# Patient Record
Sex: MALE | Race: BLACK OR AFRICAN AMERICAN | NOT HISPANIC OR LATINO | Employment: FULL TIME | URBAN - METROPOLITAN AREA
[De-identification: names, ages, dates, MRNs, and addresses within clinical notes are randomized per-mention and may not be internally consistent; named-entity substitution may affect disease eponyms.]

---

## 2018-05-14 ENCOUNTER — TELEPHONE (OUTPATIENT)
Dept: OTOLARYNGOLOGY | Facility: CLINIC | Age: 63
End: 2018-05-14

## 2018-05-14 NOTE — TELEPHONE ENCOUNTER
Spoke with patient concerning nodule , lump in throat DR. FOURNIER does not treat throat area , refer patient to HEAD AND NECK  DR. LEMUS MAY 17, 2018 Thursday

## 2018-05-17 ENCOUNTER — LAB VISIT (OUTPATIENT)
Dept: LAB | Facility: HOSPITAL | Age: 63
End: 2018-05-17
Attending: OTOLARYNGOLOGY
Payer: COMMERCIAL

## 2018-05-17 ENCOUNTER — OFFICE VISIT (OUTPATIENT)
Dept: OTOLARYNGOLOGY | Facility: CLINIC | Age: 63
End: 2018-05-17
Payer: COMMERCIAL

## 2018-05-17 VITALS
HEART RATE: 86 BPM | BODY MASS INDEX: 31.48 KG/M2 | WEIGHT: 245.13 LBS | TEMPERATURE: 99 F | DIASTOLIC BLOOD PRESSURE: 84 MMHG | SYSTOLIC BLOOD PRESSURE: 133 MMHG

## 2018-05-17 DIAGNOSIS — K11.8 PAROTID MASS: ICD-10-CM

## 2018-05-17 DIAGNOSIS — K11.8 PAROTID MASS: Primary | ICD-10-CM

## 2018-05-17 LAB
CREAT SERPL-MCNC: 0.8 MG/DL
EST. GFR  (AFRICAN AMERICAN): >60 ML/MIN/1.73 M^2
EST. GFR  (NON AFRICAN AMERICAN): >60 ML/MIN/1.73 M^2

## 2018-05-17 PROCEDURE — 3008F BODY MASS INDEX DOCD: CPT | Mod: CPTII,S$GLB,, | Performed by: OTOLARYNGOLOGY

## 2018-05-17 PROCEDURE — 82565 ASSAY OF CREATININE: CPT

## 2018-05-17 PROCEDURE — 88173 CYTOPATH EVAL FNA REPORT: CPT | Performed by: PATHOLOGY

## 2018-05-17 PROCEDURE — 99999 PR PBB SHADOW E&M-EST. PATIENT-LVL III: CPT | Mod: PBBFAC,,, | Performed by: OTOLARYNGOLOGY

## 2018-05-17 PROCEDURE — 88173 CYTOPATH EVAL FNA REPORT: CPT | Mod: 26,,, | Performed by: PATHOLOGY

## 2018-05-17 PROCEDURE — 36415 COLL VENOUS BLD VENIPUNCTURE: CPT

## 2018-05-17 PROCEDURE — 99213 OFFICE O/P EST LOW 20 MIN: CPT | Mod: 25,S$GLB,, | Performed by: OTOLARYNGOLOGY

## 2018-05-17 PROCEDURE — 31575 DIAGNOSTIC LARYNGOSCOPY: CPT | Mod: S$GLB,,, | Performed by: OTOLARYNGOLOGY

## 2018-05-17 PROCEDURE — 10021 FNA BX W/O IMG GDN 1ST LES: CPT | Mod: ,,, | Performed by: PATHOLOGY

## 2018-05-17 NOTE — PROCEDURES
The patient was examined and there was a palpable mass, lt parotid    The patient was explained the nature of the procedure and risks, and a consent form was signed. At timeout was performed at 3:20 pm    The skin was prepared with alcohol, and fine needle aspirate was performed. 2 passes were performed. Material was obtained, and results will follow in a separate report. The patient tolerated the procedure well.  Additional comments: none   Complications: none

## 2018-05-18 ENCOUNTER — TELEPHONE (OUTPATIENT)
Dept: OTOLARYNGOLOGY | Facility: CLINIC | Age: 63
End: 2018-05-18

## 2018-05-18 ENCOUNTER — HOSPITAL ENCOUNTER (OUTPATIENT)
Dept: RADIOLOGY | Facility: HOSPITAL | Age: 63
Discharge: HOME | End: 2018-05-18
Attending: OTOLARYNGOLOGY
Payer: COMMERCIAL

## 2018-05-18 DIAGNOSIS — K11.8 PAROTID MASS: ICD-10-CM

## 2018-05-18 PROCEDURE — 70491 CT SOFT TISSUE NECK W/DYE: CPT | Mod: TC

## 2018-05-18 PROCEDURE — 25500020 PHARM REV CODE 255: Performed by: OTOLARYNGOLOGY

## 2018-05-18 PROCEDURE — 70491 CT SOFT TISSUE NECK W/DYE: CPT | Mod: 26,,, | Performed by: RADIOLOGY

## 2018-05-18 RX ADMIN — IOHEXOL 75 ML: 350 INJECTION, SOLUTION INTRAVENOUS at 09:05

## 2018-05-22 ENCOUNTER — OFFICE VISIT (OUTPATIENT)
Dept: OTOLARYNGOLOGY | Facility: CLINIC | Age: 63
End: 2018-05-22
Payer: COMMERCIAL

## 2018-05-22 VITALS
DIASTOLIC BLOOD PRESSURE: 93 MMHG | WEIGHT: 247.81 LBS | BODY MASS INDEX: 31.82 KG/M2 | HEART RATE: 96 BPM | TEMPERATURE: 98 F | SYSTOLIC BLOOD PRESSURE: 133 MMHG

## 2018-05-22 DIAGNOSIS — K11.8 PAROTID MASS: Primary | ICD-10-CM

## 2018-05-22 PROCEDURE — 99999 PR PBB SHADOW E&M-EST. PATIENT-LVL III: CPT | Mod: PBBFAC,,, | Performed by: OTOLARYNGOLOGY

## 2018-05-22 PROCEDURE — 3008F BODY MASS INDEX DOCD: CPT | Mod: CPTII,S$GLB,, | Performed by: OTOLARYNGOLOGY

## 2018-05-22 PROCEDURE — 10021 FNA BX W/O IMG GDN 1ST LES: CPT | Mod: ,,, | Performed by: PATHOLOGY

## 2018-05-22 PROCEDURE — 88173 CYTOPATH EVAL FNA REPORT: CPT | Performed by: PATHOLOGY

## 2018-05-22 PROCEDURE — 99213 OFFICE O/P EST LOW 20 MIN: CPT | Mod: S$GLB,,, | Performed by: OTOLARYNGOLOGY

## 2018-05-22 PROCEDURE — 88173 CYTOPATH EVAL FNA REPORT: CPT | Mod: 26,,, | Performed by: PATHOLOGY

## 2018-05-22 NOTE — PROCEDURES
The patient was examined and there was a palpable mass,lt parotid      The patient was explained the nature of the procedure and risks, and a consent form was signed. At timeout was performed at 4 pm    The skin was prepared with alcohol, and fine needle aspirate was performed. 3 passes were performed. Material was obtained, and results will follow in a separate report. The patient tolerated the procedure well.  Additional comments: none  Complications: none

## 2018-05-22 NOTE — PROGRESS NOTES
Chief Complaint   Patient presents with    lump in throat       HPI   62 y.o. male presents for evaluation of a L parotid mass.  He reports that this mass arose several mos ago and has grown progressively larger over this period of time.  No pain.  No sore throat, dysphagia, or SOB.  He denies facial paresthesia.     Review of Systems   Constitutional: Negative for fatigue and unexpected weight change.   HENT: Per HPI.  Eyes: Negative for visual disturbance.   Respiratory: Negative for shortness of breath, hemoptysis   Cardiovascular: Negative for chest pain and palpitations.   Musculoskeletal: Negative for decreased ROM, back pain.   Skin: Negative for rash, sunburn, itching.   Neurological: Negative for dizziness and seizures.   Hematological: Negative for adenopathy. Does not bruise/bleed easily.   Endocrine: Negative for rapid weight loss/weight gain, heat/cold intolerance.     Past Medical History   Patient Active Problem List   Diagnosis    Sensation of fullness in both ears    Impacted cerumen of both ears           Past Surgical History   History reviewed. No pertinent surgical history.      Family History   History reviewed. No pertinent family history.        Social History   .  Social History     Social History    Marital status:      Spouse name: N/A    Number of children: N/A    Years of education: N/A     Occupational History    Not on file.     Social History Main Topics    Smoking status: Never Smoker    Smokeless tobacco: Never Used    Alcohol use No    Drug use: Unknown    Sexual activity: Not on file     Other Topics Concern    Not on file     Social History Narrative    No narrative on file         Allergies   Review of patient's allergies indicates:  No Known Allergies        Physical Exam     Vitals:    05/17/18 1442   BP: 133/84   Pulse: 86   Temp: 98.7 °F (37.1 °C)         Body mass index is 31.48 kg/m².      General: AOx3, NAD   Respiratory:  Symmetric chest rise, normal  effort  Nose: No gross nasal septal deviation. Inferior Turbinates WNL bilaterally. No septal perforation. No masses/lesions.   Oral Cavity:  Oral Tongue mobile, no lesions noted. Hard Palate WNL. No buccal or FOM lesions.  Oropharynx:  No masses/lesions of the posterior pharyngeal wall. Tonsillar fossa without lesions. Soft palate without masses. Midline uvula.   Neck: No scars.  No cervical lymphadenopathy, thyromegaly or thyroid nodules.  Normal range of motion.    Face: House Brackmann I bilaterally. L parotid with firm mass based at the inferior aspect of the parotid gland.  ~6 cm in diameter.  The mass extends to the submandibular region.     Flex Naso Ericka Hypo Procedures #2    Procedure:  Diagnostic flexible nasopharyngoscopy, laryngoscopy and hypopharyngoscopy:    Routine preparation with local atomizer with 1% neosynephrine/pontocaine with customary flexible endoscope.    Nasopharynx:  No lesions.   Mucosa:  No lesions.   Adenoids:  Present.  Posterior Choanae:  Patent.  Eustachian Tubes:  Patent.  Posterior pharynx:  No lesions.  Larynx/hypopharynx:   Epiglottis:  No lesions, without edema.   AE Folds:  No lesions.   Vocal cords:  No polyps, nodules, ulcers or lesions.   Mobility:  Equal and normal bilateral.   Hypopharynx:  No lesions.   Piriform sinus:  No pooling, no lesions.   Post Cricoid:  No erythema, no edema.      Assessment/Plan  Problem List Items Addressed This Visit        ENT    Parotid mass - Primary     L parotid mass worrisome for malignancy.  No other lesions noted on head and neck exam.  FNA performed today.   I ordered a CT scan of the neck and will contact him with the results of these studies.          Relevant Orders    Cytology Specimen-FNA-Pathologist Performed (Completed)    CT Soft Tissue Neck With Contrast (Completed)    Creatinine, serum (Completed)

## 2018-05-22 NOTE — ASSESSMENT & PLAN NOTE
L parotid mass worrisome for malignancy.  No other lesions noted on head and neck exam.  FNA performed today.   I ordered a CT scan of the neck and will contact him with the results of these studies.

## 2018-05-23 DIAGNOSIS — R22.1 NECK MASS: Primary | ICD-10-CM

## 2018-05-23 RX ORDER — LIDOCAINE HYDROCHLORIDE 10 MG/ML
1 INJECTION, SOLUTION EPIDURAL; INFILTRATION; INTRACAUDAL; PERINEURAL ONCE
Status: CANCELLED | OUTPATIENT
Start: 2018-05-23 | End: 2018-05-23

## 2018-05-28 NOTE — ASSESSMENT & PLAN NOTE
Clinically and radiographically worrisome for malignancy.  Repeat FNA today.  Will contact him with FNA results.

## 2018-05-28 NOTE — PROGRESS NOTES
Chief Complaint   Patient presents with    Follow-up     Discuss FNA       HPI   62 y.o. male presents for evaluation of a L parotid mass.  He reports that this mass arose several mos ago and has grown progressively larger over this period of time.  No pain.  No sore throat, dysphagia, or SOB.  He denies facial paresthesia.     CT neck worrisome for malignancy. Recent FNA essentially nondiagnostic.  He presents for repeat FNA.     Review of Systems   Constitutional: Negative for fatigue and unexpected weight change.   HENT: Per HPI.  Eyes: Negative for visual disturbance.   Respiratory: Negative for shortness of breath, hemoptysis   Cardiovascular: Negative for chest pain and palpitations.   Musculoskeletal: Negative for decreased ROM, back pain.   Skin: Negative for rash, sunburn, itching.   Neurological: Negative for dizziness and seizures.   Hematological: Negative for adenopathy. Does not bruise/bleed easily.   Endocrine: Negative for rapid weight loss/weight gain, heat/cold intolerance.     Past Medical History   Patient Active Problem List   Diagnosis    Sensation of fullness in both ears    Impacted cerumen of both ears    Parotid mass           Past Surgical History   No past surgical history on file.      Family History   No family history on file.        Social History   .  Social History     Social History    Marital status:      Spouse name: N/A    Number of children: N/A    Years of education: N/A     Occupational History    Not on file.     Social History Main Topics    Smoking status: Never Smoker    Smokeless tobacco: Never Used    Alcohol use No    Drug use: Unknown    Sexual activity: Not on file     Other Topics Concern    Not on file     Social History Narrative    No narrative on file         Allergies   Review of patient's allergies indicates:  No Known Allergies        Physical Exam     Vitals:    05/22/18 1522   BP: (!) 133/93   Pulse: 96   Temp: 98 °F (36.7 °C)          Body mass index is 31.82 kg/m².      General: AOx3, NAD   Respiratory:  Symmetric chest rise, normal effort   Neck: No scars.  No cervical lymphadenopathy, thyromegaly or thyroid nodules.  Normal range of motion.    Face: House Brackmann I bilaterally. L parotid with firm mass based at the inferior aspect of the parotid gland.  ~6 cm in diameter.  The mass extends to the submandibular region.     CT neck reviewed      Assessment/Plan  Problem List Items Addressed This Visit        ENT    Parotid mass - Primary     Clinically and radiographically worrisome for malignancy.  Repeat FNA today.  Will contact him with FNA results.          Relevant Orders    Cytology Specimen-FNA-Pathologist Performed (Completed)

## 2018-05-29 ENCOUNTER — OFFICE VISIT (OUTPATIENT)
Dept: OTOLARYNGOLOGY | Facility: CLINIC | Age: 63
DRG: 129 | End: 2018-05-29
Payer: COMMERCIAL

## 2018-05-29 ENCOUNTER — HOSPITAL ENCOUNTER (OUTPATIENT)
Dept: RADIOLOGY | Facility: HOSPITAL | Age: 63
Discharge: HOME | End: 2018-05-29
Attending: OTOLARYNGOLOGY
Payer: COMMERCIAL

## 2018-05-29 VITALS — HEART RATE: 79 BPM | SYSTOLIC BLOOD PRESSURE: 137 MMHG | DIASTOLIC BLOOD PRESSURE: 88 MMHG | TEMPERATURE: 99 F

## 2018-05-29 DIAGNOSIS — R22.1 NECK MASS: Primary | ICD-10-CM

## 2018-05-29 DIAGNOSIS — R22.1 NECK MASS: ICD-10-CM

## 2018-05-29 PROCEDURE — 99999 PR PBB SHADOW E&M-EST. PATIENT-LVL II: CPT | Mod: PBBFAC,,, | Performed by: OTOLARYNGOLOGY

## 2018-05-29 PROCEDURE — 71260 CT THORAX DX C+: CPT | Mod: TC

## 2018-05-29 PROCEDURE — 25500020 PHARM REV CODE 255: Performed by: OTOLARYNGOLOGY

## 2018-05-29 PROCEDURE — 71260 CT THORAX DX C+: CPT | Mod: 26,,, | Performed by: RADIOLOGY

## 2018-05-29 PROCEDURE — 99214 OFFICE O/P EST MOD 30 MIN: CPT | Mod: S$GLB,,, | Performed by: OTOLARYNGOLOGY

## 2018-05-29 RX ORDER — METFORMIN HYDROCHLORIDE 500 MG/1
1000 TABLET ORAL 2 TIMES DAILY WITH MEALS
COMMUNITY
End: 2018-08-07 | Stop reason: SDUPTHER

## 2018-05-29 RX ADMIN — IOHEXOL 75 ML: 350 INJECTION, SOLUTION INTRAVENOUS at 09:05

## 2018-05-29 NOTE — PROGRESS NOTES
Chief Complaint   Patient presents with    Discuss CT results       HPI   62 y.o. male presents for evaluation of a L parotid mass.  He reports that this mass arose several mos ago and has grown progressively larger over this period of time.  No pain.  No sore throat, dysphagia, or SOB.  He denies facial paresthesia.     CT neck worrisome for malignancy. Recent FNA essentially nondiagnostic x2.  He does feel that his neck mass has increased in size since his last visit.  His lip weakness has also worsened. CT chest today clear.  He is scheduled for surgery tomorrow.     Review of Systems   Constitutional: Negative for fatigue and unexpected weight change.   HENT: Per HPI.  Eyes: Negative for visual disturbance.   Respiratory: Negative for shortness of breath, hemoptysis   Cardiovascular: Negative for chest pain and palpitations.   Musculoskeletal: Negative for decreased ROM, back pain.   Skin: Negative for rash, sunburn, itching.   Neurological: Negative for dizziness and seizures.   Hematological: Negative for adenopathy. Does not bruise/bleed easily.   Endocrine: Negative for rapid weight loss/weight gain, heat/cold intolerance.     Past Medical History   Patient Active Problem List   Diagnosis    Sensation of fullness in both ears    Impacted cerumen of both ears    Parotid mass           Past Surgical History   No past surgical history on file.      Family History   No family history on file.        Social History   .  Social History     Social History    Marital status:      Spouse name: N/A    Number of children: N/A    Years of education: N/A     Occupational History    Not on file.     Social History Main Topics    Smoking status: Never Smoker    Smokeless tobacco: Never Used    Alcohol use No    Drug use: Unknown    Sexual activity: Not on file     Other Topics Concern    Not on file     Social History Narrative    No narrative on file         Allergies   Review of patient's allergies  indicates:  No Known Allergies        Physical Exam     Vitals:    05/29/18 1311   BP: 137/88   Pulse: 79   Temp: 98.7 °F (37.1 °C)         There is no height or weight on file to calculate BMI.      General: AOx3, NAD   Respiratory:  Symmetric chest rise, normal effort   Neck: No scars.  No cervical lymphadenopathy, thyromegaly or thyroid nodules.  Normal range of motion.    Face: House Brackmann I bilaterally. L parotid with firm mass based at the inferior aspect of the parotid gland.  ~6 cm in diameter.  The mass extends to the submandibular region.     CT neck, chest reviewed      Assessment/Plan  Problem List Items Addressed This Visit        ENT    Neck mass - Primary     L neck mass worrisome for malignancy.  FNA essentially nondiagnostic x2.  I recommended that we proceed to the OR for biopsy of this lesion and, should it prove consistent with carcinoma on frozen section, surgical resection.  I explained that surgery would involve L parotidectomy with sacrifice of the marginal mandibular nerve and, possibly, the facial nerve depending on the extent of nerve invasion, L neck dissection and, possibly, mandibulectomy.    We then discussed the risks, benefits, indications, and alternatives to left parotidectomy.  The risks were noted to include, but not be limited to, infection, bleeding, scarring, partial or total weakness of one or all of the branches of the facial nerve, eye dryness and potential injury with nerve weakness, numbness of the ear and the side of the face, gustatory sweating (Christine's syndrome - which was described as sweating while eating due to cholinergic stimulation of sweat glands in the absence of salivary tissue), first bite syndrome (pain upon initial bite of food after dissection in the parapharyngeal space, tumor spillage, recurrence, collection of blood or tissue fluid, sialolcele formation requiring drainage, and the need for additional procedures.  The benefits in this case will be  diagnostic and potentially therapeutic, and the indication is a parotid mass.  The chief alternative, in the absence of a diagnosis, is observation or repeat needle biopsy.  Time was allowed for questions, and all questions were answered to the patient's apparent satisfaction.      We discussed the risks, benefits, and indications to left neck dissection. The risks were noted to include, but not be limited to, infection, bleeding, scarring, collection of blood or tissue fluid requiring drainage, weakness of the lip which may be temporary or permanent, weakness of the tongue which could be temporary or permanent and cause speech and/or swallowing difficulty, weakness of the shoulder which could be temporary or permanent, pain, chyle leakage which would require dietary modification and perhaps additional procedures, and the need for additional procedures. Time was allowed for questions, and all questions were answered to the patient's apparent satisfaction.     We will proceed with surgery tomorrow, 5/29/18.

## 2018-05-29 NOTE — ASSESSMENT & PLAN NOTE
L neck mass worrisome for malignancy.  FNA essentially nondiagnostic x2.  I recommended that we proceed to the OR for biopsy of this lesion and, should it prove consistent with carcinoma on frozen section, surgical resection.  I explained that surgery would involve L parotidectomy with sacrifice of the marginal mandibular nerve and, possibly, the facial nerve depending on the extent of nerve invasion, L neck dissection and, possibly, mandibulectomy.    We then discussed the risks, benefits, indications, and alternatives to left parotidectomy.  The risks were noted to include, but not be limited to, infection, bleeding, scarring, partial or total weakness of one or all of the branches of the facial nerve, eye dryness and potential injury with nerve weakness, numbness of the ear and the side of the face, gustatory sweating (Christine's syndrome - which was described as sweating while eating due to cholinergic stimulation of sweat glands in the absence of salivary tissue), first bite syndrome (pain upon initial bite of food after dissection in the parapharyngeal space, tumor spillage, recurrence, collection of blood or tissue fluid, sialolcele formation requiring drainage, and the need for additional procedures.  The benefits in this case will be diagnostic and potentially therapeutic, and the indication is a parotid mass.  The chief alternative, in the absence of a diagnosis, is observation or repeat needle biopsy.  Time was allowed for questions, and all questions were answered to the patient's apparent satisfaction.      We discussed the risks, benefits, and indications to left neck dissection. The risks were noted to include, but not be limited to, infection, bleeding, scarring, collection of blood or tissue fluid requiring drainage, weakness of the lip which may be temporary or permanent, weakness of the tongue which could be temporary or permanent and cause speech and/or swallowing difficulty, weakness of the shoulder  which could be temporary or permanent, pain, chyle leakage which would require dietary modification and perhaps additional procedures, and the need for additional procedures. Time was allowed for questions, and all questions were answered to the patient's apparent satisfaction.     We will proceed with surgery tomorrow, 5/29/18.

## 2018-05-30 ENCOUNTER — ANESTHESIA EVENT (OUTPATIENT)
Dept: SURGERY | Facility: HOSPITAL | Age: 63
DRG: 129 | End: 2018-05-30
Payer: COMMERCIAL

## 2018-05-30 ENCOUNTER — HOSPITAL ENCOUNTER (INPATIENT)
Facility: HOSPITAL | Age: 63
LOS: 2 days | Discharge: HOME | DRG: 129 | End: 2018-06-01
Attending: OTOLARYNGOLOGY | Admitting: OTOLARYNGOLOGY
Payer: COMMERCIAL

## 2018-05-30 ENCOUNTER — ANESTHESIA (OUTPATIENT)
Dept: SURGERY | Facility: HOSPITAL | Age: 63
DRG: 129 | End: 2018-05-30
Payer: COMMERCIAL

## 2018-05-30 DIAGNOSIS — R22.1 NECK MASS: Primary | ICD-10-CM

## 2018-05-30 LAB — POCT GLUCOSE: 194 MG/DL (ref 70–110)

## 2018-05-30 PROCEDURE — 88189 FLOWCYTOMETRY/READ 16 & >: CPT | Mod: ,,, | Performed by: PATHOLOGY

## 2018-05-30 PROCEDURE — 38724 REMOVAL OF LYMPH NODES NECK: CPT | Mod: LT,,, | Performed by: OTOLARYNGOLOGY

## 2018-05-30 PROCEDURE — P9045 ALBUMIN (HUMAN), 5%, 250 ML: HCPCS | Mod: JG | Performed by: STUDENT IN AN ORGANIZED HEALTH CARE EDUCATION/TRAINING PROGRAM

## 2018-05-30 PROCEDURE — 25000003 PHARM REV CODE 250: Performed by: STUDENT IN AN ORGANIZED HEALTH CARE EDUCATION/TRAINING PROGRAM

## 2018-05-30 PROCEDURE — 25000242 PHARM REV CODE 250 ALT 637 W/ HCPCS: Performed by: STUDENT IN AN ORGANIZED HEALTH CARE EDUCATION/TRAINING PROGRAM

## 2018-05-30 PROCEDURE — 27201423 OPTIME MED/SURG SUP & DEVICES STERILE SUPPLY: Performed by: OTOLARYNGOLOGY

## 2018-05-30 PROCEDURE — 12000002 HC ACUTE/MED SURGE SEMI-PRIVATE ROOM

## 2018-05-30 PROCEDURE — 88341 IMHCHEM/IMCYTCHM EA ADD ANTB: CPT | Mod: 26,59,, | Performed by: PATHOLOGY

## 2018-05-30 PROCEDURE — 07T20ZZ RESECTION OF LEFT NECK LYMPHATIC, OPEN APPROACH: ICD-10-PCS | Performed by: OTOLARYNGOLOGY

## 2018-05-30 PROCEDURE — 37000009 HC ANESTHESIA EA ADD 15 MINS: Performed by: OTOLARYNGOLOGY

## 2018-05-30 PROCEDURE — 88185 FLOWCYTOMETRY/TC ADD-ON: CPT | Mod: 59 | Performed by: PATHOLOGY

## 2018-05-30 PROCEDURE — 82962 GLUCOSE BLOOD TEST: CPT | Performed by: OTOLARYNGOLOGY

## 2018-05-30 PROCEDURE — D9220A PRA ANESTHESIA: Mod: ANES,,, | Performed by: ANESTHESIOLOGY

## 2018-05-30 PROCEDURE — 88307 TISSUE EXAM BY PATHOLOGIST: CPT | Mod: 26,,, | Performed by: PATHOLOGY

## 2018-05-30 PROCEDURE — 36000708 HC OR TIME LEV III 1ST 15 MIN: Performed by: OTOLARYNGOLOGY

## 2018-05-30 PROCEDURE — 88365 INSITU HYBRIDIZATION (FISH): CPT | Mod: 26,,, | Performed by: PATHOLOGY

## 2018-05-30 PROCEDURE — 37000008 HC ANESTHESIA 1ST 15 MINUTES: Performed by: OTOLARYNGOLOGY

## 2018-05-30 PROCEDURE — 0CT90ZZ RESECTION OF LEFT PAROTID GLAND, OPEN APPROACH: ICD-10-PCS | Performed by: OTOLARYNGOLOGY

## 2018-05-30 PROCEDURE — 07B20ZX EXCISION OF LEFT NECK LYMPHATIC, OPEN APPROACH, DIAGNOSTIC: ICD-10-PCS | Performed by: OTOLARYNGOLOGY

## 2018-05-30 PROCEDURE — 63600175 PHARM REV CODE 636 W HCPCS: Performed by: STUDENT IN AN ORGANIZED HEALTH CARE EDUCATION/TRAINING PROGRAM

## 2018-05-30 PROCEDURE — 25000003 PHARM REV CODE 250: Performed by: OTOLARYNGOLOGY

## 2018-05-30 PROCEDURE — 00BM0ZZ EXCISION OF FACIAL NERVE, OPEN APPROACH: ICD-10-PCS | Performed by: OTOLARYNGOLOGY

## 2018-05-30 PROCEDURE — 25000003 PHARM REV CODE 250: Performed by: NURSE ANESTHETIST, CERTIFIED REGISTERED

## 2018-05-30 PROCEDURE — 42425 EXCISE PAROTID GLAND/LESION: CPT | Mod: 51,LT,, | Performed by: OTOLARYNGOLOGY

## 2018-05-30 PROCEDURE — 88342 IMHCHEM/IMCYTCHM 1ST ANTB: CPT | Mod: 26,59,, | Performed by: PATHOLOGY

## 2018-05-30 PROCEDURE — 71000033 HC RECOVERY, INTIAL HOUR: Performed by: OTOLARYNGOLOGY

## 2018-05-30 PROCEDURE — 88305 TISSUE EXAM BY PATHOLOGIST: CPT | Performed by: PATHOLOGY

## 2018-05-30 PROCEDURE — 88307 TISSUE EXAM BY PATHOLOGIST: CPT | Performed by: PATHOLOGY

## 2018-05-30 PROCEDURE — 63600175 PHARM REV CODE 636 W HCPCS: Performed by: OTOLARYNGOLOGY

## 2018-05-30 PROCEDURE — D9220A PRA ANESTHESIA: Mod: CRNA,,, | Performed by: NURSE ANESTHETIST, CERTIFIED REGISTERED

## 2018-05-30 PROCEDURE — 63600175 PHARM REV CODE 636 W HCPCS: Performed by: NURSE ANESTHETIST, CERTIFIED REGISTERED

## 2018-05-30 PROCEDURE — 88184 FLOWCYTOMETRY/ TC 1 MARKER: CPT | Performed by: PATHOLOGY

## 2018-05-30 PROCEDURE — 88305 TISSUE EXAM BY PATHOLOGIST: CPT | Mod: 26,,, | Performed by: PATHOLOGY

## 2018-05-30 PROCEDURE — 71000039 HC RECOVERY, EACH ADD'L HOUR: Performed by: OTOLARYNGOLOGY

## 2018-05-30 PROCEDURE — C1729 CATH, DRAINAGE: HCPCS | Performed by: OTOLARYNGOLOGY

## 2018-05-30 PROCEDURE — 36000709 HC OR TIME LEV III EA ADD 15 MIN: Performed by: OTOLARYNGOLOGY

## 2018-05-30 PROCEDURE — 88331 PATH CONSLTJ SURG 1 BLK 1SPC: CPT | Mod: 26,,, | Performed by: PATHOLOGY

## 2018-05-30 RX ORDER — LIDOCAINE HYDROCHLORIDE AND EPINEPHRINE 10; 10 MG/ML; UG/ML
INJECTION, SOLUTION INFILTRATION; PERINEURAL
Status: DISCONTINUED | OUTPATIENT
Start: 2018-05-30 | End: 2018-05-30 | Stop reason: HOSPADM

## 2018-05-30 RX ORDER — GLUCAGON 1 MG
1 KIT INJECTION
Status: DISCONTINUED | OUTPATIENT
Start: 2018-05-30 | End: 2018-06-01 | Stop reason: HOSPADM

## 2018-05-30 RX ORDER — DEXAMETHASONE SODIUM PHOSPHATE 4 MG/ML
INJECTION, SOLUTION INTRA-ARTICULAR; INTRALESIONAL; INTRAMUSCULAR; INTRAVENOUS; SOFT TISSUE
Status: DISCONTINUED | OUTPATIENT
Start: 2018-05-30 | End: 2018-05-30

## 2018-05-30 RX ORDER — CEPHALEXIN 500 MG/1
500 CAPSULE ORAL EVERY 8 HOURS
Status: DISCONTINUED | OUTPATIENT
Start: 2018-05-30 | End: 2018-06-01 | Stop reason: HOSPADM

## 2018-05-30 RX ORDER — IBUPROFEN 200 MG
24 TABLET ORAL
Status: DISCONTINUED | OUTPATIENT
Start: 2018-05-30 | End: 2018-06-01 | Stop reason: HOSPADM

## 2018-05-30 RX ORDER — PHENYLEPHRINE HYDROCHLORIDE 10 MG/ML
INJECTION INTRAVENOUS
Status: DISCONTINUED | OUTPATIENT
Start: 2018-05-30 | End: 2018-05-30

## 2018-05-30 RX ORDER — MIDAZOLAM HYDROCHLORIDE 1 MG/ML
INJECTION, SOLUTION INTRAMUSCULAR; INTRAVENOUS
Status: DISCONTINUED | OUTPATIENT
Start: 2018-05-30 | End: 2018-05-30

## 2018-05-30 RX ORDER — SUCCINYLCHOLINE CHLORIDE 20 MG/ML
INJECTION INTRAMUSCULAR; INTRAVENOUS
Status: DISCONTINUED | OUTPATIENT
Start: 2018-05-30 | End: 2018-05-30

## 2018-05-30 RX ORDER — DEXMEDETOMIDINE HYDROCHLORIDE 100 UG/ML
INJECTION, SOLUTION INTRAVENOUS
Status: DISCONTINUED | OUTPATIENT
Start: 2018-05-30 | End: 2018-05-30

## 2018-05-30 RX ORDER — KETAMINE HCL IN 0.9 % NACL 50 MG/5 ML
SYRINGE (ML) INTRAVENOUS
Status: DISCONTINUED | OUTPATIENT
Start: 2018-05-30 | End: 2018-05-30

## 2018-05-30 RX ORDER — HYDROMORPHONE HYDROCHLORIDE 1 MG/ML
1 INJECTION, SOLUTION INTRAMUSCULAR; INTRAVENOUS; SUBCUTANEOUS EVERY 4 HOURS PRN
Status: DISCONTINUED | OUTPATIENT
Start: 2018-05-30 | End: 2018-06-01 | Stop reason: HOSPADM

## 2018-05-30 RX ORDER — LIDOCAINE HYDROCHLORIDE 10 MG/ML
1 INJECTION, SOLUTION EPIDURAL; INFILTRATION; INTRACAUDAL; PERINEURAL ONCE
Status: DISCONTINUED | OUTPATIENT
Start: 2018-05-30 | End: 2018-05-30

## 2018-05-30 RX ORDER — CEFAZOLIN SODIUM 1 G/3ML
2 INJECTION, POWDER, FOR SOLUTION INTRAMUSCULAR; INTRAVENOUS
Status: COMPLETED | OUTPATIENT
Start: 2018-05-30 | End: 2018-05-30

## 2018-05-30 RX ORDER — OXYCODONE AND ACETAMINOPHEN 5; 325 MG/1; MG/1
1 TABLET ORAL EVERY 6 HOURS PRN
Status: DISCONTINUED | OUTPATIENT
Start: 2018-05-30 | End: 2018-06-01 | Stop reason: HOSPADM

## 2018-05-30 RX ORDER — BACITRACIN 500 [USP'U]/G
OINTMENT TOPICAL EVERY 12 HOURS
Status: DISCONTINUED | OUTPATIENT
Start: 2018-05-30 | End: 2018-06-01 | Stop reason: HOSPADM

## 2018-05-30 RX ORDER — SODIUM CHLORIDE 0.9 % (FLUSH) 0.9 %
3 SYRINGE (ML) INJECTION
Status: DISCONTINUED | OUTPATIENT
Start: 2018-05-30 | End: 2018-06-01 | Stop reason: HOSPADM

## 2018-05-30 RX ORDER — LIDOCAINE HCL/PF 100 MG/5ML
SYRINGE (ML) INTRAVENOUS
Status: DISCONTINUED | OUTPATIENT
Start: 2018-05-30 | End: 2018-05-30

## 2018-05-30 RX ORDER — NEOSTIGMINE METHYLSULFATE 1 MG/ML
INJECTION, SOLUTION INTRAVENOUS
Status: DISCONTINUED | OUTPATIENT
Start: 2018-05-30 | End: 2018-05-30

## 2018-05-30 RX ORDER — EPHEDRINE SULFATE 50 MG/ML
INJECTION, SOLUTION INTRAVENOUS
Status: DISCONTINUED | OUTPATIENT
Start: 2018-05-30 | End: 2018-05-30

## 2018-05-30 RX ORDER — ALBUMIN HUMAN 50 G/1000ML
SOLUTION INTRAVENOUS CONTINUOUS PRN
Status: DISCONTINUED | OUTPATIENT
Start: 2018-05-30 | End: 2018-05-30

## 2018-05-30 RX ORDER — ZOLPIDEM TARTRATE 5 MG/1
5 TABLET ORAL NIGHTLY PRN
Status: DISCONTINUED | OUTPATIENT
Start: 2018-05-30 | End: 2018-06-01 | Stop reason: HOSPADM

## 2018-05-30 RX ORDER — ROCURONIUM BROMIDE 10 MG/ML
INJECTION, SOLUTION INTRAVENOUS
Status: DISCONTINUED | OUTPATIENT
Start: 2018-05-30 | End: 2018-05-30

## 2018-05-30 RX ORDER — ACETAMINOPHEN 10 MG/ML
INJECTION, SOLUTION INTRAVENOUS
Status: DISCONTINUED | OUTPATIENT
Start: 2018-05-30 | End: 2018-05-30

## 2018-05-30 RX ORDER — ONDANSETRON 4 MG/1
8 TABLET, FILM COATED ORAL EVERY 6 HOURS PRN
Status: DISCONTINUED | OUTPATIENT
Start: 2018-05-30 | End: 2018-06-01 | Stop reason: HOSPADM

## 2018-05-30 RX ORDER — PROPOFOL 10 MG/ML
VIAL (ML) INTRAVENOUS
Status: DISCONTINUED | OUTPATIENT
Start: 2018-05-30 | End: 2018-05-30

## 2018-05-30 RX ORDER — HYDROMORPHONE HYDROCHLORIDE 1 MG/ML
0.2 INJECTION, SOLUTION INTRAMUSCULAR; INTRAVENOUS; SUBCUTANEOUS EVERY 5 MIN PRN
Status: DISCONTINUED | OUTPATIENT
Start: 2018-05-30 | End: 2018-05-30 | Stop reason: HOSPADM

## 2018-05-30 RX ORDER — IBUPROFEN 200 MG
16 TABLET ORAL
Status: DISCONTINUED | OUTPATIENT
Start: 2018-05-30 | End: 2018-06-01 | Stop reason: HOSPADM

## 2018-05-30 RX ORDER — PROMETHAZINE HYDROCHLORIDE 25 MG/1
25 TABLET ORAL EVERY 6 HOURS PRN
Status: DISCONTINUED | OUTPATIENT
Start: 2018-05-30 | End: 2018-06-01 | Stop reason: HOSPADM

## 2018-05-30 RX ORDER — SODIUM CHLORIDE 9 MG/ML
INJECTION, SOLUTION INTRAVENOUS CONTINUOUS PRN
Status: DISCONTINUED | OUTPATIENT
Start: 2018-05-30 | End: 2018-05-30

## 2018-05-30 RX ORDER — ALBUTEROL SULFATE 90 UG/1
AEROSOL, METERED RESPIRATORY (INHALATION)
Status: DISCONTINUED | OUTPATIENT
Start: 2018-05-30 | End: 2018-05-30

## 2018-05-30 RX ORDER — GLYCOPYRROLATE 0.2 MG/ML
INJECTION INTRAMUSCULAR; INTRAVENOUS
Status: DISCONTINUED | OUTPATIENT
Start: 2018-05-30 | End: 2018-05-30

## 2018-05-30 RX ORDER — AMOXICILLIN 250 MG
1 CAPSULE ORAL 2 TIMES DAILY
Status: DISCONTINUED | OUTPATIENT
Start: 2018-05-30 | End: 2018-06-01 | Stop reason: HOSPADM

## 2018-05-30 RX ORDER — INSULIN ASPART 100 [IU]/ML
0-5 INJECTION, SOLUTION INTRAVENOUS; SUBCUTANEOUS
Status: DISCONTINUED | OUTPATIENT
Start: 2018-05-30 | End: 2018-06-01 | Stop reason: HOSPADM

## 2018-05-30 RX ORDER — ENOXAPARIN SODIUM 100 MG/ML
40 INJECTION SUBCUTANEOUS EVERY 24 HOURS
Status: DISCONTINUED | OUTPATIENT
Start: 2018-05-31 | End: 2018-06-01 | Stop reason: HOSPADM

## 2018-05-30 RX ORDER — FENTANYL CITRATE 50 UG/ML
INJECTION, SOLUTION INTRAMUSCULAR; INTRAVENOUS
Status: DISCONTINUED | OUTPATIENT
Start: 2018-05-30 | End: 2018-05-30

## 2018-05-30 RX ADMIN — HYDROMORPHONE HYDROCHLORIDE 0.2 MG: 1 INJECTION, SOLUTION INTRAMUSCULAR; INTRAVENOUS; SUBCUTANEOUS at 09:05

## 2018-05-30 RX ADMIN — DEXAMETHASONE SODIUM PHOSPHATE 8 MG: 4 INJECTION, SOLUTION INTRAMUSCULAR; INTRAVENOUS at 06:05

## 2018-05-30 RX ADMIN — DEXMEDETOMIDINE HYDROCHLORIDE 8 MCG: 100 INJECTION, SOLUTION, CONCENTRATE INTRAVENOUS at 04:05

## 2018-05-30 RX ADMIN — ROCURONIUM BROMIDE 10 MG: 10 INJECTION, SOLUTION INTRAVENOUS at 12:05

## 2018-05-30 RX ADMIN — ALBUTEROL SULFATE 2 PUFF: 90 AEROSOL, METERED RESPIRATORY (INHALATION) at 12:05

## 2018-05-30 RX ADMIN — PROPOFOL 40 MG: 10 INJECTION, EMULSION INTRAVENOUS at 01:05

## 2018-05-30 RX ADMIN — PROPOFOL 20 MG: 10 INJECTION, EMULSION INTRAVENOUS at 02:05

## 2018-05-30 RX ADMIN — EPHEDRINE SULFATE 25 MG: 50 INJECTION, SOLUTION INTRAMUSCULAR; INTRAVENOUS; SUBCUTANEOUS at 02:05

## 2018-05-30 RX ADMIN — SODIUM CHLORIDE, SODIUM GLUCONATE, SODIUM ACETATE, POTASSIUM CHLORIDE, MAGNESIUM CHLORIDE, SODIUM PHOSPHATE, DIBASIC, AND POTASSIUM PHOSPHATE: .53; .5; .37; .037; .03; .012; .00082 INJECTION, SOLUTION INTRAVENOUS at 12:05

## 2018-05-30 RX ADMIN — GLYCOPYRROLATE 0.2 MG: 0.2 INJECTION, SOLUTION INTRAMUSCULAR; INTRAVENOUS at 02:05

## 2018-05-30 RX ADMIN — ACETAMINOPHEN 1000 MG: 10 INJECTION, SOLUTION INTRAVENOUS at 12:05

## 2018-05-30 RX ADMIN — ALBUMIN HUMAN: 0.05 INJECTION, SOLUTION INTRAVENOUS at 02:05

## 2018-05-30 RX ADMIN — FENTANYL CITRATE 50 MCG: 50 INJECTION, SOLUTION INTRAMUSCULAR; INTRAVENOUS at 01:05

## 2018-05-30 RX ADMIN — PROPOFOL 50 MG: 10 INJECTION, EMULSION INTRAVENOUS at 07:05

## 2018-05-30 RX ADMIN — PHENYLEPHRINE HYDROCHLORIDE 100 MCG: 10 INJECTION INTRAVENOUS at 03:05

## 2018-05-30 RX ADMIN — PHENYLEPHRINE HYDROCHLORIDE 100 MCG: 10 INJECTION INTRAVENOUS at 12:05

## 2018-05-30 RX ADMIN — PHENYLEPHRINE HYDROCHLORIDE 100 MCG: 10 INJECTION INTRAVENOUS at 02:05

## 2018-05-30 RX ADMIN — DEXMEDETOMIDINE HYDROCHLORIDE 4 MCG: 100 INJECTION, SOLUTION, CONCENTRATE INTRAVENOUS at 06:05

## 2018-05-30 RX ADMIN — MIDAZOLAM HYDROCHLORIDE 2 MG: 1 INJECTION, SOLUTION INTRAMUSCULAR; INTRAVENOUS at 11:05

## 2018-05-30 RX ADMIN — SUCCINYLCHOLINE CHLORIDE 160 MG: 20 INJECTION, SOLUTION INTRAMUSCULAR; INTRAVENOUS at 12:05

## 2018-05-30 RX ADMIN — DEXMEDETOMIDINE HYDROCHLORIDE 50 MCG: 100 INJECTION, SOLUTION, CONCENTRATE INTRAVENOUS at 01:05

## 2018-05-30 RX ADMIN — FENTANYL CITRATE 50 MCG: 50 INJECTION, SOLUTION INTRAMUSCULAR; INTRAVENOUS at 12:05

## 2018-05-30 RX ADMIN — PHENYLEPHRINE HYDROCHLORIDE 100 MCG: 10 INJECTION INTRAVENOUS at 01:05

## 2018-05-30 RX ADMIN — PHENYLEPHRINE HYDROCHLORIDE 100 MCG: 10 INJECTION INTRAVENOUS at 04:05

## 2018-05-30 RX ADMIN — SODIUM CHLORIDE: 0.9 INJECTION, SOLUTION INTRAVENOUS at 11:05

## 2018-05-30 RX ADMIN — Medication 10 MG: at 01:05

## 2018-05-30 RX ADMIN — FENTANYL CITRATE 50 MCG: 50 INJECTION, SOLUTION INTRAMUSCULAR; INTRAVENOUS at 02:05

## 2018-05-30 RX ADMIN — CEFAZOLIN 2 G: 330 INJECTION, POWDER, FOR SOLUTION INTRAMUSCULAR; INTRAVENOUS at 03:05

## 2018-05-30 RX ADMIN — SODIUM CHLORIDE, SODIUM GLUCONATE, SODIUM ACETATE, POTASSIUM CHLORIDE, MAGNESIUM CHLORIDE, SODIUM PHOSPHATE, DIBASIC, AND POTASSIUM PHOSPHATE: .53; .5; .37; .037; .03; .012; .00082 INJECTION, SOLUTION INTRAVENOUS at 01:05

## 2018-05-30 RX ADMIN — SODIUM CHLORIDE, SODIUM GLUCONATE, SODIUM ACETATE, POTASSIUM CHLORIDE, MAGNESIUM CHLORIDE, SODIUM PHOSPHATE, DIBASIC, AND POTASSIUM PHOSPHATE: .53; .5; .37; .037; .03; .012; .00082 INJECTION, SOLUTION INTRAVENOUS at 02:05

## 2018-05-30 RX ADMIN — ROCURONIUM BROMIDE 50 MG: 10 INJECTION, SOLUTION INTRAVENOUS at 03:05

## 2018-05-30 RX ADMIN — STANDARDIZED SENNA CONCENTRATE AND DOCUSATE SODIUM 1 TABLET: 8.6; 5 TABLET, FILM COATED ORAL at 09:05

## 2018-05-30 RX ADMIN — DEXMEDETOMIDINE HYDROCHLORIDE 4 MCG: 100 INJECTION, SOLUTION, CONCENTRATE INTRAVENOUS at 05:05

## 2018-05-30 RX ADMIN — FENTANYL CITRATE 100 MCG: 50 INJECTION, SOLUTION INTRAMUSCULAR; INTRAVENOUS at 12:05

## 2018-05-30 RX ADMIN — PHENYLEPHRINE HYDROCHLORIDE 200 MCG: 10 INJECTION INTRAVENOUS at 01:05

## 2018-05-30 RX ADMIN — EPHEDRINE SULFATE 5 MG: 50 INJECTION, SOLUTION INTRAMUSCULAR; INTRAVENOUS; SUBCUTANEOUS at 01:05

## 2018-05-30 RX ADMIN — Medication 30 MG: at 12:05

## 2018-05-30 RX ADMIN — ONDANSETRON 8 MG: 4 TABLET, FILM COATED ORAL at 10:05

## 2018-05-30 RX ADMIN — PROPOFOL 50 MG: 10 INJECTION, EMULSION INTRAVENOUS at 02:05

## 2018-05-30 RX ADMIN — CEPHALEXIN 500 MG: 500 CAPSULE ORAL at 09:05

## 2018-05-30 RX ADMIN — NEOSTIGMINE METHYLSULFATE 5 MG: 1 INJECTION INTRAVENOUS at 06:05

## 2018-05-30 RX ADMIN — CEFAZOLIN 2 G: 330 INJECTION, POWDER, FOR SOLUTION INTRAMUSCULAR; INTRAVENOUS at 12:05

## 2018-05-30 RX ADMIN — DEXMEDETOMIDINE HYDROCHLORIDE 25 MCG: 100 INJECTION, SOLUTION, CONCENTRATE INTRAVENOUS at 02:05

## 2018-05-30 RX ADMIN — GLYCOPYRROLATE 0.2 MG: 0.2 INJECTION, SOLUTION INTRAMUSCULAR; INTRAVENOUS at 12:05

## 2018-05-30 RX ADMIN — OXYCODONE HYDROCHLORIDE AND ACETAMINOPHEN 1 TABLET: 5; 325 TABLET ORAL at 10:05

## 2018-05-30 RX ADMIN — GLYCOPYRROLATE 0.6 MG: 0.2 INJECTION, SOLUTION INTRAMUSCULAR; INTRAVENOUS at 06:05

## 2018-05-30 RX ADMIN — LIDOCAINE HYDROCHLORIDE 80 MG: 20 INJECTION, SOLUTION INTRAVENOUS at 12:05

## 2018-05-30 RX ADMIN — PROPOFOL 200 MG: 10 INJECTION, EMULSION INTRAVENOUS at 12:05

## 2018-05-30 RX ADMIN — EPHEDRINE SULFATE 10 MG: 50 INJECTION, SOLUTION INTRAMUSCULAR; INTRAVENOUS; SUBCUTANEOUS at 12:05

## 2018-05-30 RX ADMIN — PROPOFOL 20 MG: 10 INJECTION, EMULSION INTRAVENOUS at 01:05

## 2018-05-30 NOTE — BRIEF OP NOTE
[facility]  Surgery Department  Operative Note    SUMMARY     Date of Procedure: 5/30/2018     Procedure: Procedure(s) (LRB):  EXCISION, PAROTID GLAND (Left)  DISSECTION, NECK (Left)     Surgeon(s) and Role:     * Regan King MD - Primary     * Navid Harrison MD - Resident - Assisting     * Avinash Castellon MD - Resident - Assisting        Pre-Operative Diagnosis: Neck mass [R22.1]    Post-Operative Diagnosis: Post-Op Diagnosis Codes:     * Neck mass [R22.1]    Anesthesia: General    Technical Procedures Used: see full operative note         Complications: No    Estimated Blood Loss (EBL): 200 mL           Implants: * No implants in log *    Specimens:   Specimen (12h ago through future)    Start     Ordered    05/30/18 1814  Specimen to Pathology - Surgery  Once     Comments:  1. Left Neck Mass - Frozen  - sent2. Left Level 2 Lymph Node - Frozen - sent3. Left Radical Parotidectomy - permanent - sent for flow cytometery4. Left Neck dissection Level 2 - permanent5. Left Neck dissection Level 3 - permanent6. Left Neck dissection Level 4 - permanent      05/30/18 1818                  Condition: Good    Disposition: PACU - hemodynamically stable.    Attestation: I was present and scrubbed for the entire procedure.

## 2018-05-30 NOTE — ANESTHESIA PREPROCEDURE EVALUATION
[facility]  Anesthesia Pre-Operative Evaluation         Patient Name: Arjun Mcpherson Jr.  YOB: 1955  MRN: [id number]    SUBJECTIVE:     Pre-operative evaluation for Procedure(s) (LRB):  EXCISION, PAROTID GLAND (Left)  DISSECTION, NECK (Left)     05/30/2018    Arjun Mcpherson Jr. is a 62 y.o. male w/ a significant PMHx of T2DM, parotid mass.    Patient now presents for the above procedure(s).      LDA: None documented.       Prev airway: None documented.    Drips: None documented.      Patient Active Problem List   Diagnosis    Sensation of fullness in both ears    Impacted cerumen of both ears    Parotid mass    Neck mass       Review of patient's allergies indicates:  No Known Allergies    Current Inpatient Medications:      No current facility-administered medications on file prior to encounter.      No current outpatient prescriptions on file prior to encounter.       No past surgical history on file.    Social History     Social History    Marital status:      Spouse name: N/A    Number of children: N/A    Years of education: N/A     Occupational History    Not on file.     Social History Main Topics    Smoking status: Never Smoker    Smokeless tobacco: Never Used    Alcohol use No    Drug use: Unknown    Sexual activity: Not on file     Other Topics Concern    Not on file     Social History Narrative    No narrative on file       OBJECTIVE:     Vital Signs Range (Last 24H):  Temp:  [37.1 °C (98.7 °F)]   Pulse:  [79]   BP: (137)/(88)       CBC:   No results for input(s): WBC, RBC, HGB, HCT, PLT, MCV, MCH, MCHC in the last 72 hours.    CMP: No results for input(s): NA, K, CL, CO2, BUN, CREATININE, GLU, MG, PHOS, CALCIUM, ALBUMIN, PROT, ALKPHOS, ALT, AST, BILITOT in the last 72 hours.    INR:  No results for input(s): PT, INR, PROTIME, APTT in the last 72 hours.    Diagnostic Studies: No relevant studies.    EKG: No recent studies available.    2D  ECHO:  No results found for this or any previous visit.      ASSESSMENT/PLAN:         Anesthesia Evaluation    I have reviewed the Patient Summary Reports.    I have reviewed the Nursing Notes.   I have reviewed the Medications.     Review of Systems  Anesthesia Hx:  History of prior surgery of interest to airway management or planning: Denies Family Hx of Anesthesia complications.   Denies Personal Hx of Anesthesia complications.   Social:  Non-Smoker    Cardiovascular:   Denies MI.  Denies CAD.    Denies CABG/stent.  Denies Dysrhythmias.   Denies Angina.    Pulmonary:   Denies Pneumonia Denies COPD.  Denies Asthma.  Denies Shortness of breath.    Renal/:   Denies Chronic Renal Disease.     Hepatic/GI:   Denies Liver Disease.    Neurological:   Denies CVA. Denies Seizures.    Endocrine:   Diabetes, type 2           Anesthesia Plan  Type of Anesthesia, risks & benefits discussed:  Anesthesia Type:  general  Patient's Preference:   Intra-op Monitoring Plan: standard ASA monitors  Intra-op Monitoring Plan Comments:   Post Op Pain Control Plan: per primary service following discharge from PACU  Post Op Pain Control Plan Comments:   Induction:   IV  Beta Blocker:  Patient is not currently on a Beta-Blocker (No further documentation required).       Informed Consent: Patient understands risks and agrees with Anesthesia plan.  Questions answered. Anesthesia consent signed with patient.  ASA Score: 2     Day of Surgery Review of History & Physical:    H&P update referred to the surgeon.         Ready For Surgery From Anesthesia Perspective.

## 2018-05-31 LAB
ANION GAP SERPL CALC-SCNC: 8 MMOL/L
BASOPHILS # BLD AUTO: 0.02 K/UL
BASOPHILS NFR BLD: 0.2 %
BUN SERPL-MCNC: 9 MG/DL
CALCIUM SERPL-MCNC: 8.5 MG/DL
CHLORIDE SERPL-SCNC: 102 MMOL/L
CO2 SERPL-SCNC: 26 MMOL/L
CREAT SERPL-MCNC: 0.9 MG/DL
DIFFERENTIAL METHOD: ABNORMAL
EOSINOPHIL # BLD AUTO: 0 K/UL
EOSINOPHIL NFR BLD: 0 %
ERYTHROCYTE [DISTWIDTH] IN BLOOD BY AUTOMATED COUNT: 12.4 %
EST. GFR  (AFRICAN AMERICAN): >60 ML/MIN/1.73 M^2
EST. GFR  (NON AFRICAN AMERICAN): >60 ML/MIN/1.73 M^2
FLOW CYTOMETRY ANTIBODIES ANALYZED - TISSUE: NORMAL
FLOW CYTOMETRY COMMENT - TISSUE: NORMAL
FLOW CYTOMETRY INTERPRETATION - TISSUE: NORMAL
GLUCOSE SERPL-MCNC: 226 MG/DL
HCT VFR BLD AUTO: 37.7 %
HGB BLD-MCNC: 12.8 G/DL
IMM GRANULOCYTES # BLD AUTO: 0.06 K/UL
IMM GRANULOCYTES NFR BLD AUTO: 0.6 %
LYMPHOCYTES # BLD AUTO: 0.9 K/UL
LYMPHOCYTES NFR BLD: 8.5 %
MCH RBC QN AUTO: 30.8 PG
MCHC RBC AUTO-ENTMCNC: 34 G/DL
MCV RBC AUTO: 91 FL
MONOCYTES # BLD AUTO: 0.4 K/UL
MONOCYTES NFR BLD: 4.3 %
NEUTROPHILS # BLD AUTO: 8.8 K/UL
NEUTROPHILS NFR BLD: 86.4 %
NRBC BLD-RTO: 0 /100 WBC
PLATELET # BLD AUTO: 259 K/UL
PMV BLD AUTO: 9.5 FL
POCT GLUCOSE: 157 MG/DL (ref 70–110)
POCT GLUCOSE: 161 MG/DL (ref 70–110)
POCT GLUCOSE: 163 MG/DL (ref 70–110)
POCT GLUCOSE: 182 MG/DL (ref 70–110)
POTASSIUM SERPL-SCNC: 4.3 MMOL/L
RBC # BLD AUTO: 4.16 M/UL
SODIUM SERPL-SCNC: 136 MMOL/L
SPECIMEN TYPE - TISSUE: NORMAL
WBC # BLD AUTO: 10.14 K/UL

## 2018-05-31 PROCEDURE — 25000003 PHARM REV CODE 250: Performed by: STUDENT IN AN ORGANIZED HEALTH CARE EDUCATION/TRAINING PROGRAM

## 2018-05-31 PROCEDURE — 25000003 PHARM REV CODE 250: Performed by: OTOLARYNGOLOGY

## 2018-05-31 PROCEDURE — 36415 COLL VENOUS BLD VENIPUNCTURE: CPT

## 2018-05-31 PROCEDURE — 80048 BASIC METABOLIC PNL TOTAL CA: CPT

## 2018-05-31 PROCEDURE — 63600175 PHARM REV CODE 636 W HCPCS: Performed by: OTOLARYNGOLOGY

## 2018-05-31 PROCEDURE — 12000002 HC ACUTE/MED SURGE SEMI-PRIVATE ROOM

## 2018-05-31 PROCEDURE — 94761 N-INVAS EAR/PLS OXIMETRY MLT: CPT

## 2018-05-31 PROCEDURE — 85025 COMPLETE CBC W/AUTO DIFF WBC: CPT

## 2018-05-31 RX ORDER — DEXAMETHASONE SODIUM PHOSPHATE 4 MG/ML
8 INJECTION, SOLUTION INTRA-ARTICULAR; INTRALESIONAL; INTRAMUSCULAR; INTRAVENOUS; SOFT TISSUE ONCE
Status: COMPLETED | OUTPATIENT
Start: 2018-05-31 | End: 2018-05-31

## 2018-05-31 RX ADMIN — OXYCODONE HYDROCHLORIDE AND ACETAMINOPHEN 1 TABLET: 5; 325 TABLET ORAL at 04:05

## 2018-05-31 RX ADMIN — BACITRACIN: 500 OINTMENT TOPICAL at 08:05

## 2018-05-31 RX ADMIN — CEPHALEXIN 500 MG: 500 CAPSULE ORAL at 05:05

## 2018-05-31 RX ADMIN — ONDANSETRON 8 MG: 4 TABLET, FILM COATED ORAL at 10:05

## 2018-05-31 RX ADMIN — CEPHALEXIN 500 MG: 500 CAPSULE ORAL at 04:05

## 2018-05-31 RX ADMIN — OXYCODONE HYDROCHLORIDE AND ACETAMINOPHEN 1 TABLET: 5; 325 TABLET ORAL at 10:05

## 2018-05-31 RX ADMIN — STANDARDIZED SENNA CONCENTRATE AND DOCUSATE SODIUM 1 TABLET: 8.6; 5 TABLET, FILM COATED ORAL at 08:05

## 2018-05-31 RX ADMIN — HYDROMORPHONE HYDROCHLORIDE 1 MG: 1 INJECTION, SOLUTION INTRAMUSCULAR; INTRAVENOUS; SUBCUTANEOUS at 04:05

## 2018-05-31 RX ADMIN — PROMETHAZINE HYDROCHLORIDE 25 MG: 25 TABLET ORAL at 04:05

## 2018-05-31 RX ADMIN — DEXAMETHASONE SODIUM PHOSPHATE 8 MG: 4 INJECTION, SOLUTION INTRAMUSCULAR; INTRAVENOUS at 08:05

## 2018-05-31 RX ADMIN — ENOXAPARIN SODIUM 40 MG: 100 INJECTION SUBCUTANEOUS at 04:05

## 2018-05-31 NOTE — ASSESSMENT & PLAN NOTE
63 yo M POD# 1 s/p left radial parotidectomy (sacfrice of lower division), left neck dissection levels II-IV.     - will schedule zofran   - ambulate   - pt/ot   - dc kaminski   - cont LILO   - pain control

## 2018-05-31 NOTE — SUBJECTIVE & OBJECTIVE
Interval History: NAEON, patient relates nausea and vomiting overnight.     Medications:  Continuous Infusions:  Scheduled Meds:   bacitracin   Topical (Top) Q12H    cephALEXin  500 mg Oral Q8H    enoxaparin  40 mg Subcutaneous Daily    senna-docusate 8.6-50 mg  1 tablet Oral BID     PRN Meds:dextrose 50%, dextrose 50%, glucagon (human recombinant), glucose, glucose, HYDROmorphone, insulin aspart U-100, ondansetron, oxyCODONE-acetaminophen, promethazine, sodium chloride 0.9%, zolpidem     Review of patient's allergies indicates:  No Known Allergies  Objective:     Vital Signs (24h Range):  Temp:  [97.6 °F (36.4 °C)-99 °F (37.2 °C)] 98 °F (36.7 °C)  Pulse:  [55-87] 79  Resp:  [14-26] 18  SpO2:  [26 %-100 %] 94 %  BP: (145-164)/(78-97) 151/88        Lines/Drains/Airways     Drain                 Closed/Suction Drain 05/30/18 1821 Left Neck Bulb 15 Fr. less than 1 day         Closed/Suction Drain 05/30/18 1821 Left Neck Bulb 15 Fr. less than 1 day         Urethral Catheter 05/30/18 1230 Non-latex;Straight-tip;Temperature probe 16 Fr. less than 1 day          Peripheral Intravenous Line                 Peripheral IV - Single Lumen 05/30/18 1234 Right Hand less than 1 day                Physical Exam  NAD A&O x 3   Expect facial weakness in left lower division, good movement in mid and upper division with good eye closure   Neck soft incision c/d/i, LILO in place x 2, 30 and 35 cc SS   Normal work of breathing     Significant Labs:  CBC:   Recent Labs  Lab 05/31/18 0422   WBC 10.14   RBC 4.16*   HGB 12.8*   HCT 37.7*      MCV 91   MCH 30.8   MCHC 34.0     CMP:   Recent Labs  Lab 05/31/18 0422   *   CALCIUM 8.5*      K 4.3   CO2 26      BUN 9   CREATININE 0.9       Significant Diagnostics:  None

## 2018-05-31 NOTE — PROGRESS NOTES
Yvonne with Dr. King for patient to go to [address] [place] [address] if no rooms available on 6th floor.

## 2018-05-31 NOTE — PROGRESS NOTES
[facility]  Otorhinolaryngology-Head & Neck Surgery  Progress Note    Subjective:     Post-Op Info:  Procedure(s) (LRB):  EXCISION, PAROTID GLAND (Left)  DISSECTION, NECK (Left)   1 Day Post-Op  Hospital Day: 2     Interval History: NAEON, patient relates nausea and vomiting overnight.     Medications:  Continuous Infusions:  Scheduled Meds:   bacitracin   Topical (Top) Q12H    cephALEXin  500 mg Oral Q8H    enoxaparin  40 mg Subcutaneous Daily    senna-docusate 8.6-50 mg  1 tablet Oral BID     PRN Meds:dextrose 50%, dextrose 50%, glucagon (human recombinant), glucose, glucose, HYDROmorphone, insulin aspart U-100, ondansetron, oxyCODONE-acetaminophen, promethazine, sodium chloride 0.9%, zolpidem     Review of patient's allergies indicates:  No Known Allergies  Objective:     Vital Signs (24h Range):  Temp:  [97.6 °F (36.4 °C)-99 °F (37.2 °C)] 98 °F (36.7 °C)  Pulse:  [55-87] 79  Resp:  [14-26] 18  SpO2:  [26 %-100 %] 94 %  BP: (145-164)/(78-97) 151/88        Lines/Drains/Airways     Drain                 Closed/Suction Drain 05/30/18 1821 Left Neck Bulb 15 Fr. less than 1 day         Closed/Suction Drain 05/30/18 1821 Left Neck Bulb 15 Fr. less than 1 day         Urethral Catheter 05/30/18 1230 Non-latex;Straight-tip;Temperature probe 16 Fr. less than 1 day          Peripheral Intravenous Line                 Peripheral IV - Single Lumen 05/30/18 1234 Right Hand less than 1 day                Physical Exam  NAD A&O x 3   Expect facial weakness in left lower division, good movement in mid and upper division with good eye closure   Neck soft incision c/d/i, LILO in place x 2, 30 and 35 cc SS   Normal work of breathing     Significant Labs:  CBC:   Recent Labs  Lab 05/31/18  0422   WBC 10.14   RBC 4.16*   HGB 12.8*   HCT 37.7*      MCV 91   MCH 30.8   MCHC 34.0     CMP:   Recent Labs  Lab 05/31/18  0422   *   CALCIUM 8.5*      K 4.3   CO2 26      BUN 9   CREATININE 0.9        Significant Diagnostics:  None    Assessment/Plan:     Neck mass    63 yo M POD# 1 s/p left radial parotidectomy (sacfrice of lower division), left neck dissection levels II-IV.     - will schedule zofran   - ambulate   - pt/ot   - dc kaminski   - cont LILO   - pain control             Navid Harrison MD  Otorhinolaryngology-Head & Neck Surgery  [facility]

## 2018-05-31 NOTE — PROGRESS NOTES
Patient's wife at bedside, patient awakens to voice, Ox3, complains of pain, admin PRN medication, patient drifts to sleep, VSS on Room air, NAD, Updated patient's wife on room assignment and plan of care. tm

## 2018-05-31 NOTE — PLAN OF CARE
Patient is a 62 year old male admitted from home and underwent Left Neck Dissection wit hMass Excision, Left Parotidectomy 5/30/2018.  Patient is expected to discharge home with no needs +/- 6/1/2018.    Patient lives in a one story home with his kids and wife, Mylene, who will drive him home, care for him and obtain anything he needs after discharge.  [facility] with understanding verbalized.  Will continue to follow for needs.    PCP  Jason Santo MD  [address] [facility] [id number] / [place] [id number] [phone]      [employer] [address] [place] [address] AT [facility] [address]  Phone: [phone] Fax: [phone]      Extended Emergency Contact Information  Primary Emergency Contact: Mylene Mcpherson  Address: [address] [place] [address] [place]  Home Phone: [phone]  Mobile Phone: [phone]  Relation: Spouse       05/31/18 1634   Discharge Assessment   Assessment Type Discharge Planning Assessment   Confirmed/corrected address and phone number on facesheet? Yes   Assessment information obtained from? Patient   Expected Length of Stay (days) 3   Communicated expected length of stay with patient/caregiver yes   Prior to hospitilization cognitive status: Alert/Oriented   Prior to hospitalization functional status: Independent   Current cognitive status: Alert/Oriented   Current Functional Status: Independent   Lives With spouse;child(charles), adult   Able to Return to Prior Arrangements yes   Is patient able to care for self after discharge? Yes   Who are your caregiver(s) and their phone number(s)? family   Patient's perception of discharge disposition home or selfcare   Equipment Currently Used at Home none   Do you have any problems affording any of your prescribed medications? No   Is the patient taking medications as prescribed? yes    Does the patient have transportation home? Yes   Transportation Available family or friend will provide   Discharge Plan A Home with family   Discharge Plan B Home with family;Home Health   Patient/Family In Agreement With Plan yes

## 2018-05-31 NOTE — PLAN OF CARE
Problem: Patient Care Overview  Goal: Plan of Care Review  Outcome: Ongoing (interventions implemented as appropriate)  Pt verbalized understanding of ongoing care

## 2018-05-31 NOTE — ANESTHESIA POSTPROCEDURE EVALUATION
"Anesthesia Post Evaluation    Patient: Arjun Mcpherson Jr.    Procedure(s) Performed: Procedure(s) (LRB):  EXCISION, PAROTID GLAND (Left)  DISSECTION, NECK (Left)    Final Anesthesia Type: general  Patient location during evaluation: PACU  Patient participation: Yes- Able to Participate  Level of consciousness: awake and alert  Post-procedure vital signs: reviewed and stable  Pain management: adequate  Airway patency: patent  PONV status at discharge: No PONV  Anesthetic complications: no      Cardiovascular status: blood pressure returned to baseline  Respiratory status: unassisted  Hydration status: euvolemic  Follow-up not needed.        Visit Vitals  BP (!) 149/84 (BP Location: Right arm, Patient Position: Lying)   Pulse (!) 55   Temp 36.9 °C (98.5 °F) (Oral)   Resp 20   Ht 6' 2" (1.88 m)   Wt 111.1 kg (245 lb)   SpO2 96%   BMI 31.46 kg/m²       Pain/Igor Score: Pain Assessment Performed: Yes (5/30/2018  9:19 PM)  Presence of Pain: complains of pain/discomfort (5/30/2018 10:00 PM)  Pain Rating Prior to Med Admin: 7 (5/30/2018 10:49 PM)  Pain Rating Post Med Admin: 4 (5/30/2018 11:42 AM)  Igor Score: 9 (5/30/2018  9:19 PM)      "

## 2018-05-31 NOTE — TRANSFER OF CARE
"Anesthesia Transfer of Care Note    Patient: Arjun Mcpherson Jr.    Procedure(s) Performed: Procedure(s) (LRB):  EXCISION, PAROTID GLAND (Left)  DISSECTION, NECK (Left)    Patient location: PACU    Anesthesia Type: general    Transport from OR: Transported from OR on 6-10 L/min O2 by face mask with adequate spontaneous ventilation    Post pain: adequate analgesia    Post assessment: no apparent anesthetic complications and tolerated procedure well    Post vital signs: stable    Level of consciousness: awake and alert    Nausea/Vomiting: no nausea/vomiting    Complications: none    Transfer of care protocol was followed      Last vitals:   Visit Vitals  BP (!) 147/97 (BP Location: Left arm, Patient Position: Lying)   Pulse 87   Temp 37.2 °C (99 °F) (Oral)   Resp 18   Ht 6' 2" (1.88 m)   Wt 111.1 kg (245 lb)   SpO2 98%   BMI 31.46 kg/m²     "

## 2018-06-01 VITALS
RESPIRATION RATE: 18 BRPM | DIASTOLIC BLOOD PRESSURE: 71 MMHG | BODY MASS INDEX: 31.43 KG/M2 | SYSTOLIC BLOOD PRESSURE: 124 MMHG | OXYGEN SATURATION: 96 % | TEMPERATURE: 99 F | WEIGHT: 244.94 LBS | HEART RATE: 79 BPM | HEIGHT: 74 IN

## 2018-06-01 LAB — POCT GLUCOSE: 125 MG/DL (ref 70–110)

## 2018-06-01 PROCEDURE — 25000003 PHARM REV CODE 250: Performed by: OTOLARYNGOLOGY

## 2018-06-01 RX ORDER — OXYCODONE AND ACETAMINOPHEN 5; 325 MG/1; MG/1
1 TABLET ORAL EVERY 6 HOURS PRN
Qty: 28 TABLET | Refills: 0 | Status: SHIPPED | OUTPATIENT
Start: 2018-06-01 | End: 2018-06-08 | Stop reason: SDUPTHER

## 2018-06-01 RX ORDER — ONDANSETRON HYDROCHLORIDE 8 MG/1
8 TABLET, FILM COATED ORAL EVERY 6 HOURS PRN
Qty: 21 TABLET | Refills: 0 | Status: SHIPPED | OUTPATIENT
Start: 2018-06-01 | End: 2018-06-19 | Stop reason: SDUPTHER

## 2018-06-01 RX ORDER — CEPHALEXIN 500 MG/1
500 CAPSULE ORAL EVERY 8 HOURS
Qty: 30 CAPSULE | Refills: 0 | Status: SHIPPED | OUTPATIENT
Start: 2018-06-01 | End: 2018-10-30 | Stop reason: ALTCHOICE

## 2018-06-01 RX ORDER — OXYCODONE AND ACETAMINOPHEN 5; 325 MG/1; MG/1
1 TABLET ORAL EVERY 6 HOURS PRN
Qty: 28 TABLET | Refills: 0 | Status: SHIPPED | OUTPATIENT
Start: 2018-06-01 | End: 2018-06-01

## 2018-06-01 RX ORDER — BACITRACIN 500 [USP'U]/G
OINTMENT TOPICAL EVERY 12 HOURS
Qty: 14 G | Refills: 1 | Status: ON HOLD | OUTPATIENT
Start: 2018-06-01 | End: 2018-11-01

## 2018-06-01 RX ORDER — BACITRACIN 500 [USP'U]/G
OINTMENT TOPICAL EVERY 12 HOURS
Refills: 0 | COMMUNITY
Start: 2018-06-01 | End: 2018-06-01

## 2018-06-01 RX ORDER — AMOXICILLIN 250 MG
1 CAPSULE ORAL 2 TIMES DAILY
Status: ON HOLD | COMMUNITY
Start: 2018-06-01 | End: 2018-11-01

## 2018-06-01 RX ORDER — AMOXICILLIN 250 MG
1 CAPSULE ORAL 2 TIMES DAILY
COMMUNITY
Start: 2018-06-01 | End: 2018-06-01

## 2018-06-01 RX ORDER — CEPHALEXIN 500 MG/1
500 CAPSULE ORAL EVERY 8 HOURS
Qty: 30 CAPSULE | Refills: 0 | Status: SHIPPED | OUTPATIENT
Start: 2018-06-01 | End: 2018-06-01

## 2018-06-01 RX ORDER — ONDANSETRON HYDROCHLORIDE 8 MG/1
8 TABLET, FILM COATED ORAL EVERY 6 HOURS PRN
Qty: 21 TABLET | Refills: 0 | Status: SHIPPED | OUTPATIENT
Start: 2018-06-01 | End: 2018-06-01

## 2018-06-01 RX ADMIN — OXYCODONE HYDROCHLORIDE AND ACETAMINOPHEN 1 TABLET: 5; 325 TABLET ORAL at 04:06

## 2018-06-01 RX ADMIN — CEPHALEXIN 500 MG: 500 CAPSULE ORAL at 12:06

## 2018-06-01 RX ADMIN — BACITRACIN: 500 OINTMENT TOPICAL at 09:06

## 2018-06-01 RX ADMIN — OXYCODONE HYDROCHLORIDE AND ACETAMINOPHEN 1 TABLET: 5; 325 TABLET ORAL at 12:06

## 2018-06-01 RX ADMIN — STANDARDIZED SENNA CONCENTRATE AND DOCUSATE SODIUM 1 TABLET: 8.6; 5 TABLET, FILM COATED ORAL at 09:06

## 2018-06-01 NOTE — PT/OT/SLP PROGRESS
Physical Therapy   Discharge    Arjun Kymarbella Savage   MRN: [id number]     Hospital discharge before PT eval could be initiated.      Maurice Wolff, PT  6/1/2018

## 2018-06-01 NOTE — NURSING
Pt discharging to home per MD orders.  LILO drain in place to left side of neck. Pt and spouse given hand written instructions on how to care for LILO drain and verbalize understanding. Teaching also demonstrated to spouse. PT accompanied by spouse and staff via wc.

## 2018-06-01 NOTE — SUBJECTIVE & OBJECTIVE
Interval History: NAEON, nausea resolved. Tolerating diet. Ambulating.     Medications:  Continuous Infusions:  Scheduled Meds:   bacitracin   Topical (Top) Q12H    cephALEXin  500 mg Oral Q8H    enoxaparin  40 mg Subcutaneous Daily    senna-docusate 8.6-50 mg  1 tablet Oral BID     PRN Meds:dextrose 50%, dextrose 50%, glucagon (human recombinant), glucose, glucose, HYDROmorphone, insulin aspart U-100, ondansetron, oxyCODONE-acetaminophen, promethazine, sodium chloride 0.9%, zolpidem     Review of patient's allergies indicates:  No Known Allergies  Objective:     Vital Signs (24h Range):  Temp:  [96.4 °F (35.8 °C)-98.3 °F (36.8 °C)] 97.7 °F (36.5 °C)  Pulse:  [73-91] 75  Resp:  [16] 16  SpO2:  [90 %-97 %] 90 %  BP: (130-145)/(76-83) 138/83        Lines/Drains/Airways     Drain                 Closed/Suction Drain 05/30/18 1821 Left Neck Bulb 15 Fr. 1 day         Closed/Suction Drain 05/30/18 1821 Left Neck Bulb 15 Fr. 1 day         Urethral Catheter 05/30/18 1230 Non-latex;Straight-tip;Temperature probe 16 Fr. 1 day          Peripheral Intravenous Line                 Peripheral IV - Single Lumen 05/30/18 1234 Right Hand 1 day                Physical Exam  NAD A&O x 3   Expected facial weakness in left lower division, good movement in mid and upper division with good eye closure   Neck soft incision c/d/i, LILO in place x 2, 105 and 80 cc SS   Normal work of breathing   Significant Labs:  CBC:   Recent Labs  Lab 05/31/18  0422   WBC 10.14   RBC 4.16*   HGB 12.8*   HCT 37.7*      MCV 91   MCH 30.8   MCHC 34.0     CMP:   Recent Labs  Lab 05/31/18  0422   *   CALCIUM 8.5*      K 4.3   CO2 26      BUN 9   CREATININE 0.9       Significant Diagnostics:  None

## 2018-06-01 NOTE — NURSING
Pt awake, alert dressed sitting up in chair at the bedside. Took and tolerated po meds.  Safety precautions in place.

## 2018-06-01 NOTE — ASSESSMENT & PLAN NOTE
63 yo M POD# 2 s/p left radial parotidectomy (sacfrice of lower division), left neck dissection levels II-IV.     - doing well   - advance diet   - ambulate   - cont LILO   - discharge home

## 2018-06-01 NOTE — PROGRESS NOTES
[facility]  Otorhinolaryngology-Head & Neck Surgery  Progress Note    Subjective:     Post-Op Info:  Procedure(s) (LRB):  EXCISION, PAROTID GLAND (Left)  DISSECTION, NECK (Left)   2 Days Post-Op  Hospital Day: 3     Interval History: NAEON, nausea resolved. Tolerating diet. Ambulating.     Medications:  Continuous Infusions:  Scheduled Meds:   bacitracin   Topical (Top) Q12H    cephALEXin  500 mg Oral Q8H    enoxaparin  40 mg Subcutaneous Daily    senna-docusate 8.6-50 mg  1 tablet Oral BID     PRN Meds:dextrose 50%, dextrose 50%, glucagon (human recombinant), glucose, glucose, HYDROmorphone, insulin aspart U-100, ondansetron, oxyCODONE-acetaminophen, promethazine, sodium chloride 0.9%, zolpidem     Review of patient's allergies indicates:  No Known Allergies  Objective:     Vital Signs (24h Range):  Temp:  [96.4 °F (35.8 °C)-98.3 °F (36.8 °C)] 97.7 °F (36.5 °C)  Pulse:  [73-91] 75  Resp:  [16] 16  SpO2:  [90 %-97 %] 90 %  BP: (130-145)/(76-83) 138/83        Lines/Drains/Airways     Drain                 Closed/Suction Drain 05/30/18 1821 Left Neck Bulb 15 Fr. 1 day         Closed/Suction Drain 05/30/18 1821 Left Neck Bulb 15 Fr. 1 day         Urethral Catheter 05/30/18 1230 Non-latex;Straight-tip;Temperature probe 16 Fr. 1 day          Peripheral Intravenous Line                 Peripheral IV - Single Lumen 05/30/18 1234 Right Hand 1 day                Physical Exam  NAD A&O x 3   Expected facial weakness in left lower division, good movement in mid and upper division with good eye closure   Neck soft incision c/d/i, LILO in place x 2, 105 and 80 cc SS   Normal work of breathing   Significant Labs:  CBC:   Recent Labs  Lab 05/31/18  0422   WBC 10.14   RBC 4.16*   HGB 12.8*   HCT 37.7*      MCV 91   MCH 30.8   MCHC 34.0     CMP:   Recent Labs  Lab 05/31/18  0422   *   CALCIUM 8.5*      K 4.3   CO2 26      BUN 9   CREATININE 0.9       Significant  Diagnostics:  None    Assessment/Plan:     Neck mass    63 yo M POD# 2 s/p left radial parotidectomy (sacfrice of lower division), left neck dissection levels II-IV.     - doing well   - advance diet   - ambulate   - cont LILO   - discharge home             Navid Harrison MD  Otorhinolaryngology-Head & Neck Surgery  [facility]

## 2018-06-01 NOTE — DISCHARGE SUMMARY
"[facility]  Discharge Summary      Admit Date: 5/30/2018    Discharge Date and Time:  06/01/2018 7:03 AM    Attending Physician: Regan King MD         Procedures Performed: Procedure(s) (LRB):  EXCISION, PAROTID GLAND (Left)  DISSECTION, NECK (Left)    Hospital Course  Following completion of an electively scheduled procedure, the patient was transferred to the floor for postoperative monitoring.His  hospital course was uneventful and noted for adequate pain control and PO intake following surgery. He   is discharged home in good condition and will follow-up with Dr. Sanabria          Consults: none      Final Diagnoses:    Principal Problem: Neck mass   Secondary Diagnoses:   Active Hospital Problems    Diagnosis  POA    *Neck mass [R22.1]  Yes      Resolved Hospital Problems    Diagnosis Date Resolved POA   No resolved problems to display.       Discharged Condition: good    Disposition: Home or Self Care    Follow Up/Patient Instructions:     Medications:  Reconciled Home Medications:      Medication List      START taking these medications    bacitracin 500 unit/gram ointment  Apply topically every 12 (twelve) hours.     cephALEXin 500 MG capsule  Commonly known as:  KEFLEX  Take 1 capsule (500 mg total) by mouth every 8 (eight) hours.     ondansetron 8 MG tablet  Commonly known as:  ZOFRAN  Take 1 tablet (8 mg total) by mouth every 6 (six) hours as needed.     oxyCODONE-acetaminophen 5-325 mg per tablet  Commonly known as:  PERCOCET  Take 1 tablet by mouth every 6 (six) hours as needed.     senna-docusate 8.6-50 mg 8.6-50 mg per tablet  Commonly known as:  PERICOLACE  Take 1 tablet by mouth 2 (two) times daily.        CONTINUE taking these medications    metFORMIN 500 MG tablet  Commonly known as:  GLUCOPHAGE  Take 1,000 mg by mouth 2 (two) times daily with meals. Take 1,000mg in the am and 500mg at 6pm            Discharge Procedure Orders  Diet diabetic     Other restrictions " (specify):   Order Comments: No heavy lifting > 10 lbs x 2 weeks. Keep Incision clean and dry. Keep open to air after 48 hrs. Okay to shower after 48 hrs. DO NOT scrub, soak, or submerge incision. Pad to dry. Apply bacitracin ointment to incision BID prn.  Patient to strip drain and record output TID. Please call clinic and notify nurse of drain output the morning of follow up appointment     No dressing needed       Follow-up Information     Regan King MD. Schedule an appointment as soon as possible for a visit on 6/4/2018.    Specialty:  Otolaryngology  Why:  for drain removal   Contact information:  [address]  [facility] [address]  [phone]

## 2018-06-02 NOTE — OP NOTE
DATE OF PROCEDURE:  05/30/2018.    PREOPERATIVE DIAGNOSES:  1.  Left parotid mass worrisome for malignancy.  2.  Left cervical lymphadenopathy, worrisome for malignancy.    POSTOPERATIVE DIAGNOSIS:  Malignant neoplasm, left parotid gland/submandibular   gland/cervical lymphatics.    PROCEDURES PERFORMED:  1.  Left radical parotidectomy with resection of the inferior division of the   facial nerve and masseter muscle with preservation of the main trunk and   superior division of the facial nerve.  2.  Left neck dissection involving levels 1B through 4.    SURGEON:  Regan King M.D.    ASSISTANTS:  1.  Navid Harrison M.D. (RES).  2.  Avinash Castellon M.D. (RES).    ESTIMATED BLOOD LOSS:  200 mL.    SPECIMENS:  1.  Left neck mass for frozen.  2.  Left level 2 lymph node for frozen.  3.  Left radical parotidectomy for permanent.  4.  Left neck dissection level 2 for permanent.  5.  Left neck dissection level 3 for permanent.  6.  Left neck dissection level 4 for permanent.    Please note that the left level 2 neck dissection included level 1B.    INDICATIONS FOR PROCEDURE:  Mr. Mcpherson is a 62-year-old gentleman who   recently presented to me with recent onset of a left-sided neck mass.  On   imaging, this appeared to involve the submandibular gland inferiorly and parotid   gland superiorly.  Fine-needle aspiration of this lesion x2 was essentially   nondiagnostic.  At the pathologist's recommendation and also in an effort to   expedite his care, I recommended that we proceed to the Operating Room for open   biopsy of this lesion.  Should it prove consistent with carcinoma, we would   proceed with a definitive oncologic operation.  CT scan of the chest was   obtained, which failed to reveal any evidence of metastatic disease.  I   explained to him that he would require resection of the inferior division of his   facial nerve given his preoperative marginal mandibular weakness.  I also   apprised him of the  risks, benefits and alternatives to surgery.  In spite of   the risks inherent to surgery, he provided informed consent for the   aforementioned procedures.    PROCEDURE IN DETAIL:  The patient was taken to the Operating Room and placed on   the operating table in supine position.  General endotracheal anesthesia was   induced by the Anesthesia Team.  The facial nerve monitor was attached to the   left face and calibrated in the standard fashion.    To begin, a preauricular incision was marked, extending down into the neck in   order to facilitate parotidectomy and neck dissection.  The intended incision   site was then injected with several milliliters of 1% lidocaine with   epinephrine.  The neck and face were then prepped and draped in a standard   sterile fashion.    To begin, a #15 blade was utilized to incise the skin with sharp dissection   proceeding through the underlying subcutaneous tissue and platysmal muscle.  A   superiorly based subplatysmal flap was elevated in the neck, extending over the   mandible.  As we approached the tumor at the level of the mandible, a portion of   the platysma muscle was left down in order to allow for adequate resection of   the tumor.  Dissection then continued anteriorly to expose the entirety of the   parotid gland.  Dissection then proceeded posteriorly and inferiorly to   facilitate neck dissection as described above.    To begin, the anterior border of the sternocleidomastoid muscle was   skeletonized, freeing the tail of the parotid from the sternocleidomastoid.  The   digastric was identified in its standard position.  Dissection then proceeded   superiorly, freeing the posterior aspect of the parotid gland from the external   auditory canal.  Dissection then proceeded down the external auditory canal to   identify the styloid process and ultimately the main trunk of the facial nerve.    Blunt dissection then proceeded along the main trunk of the facial nerve to  the   pes anserinus.  Of note, there was no discretely identifiable inferior division   of the facial nerve as this was enveloped with tumor.  The superior division,   however, was readily identified and the branches to the upper face and mid face   were dissected dividing the overlying parotid gland.  These branches as well as   the main trunk of the facial nerve were identified and preserved.  With these   branches a safe distance from the tumor, dissection then proceeded inferiorly.    Bovie electrocautery was utilized to divide the anterior aspect of the parotid   gland.  It became apparent that the tumor was adherent to the masseter muscle,   so the inferior aspect of the masseter was incised and elevated off of the   underlying mandible.  Dissection proceeded from anterior to posterior,   identifying the retromandibular vein, which was isolated, doubly clamped,   divided, and suture ligated, and ultimately, the external carotid artery, which   was preserved as the tumor was peeled off of it.  The attachments to the   parapharyngeal space were then freed with the nerve in full view, keeping it   safe.  The specimen was then swept inferiorly as level 1B was addressed.  The   periosteum of the mandible had been incised as we elevated the masseter.  This   dissection continued inferiorly to identify the facial vessels, which were   isolated, doubly clamped, divided, and suture ligated.  The digastric muscle was   then identified, skeletonized from anterior to posterior, and our attention was   turned to the remainder of the neck dissection.  The spinal accessory nerve was   identified in standard position and traced cephalad to the skull base to its   intersection with the internal jugular vein and digastric muscle.  With the   internal jugular vein in full view as well as the spinal accessory nerve, level   2B was taken down.  The fibrofatty tissue in this area was divided down to the   underlying deep cervical  fascia and swept deep to the spinal accessory nerve.    Dissection then proceeded through levels 2A, 3 and 4.  The omohyoid muscle was   isolated and divided.  The fibrofatty tissue was divided down to the underlying   deep cervical fascia and cervical rootlets.  The specimen was then elevated from   posterior to anterior, identifying and preserving the transverse cervical   vessels as well.  Inferiorly, the specimen was freed from the lateral border of   the internal jugular vein.  Several pedicles were isolated and either clamped or   clipped and divided in order to prevent a chyle leak.  The specimen was then   elevated from posterior to anterior until it was pedicled only on the carotid   sheath.  The carotid sheath was bluntly opened and the attachments from the   specimen, the vagus nerve and common carotid artery were freed.  The final   attachments from the specimen and the internal jugular vein were then divided   utilizing a #15 blade.  When the specimen had been completely freed, it was   divided into levels as above and sent to Pathology for permanent analysis.  The   neck was irrigated with copious amounts of normal saline and suctioned dry.    Hemostasis was achieved with electrocautery.  Of note, the main trunk and upper   division of the facial nerve stimulated readily with 1 milliampere of current   via the Prass probe at the conclusion of the case.  The neck was then closed   over two 15-Occitan Yaakov drains, one in the lateral gutter and one running   anteriorly and superiorly into the parotid bed.  These were secured with silk   suture.  The neck was then closed with 3-0 Vicryl, 4-0 Monocryl and Dermabond   and the patient was handed back to Anesthesia, awakened, extubated and   transported to Recovery in satisfactory condition.  There were no intraoperative   complications.  I was present and participated in the entire procedure.      CPH/AZALIA  dd: 06/02/2018 10:22:47 (CDT)  td: 06/02/2018 11:15:09  (CDT)  Doc ID   #[id number]  Job ID #[id number]    CC:

## 2018-06-02 NOTE — PLAN OF CARE
Patient discharged home 6/1/2018 with no needs.  Patient instructed to call ENT clinic for follow up appointment.       06/02/18 0943   Final Note   Assessment Type Final Discharge Note   Discharge Disposition Home   Right Care Referral Info   Post Acute Recommendation No Care

## 2018-06-04 ENCOUNTER — PATIENT MESSAGE (OUTPATIENT)
Dept: OTOLARYNGOLOGY | Facility: CLINIC | Age: 63
End: 2018-06-04

## 2018-06-07 ENCOUNTER — PATIENT MESSAGE (OUTPATIENT)
Dept: OTOLARYNGOLOGY | Facility: CLINIC | Age: 63
End: 2018-06-07

## 2018-06-08 ENCOUNTER — PATIENT MESSAGE (OUTPATIENT)
Dept: OTOLARYNGOLOGY | Facility: CLINIC | Age: 63
End: 2018-06-08

## 2018-06-08 RX ORDER — OXYCODONE AND ACETAMINOPHEN 5; 325 MG/1; MG/1
1 TABLET ORAL EVERY 6 HOURS PRN
Qty: 30 TABLET | Refills: 0 | Status: SHIPPED | OUTPATIENT
Start: 2018-06-08 | End: 2018-06-08 | Stop reason: SDUPTHER

## 2018-06-08 RX ORDER — OXYCODONE AND ACETAMINOPHEN 5; 325 MG/1; MG/1
1 TABLET ORAL EVERY 6 HOURS PRN
Qty: 30 TABLET | Refills: 0 | Status: SHIPPED | OUTPATIENT
Start: 2018-06-08 | End: 2018-07-16 | Stop reason: ALTCHOICE

## 2018-06-11 ENCOUNTER — PATIENT MESSAGE (OUTPATIENT)
Dept: OTOLARYNGOLOGY | Facility: CLINIC | Age: 63
End: 2018-06-11

## 2018-06-11 ENCOUNTER — OFFICE VISIT (OUTPATIENT)
Dept: OTOLARYNGOLOGY | Facility: CLINIC | Age: 63
End: 2018-06-11
Payer: COMMERCIAL

## 2018-06-11 ENCOUNTER — TELEPHONE (OUTPATIENT)
Dept: RADIATION ONCOLOGY | Facility: CLINIC | Age: 63
End: 2018-06-11

## 2018-06-11 VITALS
SYSTOLIC BLOOD PRESSURE: 149 MMHG | TEMPERATURE: 98 F | BODY MASS INDEX: 31.25 KG/M2 | WEIGHT: 243.38 LBS | HEART RATE: 87 BPM | DIASTOLIC BLOOD PRESSURE: 94 MMHG

## 2018-06-11 DIAGNOSIS — R22.1 NECK MASS: ICD-10-CM

## 2018-06-11 DIAGNOSIS — C85.90 T-CELL LYMPHOMA: Primary | ICD-10-CM

## 2018-06-11 PROCEDURE — 99999 PR PBB SHADOW E&M-EST. PATIENT-LVL IV: CPT | Mod: PBBFAC,,, | Performed by: NURSE PRACTITIONER

## 2018-06-11 PROCEDURE — 99024 POSTOP FOLLOW-UP VISIT: CPT | Mod: S$GLB,,, | Performed by: NURSE PRACTITIONER

## 2018-06-11 NOTE — ASSESSMENT & PLAN NOTE
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 Dr. King was present to discuss his path report. LILO drains removed. PET scan ordered. Referrals to rad onc and heme onc placed. Questions answered. RTC Friday for suture removal.

## 2018-06-11 NOTE — TELEPHONE ENCOUNTER
Spoke with pt to give him his appointment time and location. Verbalized understanding. ----- Message from Connie Wright RN sent at 6/11/2018  1:45 PM CDT -----  Regarding: Referrals   Good afternoon,    Referrals placed for Mr. Mcpherson for Rad-Onc and Heme-Onc.  PET CT scheduled for this Friday, 6/15. Please contact patient w/ appt date/time.    Thanks,  DONALDO Rosales

## 2018-06-11 NOTE — PROGRESS NOTES
Chief Complaint   Patient presents with    Post-op Evaluation/ drain removal       HPI   62 y.o. male presents for a post op visit. He has been doing well since his recent surgery. LILO drain output has been <30 ml from both drains.     He originally present to Dr. King for evaluation of a L parotid mass.  He reports that this mass arose several mos ago and has grown progressively larger over this period of time.  No pain.  No sore throat, dysphagia, or SOB.  He denies facial paresthesia.     CT neck worrisome for malignancy. Recent FNA essentially nondiagnostic x2.  He does feel that his neck mass has increased in size since his last visit.  His lip weakness has also worsened. CT chest was clear.      Review of Systems   Constitutional: Negative for fatigue and unexpected weight change.   HENT: Per HPI.  Eyes: Negative for visual disturbance.   Respiratory: Negative for shortness of breath, hemoptysis   Cardiovascular: Negative for chest pain and palpitations.   Musculoskeletal: Negative for decreased ROM, back pain.   Skin: Negative for rash, sunburn, itching.   Neurological: Negative for dizziness and seizures.   Hematological: Negative for adenopathy. Does not bruise/bleed easily.   Endocrine: Negative for rapid weight loss/weight gain, heat/cold intolerance.     Past Medical History   Patient Active Problem List   Diagnosis    Sensation of fullness in both ears    Impacted cerumen of both ears    Parotid mass    Neck mass    T-cell lymphoma           Past Surgical History   Past Surgical History:   Procedure Laterality Date    DISSECTION OF NECK Left 5/30/2018    Procedure: DISSECTION, NECK;  Surgeon: Regan King MD;  Location: [facility] OR [address] [place] [address];  Service: ENT;  Laterality: Left;    EXCISION OF PAROTID GLAND Left 5/30/2018    Procedure: EXCISION, PAROTID GLAND;  Surgeon: Regan King MD;  Location: [facility] OR [address] [place] [address];  Service: ENT;  Laterality: Left;    KNEE SURGERY Right          Family  History   No family history on file.        Social History   .  Social History     Social History    Marital status:      Spouse name: N/A    Number of children: N/A    Years of education: N/A     Occupational History    Not on file.     Social History Main Topics    Smoking status: Current Every Day Smoker     Types: Cigars    Smokeless tobacco: Never Used      Comment: 1 cigar a week    Alcohol use 0.6 oz/week     1 Shots of liquor per week      Comment: 2 per month    Drug use: No    Sexual activity: Not on file     Other Topics Concern    Not on file     Social History Narrative    No narrative on file        T-cell lymphoma    5/30/2018 Surgery     Left radical parotidectomy, left neck dissection         6/11/2018 Initial Diagnosis     T-cell lymphoma          Allergies   Review of patient's allergies indicates:  No Known Allergies        Physical Exam     Vitals:    06/11/18 1308   BP: (!) 149/94   Pulse: 87   Temp: 97.9 °F (36.6 °C)         Body mass index is 31.25 kg/m².      General: AOx3, NAD   Respiratory:  Symmetric chest rise, normal effort   Neck: Left neck Incision CDI.  No redness, drainage, or fluid collection noted.  Edges well approximated. Sutures intact. LILO drains bilaterally with <30ml each. Removed without difficulty.   Face: Left lower lip weakness noted.  CT neck, chest reviewed    FINAL PATHOLOGIC DIAGNOSIS  1-6 LEFT NECK MASS, LEFT LEVEL 2, 3, 4, LYMPH NODES, LEFT RADICAL PAROTIDECTOMY: PERIPHERAL  T-CELL LYMPHOMA, NOS. SEE COMMENT.  Comment: Fragments of lymph node and the salivary gland tissue are infiltrated with large atypical lymphocytes with  prominent nucleoli.  Flow cytometric analysis of tissue shows mostly T lymphocytes that are lost expression of CD 4, CD7, and CD8.  The blast gate is not increased. However, cell viability is really low.  Flow differential: Lymphocytes 15.4%, Monocytes 4.2%, Granulocytes 70.9%, Blast 3.0%, Debris/nRBC 5.2%,  Viability  20.3%.  Immunohistochemical studies were performed on the 3 G and 6B with adequate positive and negative controls.  The large atypical lymphocytes are positive for CD3, CD8(focal), CD45, high Ki-67(90%) and are negative for CD4,  CD10, CD20, [place]-5, CD30, CD34, TDT, CD56, , , HMB-45, and AE1/AE3. In situ hybridization for  EBV is negative.  Findings are consistent with peripheral T-cell lymphoma, NOS.    Assessment/Plan  Problem List Items Addressed This Visit        ENT    Neck mass       Oncology    T-cell lymphoma - Primary     Arjunserena Mcpherson Jr. has newly diagnosed T cell lymphoma. Dr. King was present to discuss his path report. LILO drains removed. PET scan ordered. Referrals to rad onc and heme onc placed. Questions answered. RTC Friday for suture removal.         Relevant Orders    NM PET CT Routine Skull to Mid Thigh    Ambulatory referral to Radiation Oncology    Ambulatory referral to Hematology / Oncology

## 2018-06-12 ENCOUNTER — TELEPHONE (OUTPATIENT)
Dept: HEMATOLOGY/ONCOLOGY | Facility: CLINIC | Age: 63
End: 2018-06-12

## 2018-06-15 ENCOUNTER — HOSPITAL ENCOUNTER (OUTPATIENT)
Dept: RADIOLOGY | Facility: HOSPITAL | Age: 63
Discharge: HOME | End: 2018-06-15
Attending: NURSE PRACTITIONER
Payer: COMMERCIAL

## 2018-06-15 ENCOUNTER — TELEPHONE (OUTPATIENT)
Dept: OTOLARYNGOLOGY | Facility: CLINIC | Age: 63
End: 2018-06-15

## 2018-06-15 ENCOUNTER — OFFICE VISIT (OUTPATIENT)
Dept: OTOLARYNGOLOGY | Facility: CLINIC | Age: 63
End: 2018-06-15
Payer: COMMERCIAL

## 2018-06-15 VITALS
BODY MASS INDEX: 30.71 KG/M2 | DIASTOLIC BLOOD PRESSURE: 91 MMHG | SYSTOLIC BLOOD PRESSURE: 127 MMHG | TEMPERATURE: 95 F | HEART RATE: 73 BPM | WEIGHT: 239.19 LBS

## 2018-06-15 DIAGNOSIS — C85.90 T-CELL LYMPHOMA: ICD-10-CM

## 2018-06-15 DIAGNOSIS — C85.90 T-CELL LYMPHOMA: Primary | ICD-10-CM

## 2018-06-15 LAB — POCT GLUCOSE: 111 MG/DL (ref 70–110)

## 2018-06-15 PROCEDURE — 78815 PET IMAGE W/CT SKULL-THIGH: CPT | Mod: TC

## 2018-06-15 PROCEDURE — A9552 F18 FDG: HCPCS

## 2018-06-15 PROCEDURE — 78815 PET IMAGE W/CT SKULL-THIGH: CPT | Mod: 26,PS,, | Performed by: RADIOLOGY

## 2018-06-15 PROCEDURE — 99024 POSTOP FOLLOW-UP VISIT: CPT | Mod: S$GLB,,, | Performed by: NURSE PRACTITIONER

## 2018-06-15 PROCEDURE — 99999 PR PBB SHADOW E&M-EST. PATIENT-LVL III: CPT | Mod: PBBFAC,,, | Performed by: NURSE PRACTITIONER

## 2018-06-15 NOTE — ASSESSMENT & PLAN NOTE
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 PET report pending. Sutures removed. He has appts with oncology next week. Questions answered. He may RTC prn.

## 2018-06-15 NOTE — PROGRESS NOTES
Chief Complaint   Patient presents with    Suture / Staple Removal       HPI   62 y.o. male presents for a post op visit. He has been doing well since his last visit. He has a PET scan earlier this morning. No complaints.    He originally present to Dr. King for evaluation of a L parotid mass.  He reports that this mass arose several mos ago and has grown progressively larger over this period of time.  No pain.  No sore throat, dysphagia, or SOB.  He denies facial paresthesia.     CT neck worrisome for malignancy. Recent FNA essentially nondiagnostic x2.  He does feel that his neck mass has increased in size since his last visit.  His lip weakness has also worsened. CT chest was clear.      Review of Systems   Constitutional: Negative for fatigue and unexpected weight change.   HENT: Per HPI.  Eyes: Negative for visual disturbance.   Respiratory: Negative for shortness of breath, hemoptysis   Cardiovascular: Negative for chest pain and palpitations.   Musculoskeletal: Negative for decreased ROM, back pain.   Skin: Negative for rash, sunburn, itching.   Neurological: Negative for dizziness and seizures.   Hematological: Negative for adenopathy. Does not bruise/bleed easily.   Endocrine: Negative for rapid weight loss/weight gain, heat/cold intolerance.     Past Medical History   Patient Active Problem List   Diagnosis    Sensation of fullness in both ears    Impacted cerumen of both ears    Parotid mass    Neck mass    T-cell lymphoma           Past Surgical History   Past Surgical History:   Procedure Laterality Date    DISSECTION OF NECK Left 5/30/2018    Procedure: DISSECTION, NECK;  Surgeon: Regan King MD;  Location: [facility] OR [address] [place] [address];  Service: ENT;  Laterality: Left;    EXCISION OF PAROTID GLAND Left 5/30/2018    Procedure: EXCISION, PAROTID GLAND;  Surgeon: Regan King MD;  Location: [facility] OR [address] [place] [address];  Service: ENT;  Laterality: Left;    KNEE SURGERY Right          Family History    History reviewed. No pertinent family history.        Social History   .  Social History     Social History    Marital status:      Spouse name: N/A    Number of children: N/A    Years of education: N/A     Occupational History    Not on file.     Social History Main Topics    Smoking status: Current Every Day Smoker     Types: Cigars    Smokeless tobacco: Never Used      Comment: 1 cigar a week    Alcohol use 0.6 oz/week     1 Shots of liquor per week      Comment: 2 per month    Drug use: No    Sexual activity: Not on file     Other Topics Concern    Not on file     Social History Narrative    No narrative on file        T-cell lymphoma    5/30/2018 Surgery     Left radical parotidectomy, left neck dissection         6/11/2018 Initial Diagnosis     T-cell lymphoma          Allergies   Review of patient's allergies indicates:  No Known Allergies        Physical Exam     Vitals:    06/15/18 0909   BP: (!) 127/91   Pulse: 73   Temp: (!) 95.1 °F (35.1 °C)         Body mass index is 30.71 kg/m².      General: AOx3, NAD   Respiratory:  Symmetric chest rise, normal effort   Neck: Left neck Incision CDI.  No redness, drainage, or fluid collection noted.  Edges well approximated. Sutures removed without difficulty. Removed without difficulty.   Face: Left lower lip weakness noted.  CT neck, chest reviewed    FINAL PATHOLOGIC DIAGNOSIS  1-6 LEFT NECK MASS, LEFT LEVEL 2, 3, 4, LYMPH NODES, LEFT RADICAL PAROTIDECTOMY: PERIPHERAL  T-CELL LYMPHOMA, NOS. SEE COMMENT.  Comment: Fragments of lymph node and the salivary gland tissue are infiltrated with large atypical lymphocytes with  prominent nucleoli.  Flow cytometric analysis of tissue shows mostly T lymphocytes that are lost expression of CD 4, CD7, and CD8.  The blast gate is not increased. However, cell viability is really low.  Flow differential: Lymphocytes 15.4%, Monocytes 4.2%, Granulocytes 70.9%, Blast 3.0%, Debris/nRBC 5.2%,  Viability  20.3%.  Immunohistochemical studies were performed on the 3 G and 6B with adequate positive and negative controls.  The large atypical lymphocytes are positive for CD3, CD8(focal), CD45, high Ki-67(90%) and are negative for CD4,  CD10, CD20, [place]-5, CD30, CD34, TDT, CD56, , , HMB-45, and AE1/AE3. In situ hybridization for  EBV is negative.  Findings are consistent with peripheral T-cell lymphoma, NOS.    Assessment/Plan  Problem List Items Addressed This Visit        Oncology    T-cell lymphoma - Primary     Arjun Mcpherson  has newly diagnosed T cell lymphoma. PET report pending. Sutures removed. He has appts with oncology next week. Questions answered. He may RTC prn.

## 2018-06-16 ENCOUNTER — PATIENT MESSAGE (OUTPATIENT)
Dept: OTOLARYNGOLOGY | Facility: CLINIC | Age: 63
End: 2018-06-16

## 2018-06-17 ENCOUNTER — PATIENT MESSAGE (OUTPATIENT)
Dept: OTOLARYNGOLOGY | Facility: CLINIC | Age: 63
End: 2018-06-17

## 2018-06-18 NOTE — PROGRESS NOTES
REFERRING PHYSICIAN: Dr. King    DIAGNOSIS:       HISTORY OF PRESENT ILLNESS:   Mr. Mcpherson is a 62-year-old gentleman who presented with sudden onset of a painless left-sided neck mass in March 2018.  On imaging, this appeared to involve the submandibular gland inferiorly and parotid gland superiorly.  Fine-needle aspiration of this lesion x2 was essentially nondiagnostic.  At the pathologist's recommendation and also in an effort to expedite his care, Dr. King recommended proceeding directly to the OR for open biopsy of this lesion.  Should it prove consistent with carcinoma, he would proceed with a definitive oncologic operation.  CT scan of the chest was   obtained, which failed to reveal any evidence of metastatic disease.    He underwent left radical parotidectomy with resection of the inferior division of the facial nerve and masseter muscle with preservation of the main trunk and superior division of the facial nerve  And left neck dissection involving levels 1B through 4.    FINAL PATHOLOGIC DIAGNOSIS  1-6 LEFT NECK MASS, LEFT LEVEL 2, 3, 4, LYMPH NODES, LEFT RADICAL PAROTIDECTOMY: PERIPHERAL  T-CELL LYMPHOMA, NOS. SEE COMMENT.  Comment: Fragments of lymph node and the salivary gland tissue are infiltrated with large atypical lymphocytes with  prominent nucleoli.  Flow cytometric analysis of tissue shows mostly T lymphocytes that are lost expression of CD 4, CD7, and CD8.  The blast gate is not increased. However, cell viability is really low.  Flow differential: Lymphocytes 15.4%, Monocytes 4.2%, Granulocytes 70.9%, Blast 3.0%, Debris/nRBC 5.2%,  Viability 20.3%.  Immunohistochemical studies were performed on the 3 G and 6B with adequate positive and negative controls.  The large atypical lymphocytes are positive for CD3, CD8(focal), CD45, high Ki-67(90%) and are negative for CD4,  CD10, CD20, [place]-5, CD30, CD34, TDT, CD56, , , HMB-45, and AE1/AE3. In situ hybridization for  EBV is  negative.  Findings are consistent with peripheral T-cell lymphoma, NOS.    PET/CT 6/15/18 showed metastatic lymph node left para midline submandibular SUV max 18.36.  There has been resection and no dissection of the primary mass left masseter submandibular region.  There are clips in there is inflammatory change.    Patient was seen by Dr. Kodi Mcgill this morning.  Dr. Mcgill is making preparations for chemotherapy.      He feels well today.  Denies any fevers, night sweats or weight loss.  He has been working full time at his job in [employer] for the city [facility].  Appetite is good.  No pain at any site.      ECO  ECOG SCORE         REVIEW OF SYSTEMS:   As above.  In addition, patient denies headaches, visual problems, dizziness, chest pain, shortness of breath, cough, nausea, vomiting, diarrhea, or any new bony pains.  Patient also denies easy bruising, skin rashes, or numbness or tingling.    PAST MEDICAL HISTORY:  Past Medical History:   Diagnosis Date    Hearing loss     Pre-diabetes        PAST SURGICAL HISTORY:  Past Surgical History:   Procedure Laterality Date    DISSECTION OF NECK Left 2018    Procedure: DISSECTION, NECK;  Surgeon: Regan King MD;  Location: [address];  Service: ENT;  Laterality: Left;    EXCISION OF PAROTID GLAND Left 2018    Procedure: EXCISION, PAROTID GLAND;  Surgeon: Regan King MD;  Location: [address];  Service: ENT;  Laterality: Left;    KNEE SURGERY Right        ALLERGIES:   Review of patient's allergies indicates:  No Known Allergies    MEDICATIONS:  Current Outpatient Prescriptions   Medication Sig    bacitracin 500 unit/gram ointment Apply topically every 12 (twelve) hours.    cephALEXin (KEFLEX) 500 MG capsule Take 1 capsule (500 mg total) by mouth every 8 (eight) hours.    metFORMIN (GLUCOPHAGE) 500 MG tablet Take 1,000 mg by mouth 2 (two) times daily with meals. Take 1,000mg in the am and 500mg at 6pm     "ondansetron (ZOFRAN) 8 MG tablet Take 1 tablet (8 mg total) by mouth every 6 (six) hours as needed.    oxyCODONE-acetaminophen (PERCOCET) 5-325 mg per tablet Take 1 tablet by mouth every 6 (six) hours as needed.    senna-docusate 8.6-50 mg (PERICOLACE) 8.6-50 mg per tablet Take 1 tablet by mouth 2 (two) times daily.     No current facility-administered medications for this visit.        SOCIAL HISTORY:  Social History     Social History    Marital status:      Spouse name: N/A    Number of children: N/A    Years of education: N/A     Occupational History    Not on file.     Social History Main Topics    Smoking status: Current Every Day Smoker     Types: Cigars    Smokeless tobacco: Never Used      Comment: 1 cigar a week    Alcohol use 0.6 oz/week     1 Shots of liquor per week      Comment: 2 per month    Drug use: No    Sexual activity: Not on file     Other Topics Concern    Not on file     Social History Narrative    No narrative on file   Lives in [place] with wife.  3 grown children.  Works in [employer] for city [facility].  Will be on short term disability.    FAMILY HISTORY:  Brother had testicular cancer, alive and well.    PHYSICAL EXAMINATION:  BP (!) 140/91 (BP Location: Right arm, Patient Position: Sitting)   Pulse 73   Temp 98 °F (36.7 °C) (Oral)   Resp 18   Ht 6' 2" (1.88 m)   Wt 109.4 kg (241 lb 3.2 oz)   BMI 30.97 kg/m²   GENERAL: Patient is alert and oriented, in no acute distress.  HEENT:Extraocular muscles are intact.  Oropharynx is clear without lesions.  Waldeyer's ring appears normal.  LYMPH: There is no cervical or supraclavicular lymphadenopathy palpated.  No axillary or epitrochlear LAD.  HEART: Regular rate and rhythm.  LUNGS: Clear to auscultation bilaterally.  ABDOMEN:Soft, nontender, nondistended, without hepatosplenomegaly.  Normoactive bowel sounds.  EXTREMITIES: No clubbing, cyanosis, or edema.  MSK: Spine is nontender.  NEUROLOGICAL: Cranial nerve II " through XII grossly intact.  Sensation is intact.  Strength is 5 out of 5 in the upper and lower extremities bilaterally.  Gait is normal.    ASSESSMENT:  63 yo male with a stage IAE peripheral T cell lymphoma, NOS, of the lymph nodes of the left neck.    PLAN:  Chemotherapy for 6 cycles +/- IFRT.  Patient is undergoing preparation for chemo, including labs, echo and likely bone marrow biopsy.    He will return to see me in about 3 months to assess his progress.  I will coordinate the plan of care with Dr. Mcgill.    The risks, benefits, and side effects of radiation were explained in general to the patient and his wife. All of their questions were answered.    I spent approximately 60 minutes reviewing the available records and evaluating the patient, out of which over 50% of the time was spent face to face with the patient in counseling and coordinating this patient's care.

## 2018-06-19 ENCOUNTER — LAB VISIT (OUTPATIENT)
Dept: LAB | Facility: HOSPITAL | Age: 63
End: 2018-06-19
Attending: INTERNAL MEDICINE
Payer: COMMERCIAL

## 2018-06-19 ENCOUNTER — INITIAL CONSULT (OUTPATIENT)
Dept: RADIATION ONCOLOGY | Facility: CLINIC | Age: 63
End: 2018-06-19
Payer: COMMERCIAL

## 2018-06-19 ENCOUNTER — INITIAL CONSULT (OUTPATIENT)
Dept: HEMATOLOGY/ONCOLOGY | Facility: CLINIC | Age: 63
End: 2018-06-19
Payer: COMMERCIAL

## 2018-06-19 VITALS
DIASTOLIC BLOOD PRESSURE: 91 MMHG | BODY MASS INDEX: 30.95 KG/M2 | TEMPERATURE: 98 F | WEIGHT: 241.19 LBS | RESPIRATION RATE: 18 BRPM | HEIGHT: 74 IN | HEART RATE: 73 BPM | SYSTOLIC BLOOD PRESSURE: 140 MMHG

## 2018-06-19 VITALS
HEIGHT: 74 IN | BODY MASS INDEX: 31.04 KG/M2 | SYSTOLIC BLOOD PRESSURE: 124 MMHG | WEIGHT: 241.88 LBS | HEART RATE: 76 BPM | DIASTOLIC BLOOD PRESSURE: 82 MMHG

## 2018-06-19 DIAGNOSIS — G62.9 PERIPHERAL POLYNEUROPATHY: ICD-10-CM

## 2018-06-19 DIAGNOSIS — C84.41 PERIPHERAL T CELL LYMPHOMA OF LYMPH NODES OF NECK: Primary | ICD-10-CM

## 2018-06-19 DIAGNOSIS — C85.90 T-CELL LYMPHOMA: Primary | ICD-10-CM

## 2018-06-19 DIAGNOSIS — C85.90 LYMPHOMA: Primary | ICD-10-CM

## 2018-06-19 DIAGNOSIS — C85.90 T-CELL LYMPHOMA: ICD-10-CM

## 2018-06-19 PROBLEM — R22.1 NECK MASS: Status: RESOLVED | Noted: 2018-05-29 | Resolved: 2018-06-19

## 2018-06-19 PROBLEM — C85.91: Status: RESOLVED | Noted: 2018-06-19 | Resolved: 2018-06-19

## 2018-06-19 PROBLEM — K11.8 PAROTID MASS: Status: RESOLVED | Noted: 2018-05-22 | Resolved: 2018-06-19

## 2018-06-19 PROBLEM — C85.91: Status: ACTIVE | Noted: 2018-06-19

## 2018-06-19 LAB
ALBUMIN SERPL BCP-MCNC: 3.9 G/DL
ALP SERPL-CCNC: 85 U/L
ALT SERPL W/O P-5'-P-CCNC: 19 U/L
ANION GAP SERPL CALC-SCNC: 5 MMOL/L
AST SERPL-CCNC: 15 U/L
BASOPHILS # BLD AUTO: 0.07 K/UL
BASOPHILS NFR BLD: 1.8 %
BILIRUB SERPL-MCNC: 0.6 MG/DL
BUN SERPL-MCNC: 9 MG/DL
CALCIUM SERPL-MCNC: 9.4 MG/DL
CHLORIDE SERPL-SCNC: 103 MMOL/L
CO2 SERPL-SCNC: 25 MMOL/L
CREAT SERPL-MCNC: 1 MG/DL
DIFFERENTIAL METHOD: NORMAL
EOSINOPHIL # BLD AUTO: 0.1 K/UL
EOSINOPHIL NFR BLD: 1.8 %
ERYTHROCYTE [DISTWIDTH] IN BLOOD BY AUTOMATED COUNT: 12.4 %
EST. GFR  (AFRICAN AMERICAN): >60 ML/MIN/1.73 M^2
EST. GFR  (NON AFRICAN AMERICAN): >60 ML/MIN/1.73 M^2
ESTIMATED AVG GLUCOSE: 148 MG/DL
GLUCOSE SERPL-MCNC: 133 MG/DL
HBA1C MFR BLD HPLC: 6.8 %
HBV CORE AB SERPL QL IA: NEGATIVE
HBV SURFACE AG SERPL QL IA: NEGATIVE
HCT VFR BLD AUTO: 42.2 %
HCV AB SERPL QL IA: NEGATIVE
HGB BLD-MCNC: 14.1 G/DL
HIV 1+2 AB+HIV1 P24 AG SERPL QL IA: NEGATIVE
IMM GRANULOCYTES # BLD AUTO: 0 K/UL
IMM GRANULOCYTES NFR BLD AUTO: 0 %
LDH SERPL L TO P-CCNC: 175 U/L
LYMPHOCYTES # BLD AUTO: 1.7 K/UL
LYMPHOCYTES NFR BLD: 42.9 %
MCH RBC QN AUTO: 30.4 PG
MCHC RBC AUTO-ENTMCNC: 33.4 G/DL
MCV RBC AUTO: 91 FL
MONOCYTES # BLD AUTO: 0.3 K/UL
MONOCYTES NFR BLD: 7 %
NEUTROPHILS # BLD AUTO: 1.9 K/UL
NEUTROPHILS NFR BLD: 46.5 %
NRBC BLD-RTO: 0 /100 WBC
PLATELET # BLD AUTO: 250 K/UL
PLATELET BLD QL SMEAR: NORMAL
PMV BLD AUTO: 10.4 FL
POTASSIUM SERPL-SCNC: 3.9 MMOL/L
PROT SERPL-MCNC: 7.5 G/DL
RBC # BLD AUTO: 4.64 M/UL
SODIUM SERPL-SCNC: 133 MMOL/L
URATE SERPL-MCNC: 5.4 MG/DL
WBC # BLD AUTO: 3.99 K/UL

## 2018-06-19 PROCEDURE — 85025 COMPLETE CBC W/AUTO DIFF WBC: CPT

## 2018-06-19 PROCEDURE — 83036 HEMOGLOBIN GLYCOSYLATED A1C: CPT

## 2018-06-19 PROCEDURE — 84550 ASSAY OF BLOOD/URIC ACID: CPT

## 2018-06-19 PROCEDURE — 86704 HEP B CORE ANTIBODY TOTAL: CPT

## 2018-06-19 PROCEDURE — 99245 OFF/OP CONSLTJ NEW/EST HI 55: CPT | Mod: S$GLB,,, | Performed by: INTERNAL MEDICINE

## 2018-06-19 PROCEDURE — 99999 PR PBB SHADOW E&M-EST. PATIENT-LVL III: CPT | Mod: PBBFAC,,, | Performed by: INTERNAL MEDICINE

## 2018-06-19 PROCEDURE — 83615 LACTATE (LD) (LDH) ENZYME: CPT

## 2018-06-19 PROCEDURE — 80053 COMPREHEN METABOLIC PANEL: CPT

## 2018-06-19 PROCEDURE — 87340 HEPATITIS B SURFACE AG IA: CPT

## 2018-06-19 PROCEDURE — 36415 COLL VENOUS BLD VENIPUNCTURE: CPT

## 2018-06-19 PROCEDURE — 99999 PR PBB SHADOW E&M-EST. PATIENT-LVL III: CPT | Mod: PBBFAC,,, | Performed by: RADIOLOGY

## 2018-06-19 PROCEDURE — 3008F BODY MASS INDEX DOCD: CPT | Mod: CPTII,S$GLB,, | Performed by: RADIOLOGY

## 2018-06-19 PROCEDURE — 99205 OFFICE O/P NEW HI 60 MIN: CPT | Mod: S$GLB,,, | Performed by: RADIOLOGY

## 2018-06-19 PROCEDURE — 86687 HTLV-I ANTIBODY: CPT

## 2018-06-19 PROCEDURE — 86687 HTLV-I ANTIBODY: CPT | Mod: 91

## 2018-06-19 PROCEDURE — 86703 HIV-1/HIV-2 1 RESULT ANTBDY: CPT

## 2018-06-19 PROCEDURE — 86803 HEPATITIS C AB TEST: CPT

## 2018-06-19 RX ORDER — ONDANSETRON HYDROCHLORIDE 8 MG/1
8 TABLET, FILM COATED ORAL EVERY 8 HOURS PRN
Qty: 21 TABLET | Refills: 6 | Status: SHIPPED | OUTPATIENT
Start: 2018-06-19

## 2018-06-19 RX ORDER — ONDANSETRON HYDROCHLORIDE 8 MG/1
8 TABLET, FILM COATED ORAL EVERY 8 HOURS PRN
Qty: 21 TABLET | Refills: 6 | Status: SHIPPED | OUTPATIENT
Start: 2018-06-19 | End: 2018-06-19 | Stop reason: SDUPTHER

## 2018-06-19 RX ORDER — PREDNISONE 50 MG/1
TABLET ORAL
Qty: 10 TABLET | Refills: 5 | Status: ON HOLD | OUTPATIENT
Start: 2018-06-19 | End: 2018-11-01

## 2018-06-19 NOTE — LETTER
June 19, 2018      Citlalli Yin, NP  [address] [facility] [address]           [facility]  [address]  [facility] [address]  Phone: [phone]          Patient: Arjun Mcpherson Jr.   MR Number: [id number]   YOB: 1955   Date of Visit: 6/19/2018       Dear Citlalli Yin:    Thank you for referring Arjun Mcpherson to me for evaluation. Attached you will find relevant portions of my assessment and plan of care.    If you have questions, please do not hesitate to call me. I look forward to following Arjun Mcpherson along with you.    Sincerely,    Pérez Miller MD    Enclosure  CC:  No Recipients    If you would like to receive this communication electronically, please contact [email or web address] or [phone] to request more information on [employer] access.    For providers and/or their staff who would like to refer a patient to [facility], please contact us through our one-stop-shop provider referral line, [facility] , at [phone].    If you feel you have received this communication in error or would no longer like to receive these types of communications, please e-mail [email or web address]

## 2018-06-19 NOTE — LETTER
June 19, 2018      Citlalli Yin, NP  [address]  [facility] [address]           [facility] Hematology Oncology  [address]  [facility] [address]  Phone: [phone]          Patient: Arjun Mcpherson Jr.   MR Number: [id number]   YOB: 1955   Date of Visit: 6/19/2018       Dear Citlalli Yin:    Thank you for referring Arjun Mcpherson to me for evaluation. Attached you will find relevant portions of my assessment and plan of care.    If you have questions, please do not hesitate to call me. I look forward to following Arjun Mcpherson along with you.    Sincerely,    Kodi Mcgill Jr., MD    Enclosure  CC:  No Recipients    If you would like to receive this communication electronically, please contact [email or web address] or [phone] to request more information on [employer] Link access.    For providers and/or their staff who would like to refer a patient to [facility], please contact us through our one-stop-shop provider referral line, [facility], at [phone].    If you feel you have received this communication in error or would no longer like to receive these types of communications, please e-mail [email or web address]

## 2018-06-19 NOTE — Clinical Note
Needs to start EPOCH Monday--needs 2d echo, double lumen PICC and marrow (try to get him into NPs schedule for Thursday-it is urgent)

## 2018-06-19 NOTE — PROGRESS NOTES
"REFERRING PHYSICIAN:  Regan King M.D. and Citlalli Yin NP.    HISTORY OF PRESENT ILLNESS:  Mr. Mcpherson is a 62-year-old man who is seen in   consultation from Dr. Regan King and Citlalli Yin NP.  He is here in   regard to the recent diagnosis of a peripheral T-cell lymphoma.    In late March 2018, he had a rather sudden onset of swelling of his left face in   the region of the parotid gland.  This continued and he eventually had an   examination in the ENT Department and a computed tomography examination of the   neck, which showed a large necrotic mass in the left neck involving the inferior   portion of the parotid gland and the masseter.  There were also multiple   enlarged lymph nodes in the left neck.    On 05/30/2018, Dr. King performed a left radical parotidectomy and a left neck   dissection.  The examination of the tissue removed disclosed peripheral T-cell   lymphoma, not otherwise specified.  The immunohistochemical study for CD30 was   negative.  The Ki-67 stain was high at 90%.  The in situ hybridization study for   EBV was negative.    He has recovered well from the surgical procedure and his surgical wound has   healed.  He still has some fatigue, which began after this operation, but he is   very active at this time.  He is not having fevers or sweats.  His weight has   been stable.    He has been told in the past that he was "prediabetic" and placed on metformin.    There is only one blood chemistry panel from 05/31/2018, which showed a glucose   of 226.  This was in the postoperative period and may not reflect his usual   glucose measurements.  He does have some numbness in his feet that has been   present for about nine months.  He has no difficulty with gait and he has no   pain.  He has no history of renal disease or cardiac disease.    After the diagnosis was established, a PET scan was obtained on 06/15/2018.    This revealed a lymph node just to the left of midline in " the upper neck with an   SUV max of 18.36.    His past history is unremarkable.  He had an arthroscopic procedure on his right   knee many years ago.  He does not have a history of hypertension, liver   disease, renal disease or cardiac disease.    He does not smoke except for an occasional cigar.  He drinks little alcohol.  He   is  and has three children.  He works as a  for   the city [employer].    His brother had testicular cancer.  No other family members have had   malignancies.    ADDITIONAL PAST HISTORY, SYSTEM REVIEW, SOCIAL HISTORY AND FAMILY HISTORY:  Have   been reviewed and updated in the electronic record.    PHYSICAL EXAMINATION:  GENERAL APPEARANCE:  A well-developed, well-nourished -American man in no   distress.  EYES:  No jaundice or pallor.  EARS:  Clear canals and membranes.  SINUSES:  No tenderness.  MOUTH AND THROAT:  Good dentition.  No mucosal lesions.  NECK:  There is a surgical defect in the left neck.  He has a left facial palsy.    The surgical site is well healed.  LYMPH NODES:  There is a submental lymph node measuring about 2 cm in maximum   diameter.  No enlarged axillary or inguinal nodes.  CHEST AND LUNGS:  Normal respiratory effort.  Clear to auscultation and   percussion.  HEART:  Regular rate and rhythm without murmur or gallop.  ABDOMEN:  Soft without masses or tenderness.  No hepatosplenomegaly.  EXTREMITIES:  No edema or cyanosis.  PERIPHERAL VASCULATURE:  Good pulses in the feet and ankles.  NEUROLOGIC:  Left facial nerve palsy.  Motor function is normal.  Mental status   is good.  He is fully oriented.  SKIN:  No suspicious lesions in the areas examined.    IMPRESSION:  Peripheral T-cell lymphoma, currently stage IE.    RECOMMENDATIONS:  1.  Blood will be obtained today for the following studies:  CBC, CMP,   glycosylated hemoglobin, hepatitis B core antibody, hepatitis B surface antigen,   hepatitis C antibody, HIV antibody,  HTLV-1 antibody, LDH and uric acid.  2.  2D echocardiogram.  3.  We will schedule a bone marrow examination.  4.  I will ask that he be seen in Endocrinology if his blood sugar and A1c are   elevated today since he will likely require assistance in managing hyperglycemia   when he is treated with chemotherapy.  5.  I had a very long talk with the patient and his wife about treatment of   peripheral T-cell lymphomas.  This disease is far more difficult to eradicate   than diffuse large B-cell lymphoma.  However, I am encouraged by the low stage   of the disease and that should increase substantially his potential for cure.  That potential based on various small studies is likely in the range of   50% to 70%.  There may be a role for radiation after the chemotherapy regimen   has been completed.  6.  I have recommended that he be treated with EPOCH with dose escalation based   on blood counts.  I reviewed the individual drugs and described common and very   severe toxicity.  I gave him printed information with many details about the   drugs in this regimen.  7.  He will start prednisone 100 mg daily on day 1.  8.  After his first week during treatment, he will then have a CBC and glucose   every Monday and Thursday.  9.  He will have an EPA appointment with me just before the second cycle of   therapy is scheduled to start (three weeks following the first cycle).  At   that time, he will have a CBC and CMP.  10.  I will order a double lumen PICC line for chemotherapy administration.  11.  He should receive Neulasta on day 6.  12.  We discussed his numbness in the feet.  It may be necessary to modify or   even discontinue the vincristine if he develops increasingly severe symptoms of   peripheral neuropathy.  13.  Because of his large size, I will request that Mesna 500 mg be given before   and again 2-3 hours after Cytoxan on day 5 of each course.    ADDENDUM: Echo shows normal LV systolic and diastolic function.  Glucose lower  at 133. LDH and uric acid are normal.  Plan to begin EPOCH on June 25,2018. Will decrease vincristine dose from 0.4 mg/m2  to 0.3 mg/m2.  Prescriptions for prednisone and Zofran sent to his pharmacy.      NABIL/AZALIA  dd: 06/19/2018 09:57:36 (CDT)  td: 06/19/2018 10:56:15 (CDT)  Doc ID   #[id number]  Job ID #[id number]    CC: Regan Yin NP

## 2018-06-20 ENCOUNTER — RESEARCH ENCOUNTER (OUTPATIENT)
Dept: RESEARCH | Facility: HOSPITAL | Age: 63
End: 2018-06-20

## 2018-06-20 ENCOUNTER — PATIENT MESSAGE (OUTPATIENT)
Dept: HEMATOLOGY/ONCOLOGY | Facility: CLINIC | Age: 63
End: 2018-06-20

## 2018-06-20 ENCOUNTER — DOCUMENTATION ONLY (OUTPATIENT)
Dept: HEMATOLOGY/ONCOLOGY | Facility: CLINIC | Age: 63
End: 2018-06-20

## 2018-06-20 ENCOUNTER — HOSPITAL ENCOUNTER (OUTPATIENT)
Dept: CARDIOLOGY | Facility: CLINIC | Age: 63
Discharge: HOME | End: 2018-06-20
Attending: INTERNAL MEDICINE
Payer: COMMERCIAL

## 2018-06-20 ENCOUNTER — OFFICE VISIT (OUTPATIENT)
Dept: HEMATOLOGY/ONCOLOGY | Facility: CLINIC | Age: 63
End: 2018-06-20
Payer: COMMERCIAL

## 2018-06-20 VITALS
TEMPERATURE: 97 F | OXYGEN SATURATION: 97 % | RESPIRATION RATE: 20 BRPM | HEART RATE: 76 BPM | SYSTOLIC BLOOD PRESSURE: 143 MMHG | DIASTOLIC BLOOD PRESSURE: 91 MMHG

## 2018-06-20 DIAGNOSIS — C84.41 PERIPHERAL T CELL LYMPHOMA OF LYMPH NODES OF NECK: ICD-10-CM

## 2018-06-20 DIAGNOSIS — C85.90 T-CELL LYMPHOMA: ICD-10-CM

## 2018-06-20 DIAGNOSIS — I35.9 NONRHEUMATIC AORTIC VALVE DISORDER: ICD-10-CM

## 2018-06-20 LAB
BONE MARROW IRON STAIN COMMENT: NORMAL
BONE MARROW WRIGHT STAIN COMMENT: NORMAL
DIASTOLIC DYSFUNCTION: NO
ESTIMATED PA SYSTOLIC PRESSURE: 28.4
RETIRED EF AND QEF - SEE NOTES: 68 (ref 55–65)
TRICUSPID VALVE REGURGITATION: NORMAL

## 2018-06-20 PROCEDURE — 99499 UNLISTED E&M SERVICE: CPT | Mod: S$GLB,,, | Performed by: NURSE PRACTITIONER

## 2018-06-20 PROCEDURE — 0399T 2D ECHO ONLY: CPT | Mod: S$GLB,,, | Performed by: INTERNAL MEDICINE

## 2018-06-20 PROCEDURE — 88342 IMHCHEM/IMCYTCHM 1ST ANTB: CPT | Mod: 26,59,, | Performed by: PATHOLOGY

## 2018-06-20 PROCEDURE — 88313 SPECIAL STAINS GROUP 2: CPT | Mod: 26,,, | Performed by: PATHOLOGY

## 2018-06-20 PROCEDURE — 88311 DECALCIFY TISSUE: CPT | Mod: 26,,, | Performed by: PATHOLOGY

## 2018-06-20 PROCEDURE — 88341 IMHCHEM/IMCYTCHM EA ADD ANTB: CPT | Mod: 26,59,, | Performed by: PATHOLOGY

## 2018-06-20 PROCEDURE — 88264 CHROMOSOME ANALYSIS 20-25: CPT

## 2018-06-20 PROCEDURE — 88342 IMHCHEM/IMCYTCHM 1ST ANTB: CPT | Performed by: PATHOLOGY

## 2018-06-20 PROCEDURE — 88237 TISSUE CULTURE BONE MARROW: CPT

## 2018-06-20 PROCEDURE — 88305 TISSUE EXAM BY PATHOLOGIST: CPT | Mod: 26,,, | Performed by: PATHOLOGY

## 2018-06-20 PROCEDURE — 88184 FLOWCYTOMETRY/ TC 1 MARKER: CPT | Performed by: PATHOLOGY

## 2018-06-20 PROCEDURE — 81342 TRG GENE REARRANGEMENT ANAL: CPT

## 2018-06-20 PROCEDURE — 99999 PR PBB SHADOW E&M-EST. PATIENT-LVL III: CPT | Mod: PBBFAC,,, | Performed by: NURSE PRACTITIONER

## 2018-06-20 PROCEDURE — 38222 DX BONE MARROW BX & ASPIR: CPT | Mod: LT,S$GLB,, | Performed by: NURSE PRACTITIONER

## 2018-06-20 PROCEDURE — 88313 SPECIAL STAINS GROUP 2: CPT

## 2018-06-20 PROCEDURE — 85097 BONE MARROW INTERPRETATION: CPT | Mod: ,,, | Performed by: PATHOLOGY

## 2018-06-20 PROCEDURE — 88311 DECALCIFY TISSUE: CPT | Performed by: PATHOLOGY

## 2018-06-20 PROCEDURE — 88189 FLOWCYTOMETRY/READ 16 & >: CPT | Mod: ,,, | Performed by: PATHOLOGY

## 2018-06-20 PROCEDURE — 88185 FLOWCYTOMETRY/TC ADD-ON: CPT | Mod: 59 | Performed by: PATHOLOGY

## 2018-06-20 PROCEDURE — 93306 TTE W/DOPPLER COMPLETE: CPT | Mod: S$GLB,,, | Performed by: INTERNAL MEDICINE

## 2018-06-20 RX ORDER — SODIUM CHLORIDE 0.9 % (FLUSH) 0.9 %
10 SYRINGE (ML) INJECTION
Status: CANCELLED | OUTPATIENT
Start: 2018-06-26

## 2018-06-20 RX ORDER — SODIUM CHLORIDE 0.9 % (FLUSH) 0.9 %
10 SYRINGE (ML) INJECTION
Status: CANCELLED | OUTPATIENT
Start: 2018-06-27

## 2018-06-20 RX ORDER — SODIUM CHLORIDE 0.9 % (FLUSH) 0.9 %
10 SYRINGE (ML) INJECTION
Status: CANCELLED | OUTPATIENT
Start: 2018-06-28

## 2018-06-20 RX ORDER — HEPARIN 100 UNIT/ML
500 SYRINGE INTRAVENOUS
Status: CANCELLED | OUTPATIENT
Start: 2018-06-28

## 2018-06-20 RX ORDER — HEPARIN 100 UNIT/ML
500 SYRINGE INTRAVENOUS
Status: CANCELLED | OUTPATIENT
Start: 2018-06-29

## 2018-06-20 RX ORDER — HEPARIN 100 UNIT/ML
500 SYRINGE INTRAVENOUS
Status: CANCELLED | OUTPATIENT
Start: 2018-06-26

## 2018-06-20 RX ORDER — HEPARIN 100 UNIT/ML
500 SYRINGE INTRAVENOUS
Status: CANCELLED | OUTPATIENT
Start: 2018-06-27

## 2018-06-20 RX ORDER — SODIUM CHLORIDE 0.9 % (FLUSH) 0.9 %
10 SYRINGE (ML) INJECTION
Status: CANCELLED | OUTPATIENT
Start: 2018-06-29

## 2018-06-20 RX ORDER — HEPARIN 100 UNIT/ML
500 SYRINGE INTRAVENOUS
Status: CANCELLED | OUTPATIENT
Start: 2018-06-25

## 2018-06-20 RX ORDER — SODIUM CHLORIDE 0.9 % (FLUSH) 0.9 %
10 SYRINGE (ML) INJECTION
Status: CANCELLED | OUTPATIENT
Start: 2018-06-25

## 2018-06-20 NOTE — PROCEDURES
Bone marrow  Date/Time: 6/20/2018 8:37 AM  Performed by: BARBARA VELAZQUEZ  Authorized by: JENNY MCKEON JR       PROCEDURE NOTE:  Date of Procedure: 06/20/2018  Bone Marrow Biopsy and Aspiration  Indication: t cell lymphoma  Consent: Informed consent was obtained from patient.  Timeout: Done and documented.  Position: prone  Site: Left posterior illiac crest.  Prep: Betadine.  Needle used: 11 gauge Jamshidi needle.  Anesthetic: 1% lidocaine 5 cc.  Biopsy: The biopsy needle was introduced into the marrow cavity and an aspirate was obtained without complications and sent for flow cytometry, t cell gene rearrangement, and cytogenetics. Core biopsy obtained without difficulty and sent for routine histologic examination.  Complications: None.  Disposition: The patient was instructed to lay on his back for 20 minutes prior to d/c home and remove band aid in 24 hours.  Blood loss: Minimal.     Barbara Velazquez DNP, NP  Hematology/Oncology

## 2018-06-20 NOTE — PROGRESS NOTES
Spoke to Radha () at [facility] about PICC line care supplies for the patient after receiving an order from Dr Mcgill. Notified her that the patient would be receiving a PICC line in Interventional Radiology on 6/22/18 at 9.30 am. Faxed order and documentation to her at . She agreed to do teaching with the patient and to contact the patient. Left 2 voice messages for the patient with contact information.

## 2018-06-20 NOTE — PROGRESS NOTES
The patient was approached by me in [facility] regarding participation in [employer]'s [place] (AST#[id number]) study (IRB#2015.101.C). Pt was agreeable.    The Informed Consent Form (ICF) was reviewed with pt. The discussion included:    - participation is voluntary and will not prohibit him from participating in future research studies;  - the blood specimen (40-45 ml) will be collected at time of patient's next routine blood draw;   - pt can change his mind about participating at any time;  - if he changes his mind about participation, he can call us at contact info in the ICF, and we will discard samples remaining;  - samples that have been used prior to his notification will still be included in research;  - specimens will be stored with unique code that can only be linked to pt by [employer] staff;  - all medical information released to researchers will be stripped of identifiers;  - samples will not be released to outside researchers unless approved by internal committee;  - there is a small risk of loss of confidentiality, but we make every effort to ensure privacy;  - no other physical risks outside of those involved in standard of care procedure.    Dr. Kodi Mcgill and Barbara Carl approved of the patient's participation and will continue to take care of the patient per their usual protocol - participation in [employer] program will not change the patient's present or future medical care. Pt did not have any questions. Pt willingly and independently signed the ICF. A copy of the signed ICF and my business card were given to pt with instructions to call with any questions that may arise or if he should change his mind regarding participation in [employer] program.

## 2018-06-21 ENCOUNTER — TELEPHONE (OUTPATIENT)
Dept: HEMATOLOGY/ONCOLOGY | Facility: CLINIC | Age: 63
End: 2018-06-21

## 2018-06-22 ENCOUNTER — PATIENT MESSAGE (OUTPATIENT)
Dept: HEMATOLOGY/ONCOLOGY | Facility: CLINIC | Age: 63
End: 2018-06-22

## 2018-06-22 ENCOUNTER — HOSPITAL ENCOUNTER (OUTPATIENT)
Dept: INTERVENTIONAL RADIOLOGY/VASCULAR | Facility: HOSPITAL | Age: 63
Discharge: HOME | End: 2018-06-22
Attending: INTERNAL MEDICINE
Payer: COMMERCIAL

## 2018-06-22 VITALS
SYSTOLIC BLOOD PRESSURE: 142 MMHG | DIASTOLIC BLOOD PRESSURE: 97 MMHG | OXYGEN SATURATION: 99 % | HEART RATE: 81 BPM | RESPIRATION RATE: 18 BRPM

## 2018-06-22 DIAGNOSIS — C85.90 T-CELL LYMPHOMA: ICD-10-CM

## 2018-06-22 LAB
BODY SITE - BONE MARROW: NORMAL
CLINICAL DIAGNOSIS - BONE MARROW: NORMAL
FLOW CYTOMETRY ANTIBODIES ANALYZED - BONE MARROW: NORMAL
FLOW CYTOMETRY COMMENT - BONE MARROW: NORMAL
FLOW CYTOMETRY INTERPRETATION - BONE MARROW: NORMAL
HTLV I/II ANTIBODY, DONOR EVAL (BLOOD EVAL): NORMAL
T CELL GENE REARRANGEMENT, BM: NORMAL

## 2018-06-22 PROCEDURE — 76937 US GUIDE VASCULAR ACCESS: CPT | Mod: 26,,, | Performed by: RADIOLOGY

## 2018-06-22 PROCEDURE — 36569 INSJ PICC 5 YR+ W/O IMAGING: CPT | Mod: LT

## 2018-06-22 PROCEDURE — 77001 FLUOROGUIDE FOR VEIN DEVICE: CPT | Mod: 26,,, | Performed by: RADIOLOGY

## 2018-06-22 PROCEDURE — 36569 INSJ PICC 5 YR+ W/O IMAGING: CPT | Mod: LT,,, | Performed by: RADIOLOGY

## 2018-06-22 PROCEDURE — 76937 US GUIDE VASCULAR ACCESS: CPT | Mod: TC

## 2018-06-22 NOTE — PROCEDURES
Radiology Post-Procedure Note    Pre Op Diagnosis: T-cell lymphoma    Post Op Diagnosis: Same    Procedure: PICC placement    Procedure performed by: Flip HOLCOMB, Connie     Written Informed Consent Obtained: Yes    Specimen Removed: No    Estimated Blood Loss: Minimal    Findings:   Using realtime U/S guidance a 5 Fr PICC catheter was placed into the left Basilic Vein with tip of the catheter in the SVC.    PICC is ready for use.     Connie Castelan, ROSEMARY, FNP  Interventional Radiology  [phone] [employer]

## 2018-06-22 NOTE — PROGRESS NOTES
Patient stable, tolerated PICC placement well.  Discharge instruction, and follow up care reviewed by JAMES Franks RN  Patient verbalized understanding, and denies further questions.  Provided with ROCU and after hours number.

## 2018-06-22 NOTE — H&P
Radiology History & Physical      SUBJECTIVE:     Chief Complaint: venous acess    History of Present Illness:  Arjun Mcpherson Jr. is a 62 y.o. male with T-cell lymphoma who presents for PICC placement for chemotherpay venous access  Past Medical History:   Diagnosis Date    Hearing loss     Pre-diabetes      Past Surgical History:   Procedure Laterality Date    DISSECTION OF NECK Left 5/30/2018    Procedure: DISSECTION, NECK;  Surgeon: Regan King MD;  Location: [address];  Service: ENT;  Laterality: Left;    EXCISION OF PAROTID GLAND Left 5/30/2018    Procedure: EXCISION, PAROTID GLAND;  Surgeon: Regan King MD;  Location: [facility] OR [address] [place] [address];  Service: ENT;  Laterality: Left;    KNEE SURGERY Right        Home Meds:   Prior to Admission medications    Medication Sig Start Date End Date Taking? Authorizing Provider   bacitracin 500 unit/gram ointment Apply topically every 12 (twelve) hours. 6/1/18   Navid Harrison MD   cephALEXin (KEFLEX) 500 MG capsule Take 1 capsule (500 mg total) by mouth every 8 (eight) hours. 6/1/18   Navid Harrison MD   metFORMIN (GLUCOPHAGE) 500 MG tablet Take 1,000 mg by mouth 2 (two) times daily with meals. Take 1,000mg in the am and 500mg at 6pm    Historical Provider, MD   ondansetron (ZOFRAN) 8 MG tablet Take 1 tablet (8 mg total) by mouth every 8 (eight) hours as needed for Nausea. 6/19/18   Kodi Mcgill Jr., MD   oxyCODONE-acetaminophen (PERCOCET) 5-325 mg per tablet Take 1 tablet by mouth every 6 (six) hours as needed. 6/8/18   Regan King MD   predniSONE (DELTASONE) 50 MG Tab 2 tablets daily for 5 days, starting on first day of each chemotherapy course 6/19/18   Kodi Mcgill Jr., MD   senna-docusate 8.6-50 mg (PERICOLACE) 8.6-50 mg per tablet Take 1 tablet by mouth 2 (two) times daily. 6/1/18   Navid Harrison MD     Anticoagulants/Antiplatelets: no anticoagulation    Allergies: Review of patient's allergies indicates:  No  Known Allergies  Sedation History:  no adverse reactions    Review of Systems:   Hematological: no known coagulopathies  Respiratory: no shortness of breath  Cardiovascular: no chest pain  Gastrointestinal: no abdominal pain  Genito-Urinary: no dysuria  Musculoskeletal: negative  Neurological: no TIA or stroke symptoms         OBJECTIVE:     Vital Signs (Most Recent)       Physical Exam:  ASA: 3  Mallampati: na    General: no acute distress  Mental Status: alert and oriented to person, place and time  HEENT: normocephalic, atraumatic  Chest: unlabored breathing  Heart: regular heart rate  Abdomen: nondistended  Extremity: moves all extremities    Laboratory  No results found for: INR    Lab Results   Component Value Date    WBC 3.99 06/19/2018    HGB 14.1 06/19/2018    HCT 42.2 06/19/2018    MCV 91 06/19/2018     06/19/2018      Lab Results   Component Value Date     (H) 06/19/2018     (L) 06/19/2018    K 3.9 06/19/2018     06/19/2018    CO2 25 06/19/2018    BUN 9 06/19/2018    CREATININE 1.0 06/19/2018    CALCIUM 9.4 06/19/2018    ALT 19 06/19/2018    AST 15 06/19/2018    ALBUMIN 3.9 06/19/2018    BILITOT 0.6 06/19/2018       ASSESSMENT/PLAN:     Sedation Plan: local  Patient will undergo PICC line placment.    Joseph Taylor  Radiology PGY-4

## 2018-06-22 NOTE — DISCHARGE INSTRUCTIONS
For scheduling: Call Do at [phone]    For questions or concerns call: SYLVESTER MON-FRI 8 AM- 5PM [phone]. Radiology resident on call [phone].    For immediate concerns that are not emergent, you may call our radiology clinic at: [phone]

## 2018-06-23 ENCOUNTER — INFUSION (OUTPATIENT)
Dept: INFUSION THERAPY | Facility: HOSPITAL | Age: 63
End: 2018-06-23
Attending: INTERNAL MEDICINE
Payer: COMMERCIAL

## 2018-06-23 NOTE — NURSING
Pt came in for PICC dressing change- PICC was placed yesterday- no dressing change due. Education completed with patient and wife on PICC flushing and new medication that patient will get Monday.

## 2018-06-25 ENCOUNTER — INFUSION (OUTPATIENT)
Dept: INFUSION THERAPY | Facility: HOSPITAL | Age: 63
End: 2018-06-25
Attending: INTERNAL MEDICINE
Payer: COMMERCIAL

## 2018-06-25 VITALS
TEMPERATURE: 99 F | DIASTOLIC BLOOD PRESSURE: 87 MMHG | SYSTOLIC BLOOD PRESSURE: 122 MMHG | RESPIRATION RATE: 20 BRPM | HEART RATE: 104 BPM

## 2018-06-25 DIAGNOSIS — C85.90 T-CELL LYMPHOMA: Primary | ICD-10-CM

## 2018-06-25 PROCEDURE — 25000003 PHARM REV CODE 250: Performed by: INTERNAL MEDICINE

## 2018-06-25 PROCEDURE — 96416 CHEMO PROLONG INFUSE W/PUMP: CPT

## 2018-06-25 PROCEDURE — 96365 THER/PROPH/DIAG IV INF INIT: CPT

## 2018-06-25 PROCEDURE — A4216 STERILE WATER/SALINE, 10 ML: HCPCS | Performed by: INTERNAL MEDICINE

## 2018-06-25 PROCEDURE — 63600175 PHARM REV CODE 636 W HCPCS: Mod: JG | Performed by: INTERNAL MEDICINE

## 2018-06-25 RX ORDER — HEPARIN 100 UNIT/ML
500 SYRINGE INTRAVENOUS
Status: DISCONTINUED | OUTPATIENT
Start: 2018-06-25 | End: 2018-06-25 | Stop reason: HOSPADM

## 2018-06-25 RX ORDER — SODIUM CHLORIDE 0.9 % (FLUSH) 0.9 %
10 SYRINGE (ML) INJECTION
Status: CANCELLED | OUTPATIENT
Start: 2018-06-25

## 2018-06-25 RX ORDER — SODIUM CHLORIDE 0.9 % (FLUSH) 0.9 %
10 SYRINGE (ML) INJECTION
Status: DISCONTINUED | OUTPATIENT
Start: 2018-06-25 | End: 2018-06-25 | Stop reason: HOSPADM

## 2018-06-25 RX ORDER — SODIUM CHLORIDE 0.9 % (FLUSH) 0.9 %
10 SYRINGE (ML) INJECTION
Status: COMPLETED | OUTPATIENT
Start: 2018-06-25 | End: 2018-06-25

## 2018-06-25 RX ORDER — HEPARIN 100 UNIT/ML
500 SYRINGE INTRAVENOUS
Status: CANCELLED | OUTPATIENT
Start: 2018-06-25

## 2018-06-25 RX ADMIN — DEXAMETHASONE SODIUM PHOSPHATE: 4 INJECTION, SOLUTION INTRA-ARTICULAR; INTRALESIONAL; INTRAMUSCULAR; INTRAVENOUS; SOFT TISSUE at 02:06

## 2018-06-25 RX ADMIN — SODIUM CHLORIDE, PRESERVATIVE FREE 10 ML: 5 INJECTION INTRAVENOUS at 03:06

## 2018-06-25 RX ADMIN — VINCRISTINE SULFATE 24 MG: 1 INJECTION, SOLUTION INTRAVENOUS at 02:06

## 2018-06-26 ENCOUNTER — INFUSION (OUTPATIENT)
Dept: INFUSION THERAPY | Facility: HOSPITAL | Age: 63
End: 2018-06-26
Attending: INTERNAL MEDICINE
Payer: COMMERCIAL

## 2018-06-26 VITALS
HEART RATE: 94 BPM | SYSTOLIC BLOOD PRESSURE: 135 MMHG | TEMPERATURE: 99 F | DIASTOLIC BLOOD PRESSURE: 89 MMHG | RESPIRATION RATE: 18 BRPM

## 2018-06-26 DIAGNOSIS — C85.90 T-CELL LYMPHOMA: Primary | ICD-10-CM

## 2018-06-26 DIAGNOSIS — R73.01 ABNORMAL FASTING GLUCOSE: Primary | ICD-10-CM

## 2018-06-26 LAB
CHROM BANDING METHOD: NORMAL
CHROMOSOME ANALYSIS BM ADDITIONAL INFORMATION: NORMAL
CHROMOSOME ANALYSIS BM RELEASED BY: NORMAL
CHROMOSOME ANALYSIS BM RESULT SUMMARY: NORMAL
CLINICAL CYTOGENETICIST REVIEW: NORMAL
KARYOTYP MAR: NORMAL
REASON FOR REFERRAL (NARRATIVE): NORMAL
REF LAB TEST METHOD: NORMAL
SPECIMEN SOURCE: NORMAL
SPECIMEN: NORMAL

## 2018-06-26 PROCEDURE — 63600175 PHARM REV CODE 636 W HCPCS: Performed by: INTERNAL MEDICINE

## 2018-06-26 PROCEDURE — A4216 STERILE WATER/SALINE, 10 ML: HCPCS | Performed by: INTERNAL MEDICINE

## 2018-06-26 PROCEDURE — 25000003 PHARM REV CODE 250: Performed by: INTERNAL MEDICINE

## 2018-06-26 PROCEDURE — 96522 REFILL/MAINT PUMP/RESVR SYST: CPT

## 2018-06-26 RX ORDER — SODIUM CHLORIDE 0.9 % (FLUSH) 0.9 %
10 SYRINGE (ML) INJECTION
Status: DISCONTINUED | OUTPATIENT
Start: 2018-06-26 | End: 2018-06-26 | Stop reason: HOSPADM

## 2018-06-26 RX ORDER — HEPARIN 100 UNIT/ML
500 SYRINGE INTRAVENOUS
Status: DISCONTINUED | OUTPATIENT
Start: 2018-06-26 | End: 2018-06-26 | Stop reason: HOSPADM

## 2018-06-26 RX ADMIN — SODIUM CHLORIDE, PRESERVATIVE FREE 10 ML: 5 INJECTION INTRAVENOUS at 03:06

## 2018-06-26 RX ADMIN — VINCRISTINE SULFATE 24 MG: 1 INJECTION, SOLUTION INTRAVENOUS at 03:06

## 2018-06-26 RX ADMIN — HEPARIN 500 UNITS: 100 SYRINGE at 03:06

## 2018-06-26 NOTE — PLAN OF CARE
Problem: Chemotherapy Effects (Adult)  Goal: Signs and Symptoms of Listed Potential Problems Will be Absent, Minimized or Managed (Chemotherapy Effects)  Signs and symptoms of listed potential problems will be absent, minimized or managed by discharge/transition of care (reference Chemotherapy Effects (Adult) CPG).   Outcome: Ongoing (interventions implemented as appropriate)  Pt here for EPOCH pump exchange, accompanied by spouse, 8.2 mL remaining to infuse, pt has no complaints or concerns at present, reports tolerating treatment well; discussed treatment plan for today, all questions answered and pt agrees to proceed

## 2018-06-26 NOTE — PLAN OF CARE
Problem: Patient Care Overview  Goal: Plan of Care Review  Outcome: Ongoing (interventions implemented as appropriate)  Ambulatory pump connected to pt, infusing without difficulty; pt instructed to monitor tubing for any kinks, discussed troubleshooting pump issues, home care of pump and PICC site; pt instructed to remain well hydrated, to contact MD for any needs or concerns; pt instructed to arrive tomorrow at approx 1515 (prior to completion of pump infusion) so replacement pump can be release to pharmacy); pt and spouse verbalized understanding of all discussed and when to report next

## 2018-06-27 ENCOUNTER — INFUSION (OUTPATIENT)
Dept: INFUSION THERAPY | Facility: HOSPITAL | Age: 63
End: 2018-06-27
Attending: INTERNAL MEDICINE
Payer: COMMERCIAL

## 2018-06-27 VITALS
RESPIRATION RATE: 18 BRPM | SYSTOLIC BLOOD PRESSURE: 140 MMHG | HEART RATE: 71 BPM | DIASTOLIC BLOOD PRESSURE: 77 MMHG | TEMPERATURE: 99 F

## 2018-06-27 DIAGNOSIS — C85.90 T-CELL LYMPHOMA: Primary | ICD-10-CM

## 2018-06-27 PROCEDURE — 25000003 PHARM REV CODE 250: Performed by: INTERNAL MEDICINE

## 2018-06-27 PROCEDURE — 96416 CHEMO PROLONG INFUSE W/PUMP: CPT

## 2018-06-27 PROCEDURE — 63600175 PHARM REV CODE 636 W HCPCS: Performed by: INTERNAL MEDICINE

## 2018-06-27 RX ORDER — HEPARIN 100 UNIT/ML
500 SYRINGE INTRAVENOUS
Status: DISCONTINUED | OUTPATIENT
Start: 2018-06-27 | End: 2018-06-27 | Stop reason: HOSPADM

## 2018-06-27 RX ORDER — SODIUM CHLORIDE 0.9 % (FLUSH) 0.9 %
10 SYRINGE (ML) INJECTION
Status: DISCONTINUED | OUTPATIENT
Start: 2018-06-27 | End: 2018-06-27 | Stop reason: HOSPADM

## 2018-06-27 RX ADMIN — VINCRISTINE SULFATE 24 MG: 1 INJECTION, SOLUTION INTRAVENOUS at 04:06

## 2018-06-27 NOTE — PLAN OF CARE
Problem: Patient Care Overview  Goal: Plan of Care Review  Outcome: Ongoing (interventions implemented as appropriate)  Pt here for EPOCH pump exchange. Infusion completed. VSS. No s/s of reaction. New pump placed and infusing without difficulty. Verified by 2nd RN. Instructed to contact MD with any questions. AVS given to patient.

## 2018-06-28 ENCOUNTER — INFUSION (OUTPATIENT)
Dept: INFUSION THERAPY | Facility: HOSPITAL | Age: 63
End: 2018-06-28
Attending: INTERNAL MEDICINE
Payer: COMMERCIAL

## 2018-06-28 VITALS
RESPIRATION RATE: 18 BRPM | SYSTOLIC BLOOD PRESSURE: 143 MMHG | TEMPERATURE: 98 F | HEART RATE: 78 BPM | DIASTOLIC BLOOD PRESSURE: 90 MMHG

## 2018-06-28 DIAGNOSIS — Z00.6 EXAMINATION OF PARTICIPANT IN CLINICAL TRIAL: ICD-10-CM

## 2018-06-28 DIAGNOSIS — C85.90 T-CELL LYMPHOMA: Primary | ICD-10-CM

## 2018-06-28 PROCEDURE — 25000003 PHARM REV CODE 250: Performed by: INTERNAL MEDICINE

## 2018-06-28 PROCEDURE — 96521 REFILL/MAINT PORTABLE PUMP: CPT

## 2018-06-28 PROCEDURE — 63600175 PHARM REV CODE 636 W HCPCS: Performed by: INTERNAL MEDICINE

## 2018-06-28 RX ORDER — SODIUM CHLORIDE 0.9 % (FLUSH) 0.9 %
10 SYRINGE (ML) INJECTION
Status: DISCONTINUED | OUTPATIENT
Start: 2018-06-28 | End: 2018-06-28 | Stop reason: HOSPADM

## 2018-06-28 RX ORDER — HEPARIN 100 UNIT/ML
500 SYRINGE INTRAVENOUS
Status: COMPLETED | OUTPATIENT
Start: 2018-06-28 | End: 2018-06-28

## 2018-06-28 RX ADMIN — HEPARIN 500 UNITS: 100 SYRINGE at 04:06

## 2018-06-28 RX ADMIN — VINCRISTINE SULFATE 24 MG: 1 INJECTION, SOLUTION INTRAVENOUS at 04:06

## 2018-06-28 NOTE — PLAN OF CARE
Problem: Patient Care Overview  Goal: Plan of Care Review  Outcome: Ongoing (interventions implemented as appropriate)  Pt has EPOCH infusing through CADD pump to L arm PICC; no complication noted. VSS and NAD. Pt instructed to call MD with any concerns. Pt discharged home independently. RTC tomorrow for pump D/C

## 2018-06-29 ENCOUNTER — INFUSION (OUTPATIENT)
Dept: INFUSION THERAPY | Facility: HOSPITAL | Age: 63
End: 2018-06-29
Attending: INTERNAL MEDICINE
Payer: COMMERCIAL

## 2018-06-29 VITALS
TEMPERATURE: 99 F | DIASTOLIC BLOOD PRESSURE: 82 MMHG | WEIGHT: 241 LBS | HEIGHT: 74 IN | SYSTOLIC BLOOD PRESSURE: 136 MMHG | BODY MASS INDEX: 30.93 KG/M2 | HEART RATE: 104 BPM | RESPIRATION RATE: 18 BRPM

## 2018-06-29 DIAGNOSIS — C85.90 T-CELL LYMPHOMA: Primary | ICD-10-CM

## 2018-06-29 PROCEDURE — 25000003 PHARM REV CODE 250: Performed by: INTERNAL MEDICINE

## 2018-06-29 PROCEDURE — A4216 STERILE WATER/SALINE, 10 ML: HCPCS | Performed by: INTERNAL MEDICINE

## 2018-06-29 PROCEDURE — 96413 CHEMO IV INFUSION 1 HR: CPT

## 2018-06-29 PROCEDURE — 96367 TX/PROPH/DG ADDL SEQ IV INF: CPT

## 2018-06-29 PROCEDURE — 63600175 PHARM REV CODE 636 W HCPCS: Performed by: INTERNAL MEDICINE

## 2018-06-29 RX ORDER — HEPARIN 100 UNIT/ML
500 SYRINGE INTRAVENOUS
Status: DISCONTINUED | OUTPATIENT
Start: 2018-06-29 | End: 2018-06-29 | Stop reason: HOSPADM

## 2018-06-29 RX ORDER — SODIUM CHLORIDE 0.9 % (FLUSH) 0.9 %
10 SYRINGE (ML) INJECTION
Status: DISCONTINUED | OUTPATIENT
Start: 2018-06-29 | End: 2018-06-29 | Stop reason: HOSPADM

## 2018-06-29 RX ADMIN — DEXAMETHASONE SODIUM PHOSPHATE: 4 INJECTION, SOLUTION INTRA-ARTICULAR; INTRALESIONAL; INTRAMUSCULAR; INTRAVENOUS; SOFT TISSUE at 03:06

## 2018-06-29 RX ADMIN — SODIUM CHLORIDE, PRESERVATIVE FREE 10 ML: 5 INJECTION INTRAVENOUS at 04:06

## 2018-06-29 RX ADMIN — HEPARIN 500 UNITS: 100 SYRINGE at 04:06

## 2018-06-29 RX ADMIN — CYCLOPHOSPHAMIDE 1795 MG: 1 INJECTION, POWDER, FOR SOLUTION INTRAVENOUS; ORAL at 04:06

## 2018-06-29 NOTE — PLAN OF CARE
Problem: Patient Care Overview  Goal: Plan of Care Review  Outcome: Ongoing (interventions implemented as appropriate)  Pt tolerated cytoxan iwtout issue, picc line dsg changed, site clear, no redness or swelling, pt to rtc 6/30/18 for neulasta injection, no distress noted upon d/c to home

## 2018-06-29 NOTE — PLAN OF CARE
Problem: Chemotherapy Effects (Adult)  Goal: Signs and Symptoms of Listed Potential Problems Will be Absent, Minimized or Managed (Chemotherapy Effects)  Signs and symptoms of listed potential problems will be absent, minimized or managed by discharge/transition of care (reference Chemotherapy Effects (Adult) CPG).   Outcome: Ongoing (interventions implemented as appropriate)  Pt here for EPOCH pump d/c , cytoxan infusion D5C1, labs, hx, meds, allergies reviewed, pt with no complaints at this time, reclined in chair, continue to monitor

## 2018-06-30 ENCOUNTER — INFUSION (OUTPATIENT)
Dept: INFUSION THERAPY | Facility: HOSPITAL | Age: 63
End: 2018-06-30
Attending: INTERNAL MEDICINE
Payer: COMMERCIAL

## 2018-06-30 VITALS
HEART RATE: 72 BPM | SYSTOLIC BLOOD PRESSURE: 140 MMHG | DIASTOLIC BLOOD PRESSURE: 89 MMHG | RESPIRATION RATE: 18 BRPM | TEMPERATURE: 99 F

## 2018-06-30 DIAGNOSIS — C85.90 T-CELL LYMPHOMA: Primary | ICD-10-CM

## 2018-06-30 PROCEDURE — 96372 THER/PROPH/DIAG INJ SC/IM: CPT

## 2018-06-30 PROCEDURE — 63600175 PHARM REV CODE 636 W HCPCS: Mod: JG | Performed by: INTERNAL MEDICINE

## 2018-06-30 RX ADMIN — PEGFILGRASTIM 6 MG: 6 INJECTION SUBCUTANEOUS at 01:06

## 2018-06-30 NOTE — NURSING
Pt arrived for Neulasta injection, VSS, NAD noted.  Tolerated injection, states he started taking claritin last night.  AVS given, released in stable condition.

## 2018-07-03 ENCOUNTER — TELEPHONE (OUTPATIENT)
Dept: HEMATOLOGY/ONCOLOGY | Facility: CLINIC | Age: 63
End: 2018-07-03

## 2018-07-03 ENCOUNTER — LAB VISIT (OUTPATIENT)
Dept: LAB | Facility: HOSPITAL | Age: 63
End: 2018-07-03
Attending: INTERNAL MEDICINE
Payer: COMMERCIAL

## 2018-07-03 DIAGNOSIS — R73.01 ABNORMAL FASTING GLUCOSE: ICD-10-CM

## 2018-07-03 DIAGNOSIS — C85.90 T-CELL LYMPHOMA: ICD-10-CM

## 2018-07-03 DIAGNOSIS — Z00.6 EXAMINATION OF PARTICIPANT IN CLINICAL TRIAL: ICD-10-CM

## 2018-07-03 LAB
ANISOCYTOSIS BLD QL SMEAR: SLIGHT
BASOPHILS # BLD AUTO: ABNORMAL K/UL
BASOPHILS NFR BLD: 0 %
DIFFERENTIAL METHOD: ABNORMAL
DRUG STUDY SPECIMEN TYPE: NORMAL
DRUG STUDY TEST NAME: NORMAL
DRUG STUDY TEST RESULT: NORMAL
EOSINOPHIL # BLD AUTO: ABNORMAL K/UL
EOSINOPHIL NFR BLD: 1 %
ERYTHROCYTE [DISTWIDTH] IN BLOOD BY AUTOMATED COUNT: 12 %
GLUCOSE SERPL-MCNC: 242 MG/DL
HCT VFR BLD AUTO: 38.9 %
HGB BLD-MCNC: 13 G/DL
IMM GRANULOCYTES # BLD AUTO: ABNORMAL K/UL
IMM GRANULOCYTES NFR BLD AUTO: ABNORMAL %
LYMPHOCYTES # BLD AUTO: ABNORMAL K/UL
LYMPHOCYTES NFR BLD: 19 %
MCH RBC QN AUTO: 30.2 PG
MCHC RBC AUTO-ENTMCNC: 33.4 G/DL
MCV RBC AUTO: 90 FL
MONOCYTES # BLD AUTO: ABNORMAL K/UL
MONOCYTES NFR BLD: 0 %
NEUTROPHILS NFR BLD: 80 %
NRBC BLD-RTO: 0 /100 WBC
PLATELET # BLD AUTO: 119 K/UL
PLATELET BLD QL SMEAR: ABNORMAL
PMV BLD AUTO: 9.7 FL
RBC # BLD AUTO: 4.31 M/UL
WBC # BLD AUTO: 4.95 K/UL

## 2018-07-03 PROCEDURE — 85027 COMPLETE CBC AUTOMATED: CPT

## 2018-07-03 PROCEDURE — 82947 ASSAY GLUCOSE BLOOD QUANT: CPT

## 2018-07-03 PROCEDURE — 36415 COLL VENOUS BLD VENIPUNCTURE: CPT

## 2018-07-03 PROCEDURE — 99000 SPECIMEN HANDLING OFFICE-LAB: CPT

## 2018-07-03 PROCEDURE — 85007 BL SMEAR W/DIFF WBC COUNT: CPT

## 2018-07-03 NOTE — TELEPHONE ENCOUNTER
Doing well so far after EPOCH.  No increase in neuropathy symptoms.  Some bone and joint pains after Neulasta.

## 2018-07-04 ENCOUNTER — PATIENT MESSAGE (OUTPATIENT)
Dept: HEMATOLOGY/ONCOLOGY | Facility: CLINIC | Age: 63
End: 2018-07-04

## 2018-07-05 ENCOUNTER — INFUSION (OUTPATIENT)
Dept: INFUSION THERAPY | Facility: HOSPITAL | Age: 63
End: 2018-07-05
Attending: INTERNAL MEDICINE
Payer: COMMERCIAL

## 2018-07-05 ENCOUNTER — PATIENT MESSAGE (OUTPATIENT)
Dept: HEMATOLOGY/ONCOLOGY | Facility: CLINIC | Age: 63
End: 2018-07-05

## 2018-07-05 DIAGNOSIS — C85.90 T-CELL LYMPHOMA: Primary | ICD-10-CM

## 2018-07-05 DIAGNOSIS — K59.00 CONSTIPATION, UNSPECIFIED CONSTIPATION TYPE: Primary | ICD-10-CM

## 2018-07-05 PROCEDURE — 25000003 PHARM REV CODE 250: Performed by: INTERNAL MEDICINE

## 2018-07-05 PROCEDURE — A4216 STERILE WATER/SALINE, 10 ML: HCPCS | Performed by: INTERNAL MEDICINE

## 2018-07-05 PROCEDURE — 63600175 PHARM REV CODE 636 W HCPCS: Performed by: INTERNAL MEDICINE

## 2018-07-05 RX ORDER — SODIUM CHLORIDE 0.9 % (FLUSH) 0.9 %
10 SYRINGE (ML) INJECTION
Status: CANCELLED | OUTPATIENT
Start: 2018-07-05

## 2018-07-05 RX ORDER — SODIUM CHLORIDE 0.9 % (FLUSH) 0.9 %
10 SYRINGE (ML) INJECTION
Status: COMPLETED | OUTPATIENT
Start: 2018-07-05 | End: 2018-07-05

## 2018-07-05 RX ORDER — HEPARIN 100 UNIT/ML
500 SYRINGE INTRAVENOUS
Status: COMPLETED | OUTPATIENT
Start: 2018-07-05 | End: 2018-07-05

## 2018-07-05 RX ORDER — LACTULOSE 10 G/15ML
SOLUTION ORAL
Qty: 900 ML | Refills: 8 | Status: ON HOLD | OUTPATIENT
Start: 2018-07-05 | End: 2018-11-01

## 2018-07-05 RX ORDER — HEPARIN 100 UNIT/ML
500 SYRINGE INTRAVENOUS
Status: CANCELLED | OUTPATIENT
Start: 2018-07-05

## 2018-07-05 RX ADMIN — HEPARIN 500 UNITS: 100 SYRINGE at 08:07

## 2018-07-05 RX ADMIN — SODIUM CHLORIDE, PRESERVATIVE FREE 10 ML: 5 INJECTION INTRAVENOUS at 08:07

## 2018-07-09 ENCOUNTER — LAB VISIT (OUTPATIENT)
Dept: LAB | Facility: HOSPITAL | Age: 63
End: 2018-07-09
Attending: INTERNAL MEDICINE
Payer: COMMERCIAL

## 2018-07-09 DIAGNOSIS — C85.90 T-CELL LYMPHOMA: ICD-10-CM

## 2018-07-09 DIAGNOSIS — R73.01 ABNORMAL FASTING GLUCOSE: ICD-10-CM

## 2018-07-09 DIAGNOSIS — R73.9 HYPERGLYCEMIA: Primary | ICD-10-CM

## 2018-07-09 LAB
ANISOCYTOSIS BLD QL SMEAR: SLIGHT
BASOPHILS # BLD AUTO: ABNORMAL K/UL
BASOPHILS NFR BLD: 2 %
DIFFERENTIAL METHOD: ABNORMAL
EOSINOPHIL # BLD AUTO: ABNORMAL K/UL
EOSINOPHIL NFR BLD: 0 %
ERYTHROCYTE [DISTWIDTH] IN BLOOD BY AUTOMATED COUNT: 12 %
GLUCOSE SERPL-MCNC: 265 MG/DL
HCT VFR BLD AUTO: 40 %
HGB BLD-MCNC: 13.4 G/DL
IMM GRANULOCYTES # BLD AUTO: ABNORMAL K/UL
IMM GRANULOCYTES NFR BLD AUTO: ABNORMAL %
LYMPHOCYTES # BLD AUTO: ABNORMAL K/UL
LYMPHOCYTES NFR BLD: 32 %
MCH RBC QN AUTO: 30.5 PG
MCHC RBC AUTO-ENTMCNC: 33.5 G/DL
MCV RBC AUTO: 91 FL
METAMYELOCYTES NFR BLD MANUAL: 1 %
MONOCYTES # BLD AUTO: ABNORMAL K/UL
MONOCYTES NFR BLD: 7 %
MYELOCYTES NFR BLD MANUAL: 1 %
NEUTROPHILS NFR BLD: 52 %
NEUTS BAND NFR BLD MANUAL: 5 %
NRBC BLD-RTO: 1 /100 WBC
PLATELET # BLD AUTO: 161 K/UL
PLATELET BLD QL SMEAR: ABNORMAL
PMV BLD AUTO: 10.5 FL
RBC # BLD AUTO: 4.39 M/UL
WBC # BLD AUTO: 5.32 K/UL

## 2018-07-09 PROCEDURE — 36415 COLL VENOUS BLD VENIPUNCTURE: CPT

## 2018-07-09 PROCEDURE — 85007 BL SMEAR W/DIFF WBC COUNT: CPT

## 2018-07-09 PROCEDURE — 85027 COMPLETE CBC AUTOMATED: CPT

## 2018-07-09 PROCEDURE — 82947 ASSAY GLUCOSE BLOOD QUANT: CPT

## 2018-07-12 ENCOUNTER — INFUSION (OUTPATIENT)
Dept: INFUSION THERAPY | Facility: HOSPITAL | Age: 63
End: 2018-07-12
Attending: INTERNAL MEDICINE
Payer: COMMERCIAL

## 2018-07-12 DIAGNOSIS — C85.90 T-CELL LYMPHOMA: Primary | ICD-10-CM

## 2018-07-12 PROCEDURE — 96523 IRRIG DRUG DELIVERY DEVICE: CPT

## 2018-07-12 PROCEDURE — A4216 STERILE WATER/SALINE, 10 ML: HCPCS | Performed by: INTERNAL MEDICINE

## 2018-07-12 PROCEDURE — 25000003 PHARM REV CODE 250: Performed by: INTERNAL MEDICINE

## 2018-07-12 PROCEDURE — 63600175 PHARM REV CODE 636 W HCPCS: Performed by: INTERNAL MEDICINE

## 2018-07-12 RX ORDER — SODIUM CHLORIDE 0.9 % (FLUSH) 0.9 %
10 SYRINGE (ML) INJECTION
Status: COMPLETED | OUTPATIENT
Start: 2018-07-12 | End: 2018-07-12

## 2018-07-12 RX ORDER — HEPARIN 100 UNIT/ML
500 SYRINGE INTRAVENOUS
Status: COMPLETED | OUTPATIENT
Start: 2018-07-12 | End: 2018-07-12

## 2018-07-12 RX ORDER — SODIUM CHLORIDE 0.9 % (FLUSH) 0.9 %
10 SYRINGE (ML) INJECTION
Status: CANCELLED | OUTPATIENT
Start: 2018-07-12

## 2018-07-12 RX ORDER — HEPARIN 100 UNIT/ML
500 SYRINGE INTRAVENOUS
Status: CANCELLED | OUTPATIENT
Start: 2018-07-12

## 2018-07-12 RX ADMIN — HEPARIN 500 UNITS: 100 SYRINGE at 09:07

## 2018-07-12 RX ADMIN — SODIUM CHLORIDE, PRESERVATIVE FREE 10 ML: 5 INJECTION INTRAVENOUS at 09:07

## 2018-07-16 ENCOUNTER — INFUSION (OUTPATIENT)
Dept: INFUSION THERAPY | Facility: HOSPITAL | Age: 63
End: 2018-07-16
Attending: INTERNAL MEDICINE
Payer: COMMERCIAL

## 2018-07-16 ENCOUNTER — OFFICE VISIT (OUTPATIENT)
Dept: HEMATOLOGY/ONCOLOGY | Facility: CLINIC | Age: 63
End: 2018-07-16
Payer: COMMERCIAL

## 2018-07-16 VITALS
HEIGHT: 74 IN | WEIGHT: 236.31 LBS | SYSTOLIC BLOOD PRESSURE: 122 MMHG | HEART RATE: 76 BPM | BODY MASS INDEX: 30.33 KG/M2 | DIASTOLIC BLOOD PRESSURE: 78 MMHG

## 2018-07-16 VITALS
HEART RATE: 76 BPM | RESPIRATION RATE: 18 BRPM | TEMPERATURE: 99 F | SYSTOLIC BLOOD PRESSURE: 129 MMHG | DIASTOLIC BLOOD PRESSURE: 85 MMHG

## 2018-07-16 DIAGNOSIS — C85.90 T-CELL LYMPHOMA: Primary | ICD-10-CM

## 2018-07-16 DIAGNOSIS — G62.9 PERIPHERAL POLYNEUROPATHY: ICD-10-CM

## 2018-07-16 PROCEDURE — 63600175 PHARM REV CODE 636 W HCPCS: Performed by: INTERNAL MEDICINE

## 2018-07-16 PROCEDURE — 25000003 PHARM REV CODE 250: Performed by: INTERNAL MEDICINE

## 2018-07-16 PROCEDURE — 3008F BODY MASS INDEX DOCD: CPT | Mod: CPTII,S$GLB,, | Performed by: INTERNAL MEDICINE

## 2018-07-16 PROCEDURE — 99214 OFFICE O/P EST MOD 30 MIN: CPT | Mod: S$GLB,,, | Performed by: INTERNAL MEDICINE

## 2018-07-16 PROCEDURE — 96365 THER/PROPH/DIAG IV INF INIT: CPT

## 2018-07-16 PROCEDURE — 99999 PR PBB SHADOW E&M-EST. PATIENT-LVL III: CPT | Mod: PBBFAC,,, | Performed by: INTERNAL MEDICINE

## 2018-07-16 PROCEDURE — 96416 CHEMO PROLONG INFUSE W/PUMP: CPT

## 2018-07-16 RX ORDER — HEPARIN 100 UNIT/ML
500 SYRINGE INTRAVENOUS
Status: CANCELLED | OUTPATIENT
Start: 2018-07-18

## 2018-07-16 RX ORDER — SODIUM CHLORIDE 0.9 % (FLUSH) 0.9 %
10 SYRINGE (ML) INJECTION
Status: CANCELLED | OUTPATIENT
Start: 2018-07-19

## 2018-07-16 RX ORDER — LEVOFLOXACIN 750 MG/1
TABLET ORAL
Qty: 10 TABLET | Refills: 5 | Status: ON HOLD | OUTPATIENT
Start: 2018-07-16 | End: 2018-11-01

## 2018-07-16 RX ORDER — HEPARIN 100 UNIT/ML
500 SYRINGE INTRAVENOUS
Status: CANCELLED | OUTPATIENT
Start: 2018-07-16

## 2018-07-16 RX ORDER — SODIUM CHLORIDE 0.9 % (FLUSH) 0.9 %
10 SYRINGE (ML) INJECTION
Status: DISCONTINUED | OUTPATIENT
Start: 2018-07-16 | End: 2018-07-16 | Stop reason: HOSPADM

## 2018-07-16 RX ORDER — SODIUM CHLORIDE 0.9 % (FLUSH) 0.9 %
10 SYRINGE (ML) INJECTION
Status: CANCELLED | OUTPATIENT
Start: 2018-07-20

## 2018-07-16 RX ORDER — HEPARIN 100 UNIT/ML
500 SYRINGE INTRAVENOUS
Status: CANCELLED | OUTPATIENT
Start: 2018-07-20

## 2018-07-16 RX ORDER — OXYCODONE AND ACETAMINOPHEN 5; 325 MG/1; MG/1
TABLET ORAL
Qty: 50 TABLET | Refills: 0 | Status: SHIPPED | OUTPATIENT
Start: 2018-07-16 | End: 2018-10-01

## 2018-07-16 RX ORDER — SODIUM CHLORIDE 0.9 % (FLUSH) 0.9 %
10 SYRINGE (ML) INJECTION
Status: CANCELLED | OUTPATIENT
Start: 2018-07-18

## 2018-07-16 RX ORDER — SODIUM CHLORIDE 0.9 % (FLUSH) 0.9 %
10 SYRINGE (ML) INJECTION
Status: CANCELLED | OUTPATIENT
Start: 2018-07-16

## 2018-07-16 RX ORDER — HEPARIN 100 UNIT/ML
500 SYRINGE INTRAVENOUS
Status: CANCELLED | OUTPATIENT
Start: 2018-07-17

## 2018-07-16 RX ORDER — HEPARIN 100 UNIT/ML
500 SYRINGE INTRAVENOUS
Status: CANCELLED | OUTPATIENT
Start: 2018-07-19

## 2018-07-16 RX ORDER — SODIUM CHLORIDE 0.9 % (FLUSH) 0.9 %
10 SYRINGE (ML) INJECTION
Status: CANCELLED | OUTPATIENT
Start: 2018-07-17

## 2018-07-16 RX ORDER — HEPARIN 100 UNIT/ML
500 SYRINGE INTRAVENOUS
Status: DISCONTINUED | OUTPATIENT
Start: 2018-07-16 | End: 2018-07-16 | Stop reason: HOSPADM

## 2018-07-16 RX ADMIN — VINCRISTINE SULFATE 24 MG: 1 INJECTION, SOLUTION INTRAVENOUS at 08:07

## 2018-07-16 RX ADMIN — DEXAMETHASONE SODIUM PHOSPHATE: 4 INJECTION, SOLUTION INTRA-ARTICULAR; INTRALESIONAL; INTRAMUSCULAR; INTRAVENOUS; SOFT TISSUE at 08:07

## 2018-07-16 RX ADMIN — HEPARIN 500 UNITS: 100 SYRINGE at 08:07

## 2018-07-16 NOTE — PLAN OF CARE
Problem: Patient Care Overview  Goal: Plan of Care Review  Outcome: Ongoing (interventions implemented as appropriate)  Pt arrived for EPOCH pump. CADD pump connected to run over 24 hours at 20 ml/hr. Connections secure and wrapped. Pt to RTC tomorrow AM around 8 am for D2 pump exchange. Distal lumen to PICC line infusing; blood return present in line. Proximal lumen hep locked. VSS; NAD. Discharging unassisted.

## 2018-07-16 NOTE — PROGRESS NOTES
"Mr. Mcpherson is a 63-year-old man who is being treated for a peripheral T-cell   lymphoma.  In my note of 06/19/2018, I recorded the history of his finding swelling   over his left parotid area and the surgery that Dr. King performed on   05/30/2018.    He was not found to have disease in any sites other than the neck, so his stage   is IE.  A PET scan on 06/15/2018 was normal except for a lymph node just to the   left of midline in the upper neck with an SUV max of 18.4.    He has been told that he is "pre-diabetic" and is taking metformin.    He started an initial cycle of EPOCH on June 29, 2018.  The major symptom   he reported with this therapy was generalized muscle and bone aching that began   two days after a Neulasta injection and lasted for about 4 days.  He took several   oxycodone during that time.  He also was fatigued for about 1 week.  He   experienced some numbness in his feet for the last nine or 10 months and he   reports today no change, nor has there been any new symptoms suggestive of   peripheral neuropathy.    His white blood cell count declined to 1600 with an ANC of 96 on 07/05/2018.  He   did not have fevers or chills.  His platelet count fell to 87,000 on the   same day.  By 07/09/2018, the white count was 5320 and platelets 116,000.  His   blood glucoses have generally been above normal, in the range of 196-265.  He   had not been taking metformin prior to starting the recent treatment, but he is   taking it again now.    ADDITIONAL PAST HISTORY, SYSTEM REVIEW, SOCIAL HISTORY AND FAMILY HISTORY:  Have   been reviewed and updated in the electronic record.    PHYSICAL EXAMINATION:  GENERAL APPEARANCE:  Well-developed, well-nourished man in no distress.  EYES:  No jaundice or pallor.  MOUTH AND THROAT:  Good dentition.  No mucosal lesions.  NECK:  Surgical defect in the left neck.  Left facial palsy.  The surgical site   is well healed.  LYMPH NODES:  I no longer feel the submental lymph " node that was present when I   examined him on 06/19/2018.  CHEST AND LUNGS:  Normal respiratory effort.  Clear to auscultation and   percussion.  HEART:  Regular rate and rhythm without murmur or gallop.  ABDOMEN:  Soft without masses or tenderness.  No hepatosplenomegaly.  EXTREMITIES:  No edema or cyanosis.  PERIPHERAL VASCULATURE:  Good pulses in the feet and ankles.  NEUROLOGIC:  Left facial nerve palsy.  Motor function is normal.  Mental status   is good.  He is fully oriented.  SKIN:  No suspicious lesions in the areas examined.    LABORATORY STUDIES:  On 07/09/2018, hemoglobin was 13.4, platelets were 161,000   and WBC 5320 with 52% granulocytes.  On 07/12/2018, the hemoglobin was 13.1,   platelets 344,000 and WBC 5820 with an absolute granulocyte count of 3200.    Glucose was 213.    IMPRESSION:  Stage IE peripheral T-cell lymphoma.    RECOMMENDATIONS:  1.  He will start a second course of EPOCH therapy today.  2.  Continue CBC and glucose every Monday and Thursday.  3.  On days 6, he will start Levaquin 750 mg and take this once daily for a   total of 10 days.  4.  Return visit (EPA) in three weeks (or if necessary a few days before) with   CBC, CMP and appointment to start the third cycle of EPOCH.      AWB/HN  dd: 07/16/2018 07:47:56 (CDT)  td: 07/16/2018 19:49:42 (CDT)  Doc ID   #[id number]  Job ID #[id number]    CC:

## 2018-07-17 ENCOUNTER — INFUSION (OUTPATIENT)
Dept: INFUSION THERAPY | Facility: HOSPITAL | Age: 63
End: 2018-07-17
Attending: INTERNAL MEDICINE
Payer: COMMERCIAL

## 2018-07-17 VITALS
HEART RATE: 83 BPM | RESPIRATION RATE: 18 BRPM | DIASTOLIC BLOOD PRESSURE: 84 MMHG | TEMPERATURE: 98 F | SYSTOLIC BLOOD PRESSURE: 135 MMHG

## 2018-07-17 DIAGNOSIS — C85.90 T-CELL LYMPHOMA: Primary | ICD-10-CM

## 2018-07-17 PROCEDURE — 25000003 PHARM REV CODE 250: Performed by: INTERNAL MEDICINE

## 2018-07-17 PROCEDURE — 63600175 PHARM REV CODE 636 W HCPCS: Performed by: INTERNAL MEDICINE

## 2018-07-17 PROCEDURE — A4216 STERILE WATER/SALINE, 10 ML: HCPCS | Performed by: INTERNAL MEDICINE

## 2018-07-17 PROCEDURE — 96416 CHEMO PROLONG INFUSE W/PUMP: CPT

## 2018-07-17 RX ORDER — SODIUM CHLORIDE 0.9 % (FLUSH) 0.9 %
10 SYRINGE (ML) INJECTION
Status: DISCONTINUED | OUTPATIENT
Start: 2018-07-17 | End: 2018-07-17 | Stop reason: HOSPADM

## 2018-07-17 RX ORDER — HEPARIN 100 UNIT/ML
500 SYRINGE INTRAVENOUS
Status: DISCONTINUED | OUTPATIENT
Start: 2018-07-17 | End: 2018-07-17 | Stop reason: HOSPADM

## 2018-07-17 RX ADMIN — SODIUM CHLORIDE, PRESERVATIVE FREE 10 ML: 5 INJECTION INTRAVENOUS at 02:07

## 2018-07-17 RX ADMIN — VINCRISTINE SULFATE 24 MG: 1 INJECTION, SOLUTION INTRAVENOUS at 02:07

## 2018-07-17 NOTE — PLAN OF CARE
Problem: Patient Care Overview  Goal: Discharge Needs Assessment  Outcome: Ongoing (interventions implemented as appropriate)  Pt arrives to clinic for CADD pump EPOCH change no reported adverse events. Tolerating treatment well. All settings checked x 2 RN's lines and dressing re enforced. Pt leaves clinic ambulatory no distress, instructed to contact MD office with any questions, infusion appointment for pump change in place for 7/18/18

## 2018-07-18 ENCOUNTER — INFUSION (OUTPATIENT)
Dept: INFUSION THERAPY | Facility: HOSPITAL | Age: 63
End: 2018-07-18
Attending: INTERNAL MEDICINE
Payer: COMMERCIAL

## 2018-07-18 VITALS
HEART RATE: 84 BPM | RESPIRATION RATE: 20 BRPM | DIASTOLIC BLOOD PRESSURE: 80 MMHG | SYSTOLIC BLOOD PRESSURE: 138 MMHG | TEMPERATURE: 98 F

## 2018-07-18 DIAGNOSIS — C85.90 T-CELL LYMPHOMA: Primary | ICD-10-CM

## 2018-07-18 PROCEDURE — 96416 CHEMO PROLONG INFUSE W/PUMP: CPT

## 2018-07-18 PROCEDURE — 25000003 PHARM REV CODE 250: Performed by: INTERNAL MEDICINE

## 2018-07-18 PROCEDURE — 63600175 PHARM REV CODE 636 W HCPCS: Performed by: INTERNAL MEDICINE

## 2018-07-18 RX ORDER — HEPARIN 100 UNIT/ML
500 SYRINGE INTRAVENOUS
Status: DISCONTINUED | OUTPATIENT
Start: 2018-07-18 | End: 2018-07-18 | Stop reason: HOSPADM

## 2018-07-18 RX ORDER — SODIUM CHLORIDE 0.9 % (FLUSH) 0.9 %
10 SYRINGE (ML) INJECTION
Status: DISCONTINUED | OUTPATIENT
Start: 2018-07-18 | End: 2018-07-18 | Stop reason: HOSPADM

## 2018-07-18 RX ADMIN — VINCRISTINE SULFATE 24 MG: 1 INJECTION, SOLUTION INTRAVENOUS at 02:07

## 2018-07-18 NOTE — PLAN OF CARE
Problem: Patient Care Overview  Goal: Discharge Needs Assessment  Outcome: Ongoing (interventions implemented as appropriate)  Arrived for EPOCH pump exchange, reports no adverse reactions, PICC line dressing clean dry intact, lines flushed. All connections secured, settings checked x 2 RN's. Pt leaves clinic ambulatory no distress. Vitals stable. Will rtn 7/19/18 for pump exchange.

## 2018-07-19 ENCOUNTER — INFUSION (OUTPATIENT)
Dept: INFUSION THERAPY | Facility: HOSPITAL | Age: 63
End: 2018-07-19
Attending: INTERNAL MEDICINE
Payer: COMMERCIAL

## 2018-07-19 VITALS
DIASTOLIC BLOOD PRESSURE: 85 MMHG | SYSTOLIC BLOOD PRESSURE: 138 MMHG | RESPIRATION RATE: 18 BRPM | TEMPERATURE: 100 F | HEART RATE: 82 BPM

## 2018-07-19 DIAGNOSIS — C85.90 T-CELL LYMPHOMA: Primary | ICD-10-CM

## 2018-07-19 DIAGNOSIS — R73.01 ABNORMAL FASTING GLUCOSE: ICD-10-CM

## 2018-07-19 LAB
BASOPHILS # BLD AUTO: 0.03 K/UL
BASOPHILS NFR BLD: 0.8 %
DIFFERENTIAL METHOD: ABNORMAL
EOSINOPHIL # BLD AUTO: 0 K/UL
EOSINOPHIL NFR BLD: 0 %
ERYTHROCYTE [DISTWIDTH] IN BLOOD BY AUTOMATED COUNT: 12.6 %
GLUCOSE SERPL-MCNC: 266 MG/DL
HCT VFR BLD AUTO: 37.4 %
HGB BLD-MCNC: 12.9 G/DL
IMM GRANULOCYTES # BLD AUTO: 0.01 K/UL
IMM GRANULOCYTES NFR BLD AUTO: 0.3 %
LYMPHOCYTES # BLD AUTO: 0.5 K/UL
LYMPHOCYTES NFR BLD: 11.3 %
MCH RBC QN AUTO: 30.4 PG
MCHC RBC AUTO-ENTMCNC: 34.5 G/DL
MCV RBC AUTO: 88 FL
MONOCYTES # BLD AUTO: 0.1 K/UL
MONOCYTES NFR BLD: 1.3 %
NEUTROPHILS # BLD AUTO: 3.4 K/UL
NEUTROPHILS NFR BLD: 86.3 %
NRBC BLD-RTO: 0 /100 WBC
PLATELET # BLD AUTO: 381 K/UL
PMV BLD AUTO: 9.9 FL
RBC # BLD AUTO: 4.24 M/UL
WBC # BLD AUTO: 3.97 K/UL

## 2018-07-19 PROCEDURE — 96521 REFILL/MAINT PORTABLE PUMP: CPT

## 2018-07-19 PROCEDURE — 82947 ASSAY GLUCOSE BLOOD QUANT: CPT

## 2018-07-19 PROCEDURE — 63600175 PHARM REV CODE 636 W HCPCS: Performed by: INTERNAL MEDICINE

## 2018-07-19 PROCEDURE — 25000003 PHARM REV CODE 250: Performed by: INTERNAL MEDICINE

## 2018-07-19 PROCEDURE — 85025 COMPLETE CBC W/AUTO DIFF WBC: CPT

## 2018-07-19 RX ORDER — HEPARIN 100 UNIT/ML
500 SYRINGE INTRAVENOUS
Status: DISCONTINUED | OUTPATIENT
Start: 2018-07-19 | End: 2018-07-19 | Stop reason: HOSPADM

## 2018-07-19 RX ORDER — SODIUM CHLORIDE 0.9 % (FLUSH) 0.9 %
10 SYRINGE (ML) INJECTION
Status: DISCONTINUED | OUTPATIENT
Start: 2018-07-19 | End: 2018-07-19 | Stop reason: HOSPADM

## 2018-07-19 RX ORDER — SODIUM CHLORIDE 0.9 % (FLUSH) 0.9 %
10 SYRINGE (ML) INJECTION
Status: CANCELLED | OUTPATIENT
Start: 2018-07-19

## 2018-07-19 RX ORDER — HEPARIN 100 UNIT/ML
500 SYRINGE INTRAVENOUS
Status: CANCELLED | OUTPATIENT
Start: 2018-07-19

## 2018-07-19 RX ADMIN — VINCRISTINE SULFATE 20 ML/HR: 1 INJECTION, SOLUTION INTRAVENOUS at 03:07

## 2018-07-19 NOTE — PLAN OF CARE
Problem: Patient Care Overview  Goal: Plan of Care Review  Outcome: Ongoing (interventions implemented as appropriate)  Tolerated treatment well.  Advised to call MD for any problems or concerns.  AVS declined.  RTC on tomorrow. NAD noted upon discharge.

## 2018-07-20 ENCOUNTER — INFUSION (OUTPATIENT)
Dept: INFUSION THERAPY | Facility: HOSPITAL | Age: 63
End: 2018-07-20
Attending: INTERNAL MEDICINE
Payer: COMMERCIAL

## 2018-07-20 VITALS
DIASTOLIC BLOOD PRESSURE: 74 MMHG | SYSTOLIC BLOOD PRESSURE: 134 MMHG | TEMPERATURE: 98 F | RESPIRATION RATE: 18 BRPM | HEART RATE: 81 BPM

## 2018-07-20 DIAGNOSIS — C85.90 T-CELL LYMPHOMA: Primary | ICD-10-CM

## 2018-07-20 PROCEDURE — 96413 CHEMO IV INFUSION 1 HR: CPT

## 2018-07-20 PROCEDURE — 63600175 PHARM REV CODE 636 W HCPCS: Mod: JG | Performed by: INTERNAL MEDICINE

## 2018-07-20 PROCEDURE — 25000003 PHARM REV CODE 250: Performed by: INTERNAL MEDICINE

## 2018-07-20 PROCEDURE — 96367 TX/PROPH/DG ADDL SEQ IV INF: CPT

## 2018-07-20 RX ORDER — HEPARIN 100 UNIT/ML
500 SYRINGE INTRAVENOUS
Status: DISCONTINUED | OUTPATIENT
Start: 2018-07-20 | End: 2018-07-20 | Stop reason: HOSPADM

## 2018-07-20 RX ORDER — SODIUM CHLORIDE 0.9 % (FLUSH) 0.9 %
10 SYRINGE (ML) INJECTION
Status: DISCONTINUED | OUTPATIENT
Start: 2018-07-20 | End: 2018-07-20 | Stop reason: HOSPADM

## 2018-07-20 RX ADMIN — DEXAMETHASONE SODIUM PHOSPHATE: 4 INJECTION, SOLUTION INTRA-ARTICULAR; INTRALESIONAL; INTRAMUSCULAR; INTRAVENOUS; SOFT TISSUE at 03:07

## 2018-07-20 RX ADMIN — CYCLOPHOSPHAMIDE 1795 MG: 1 INJECTION, POWDER, FOR SOLUTION INTRAVENOUS; ORAL at 04:07

## 2018-07-20 NOTE — PLAN OF CARE
Problem: Chemotherapy Effects (Adult)  Goal: Signs and Symptoms of Listed Potential Problems Will be Absent, Minimized or Managed (Chemotherapy Effects)  Signs and symptoms of listed potential problems will be absent, minimized or managed by discharge/transition of care (reference Chemotherapy Effects (Adult) CPG).   Outcome: Ongoing (interventions implemented as appropriate)  Patient is here for EPOCH pump d/c and cytoxan.  Assessment completed and labs reviewed.  VSS.  No needs at this time.  Chair reclined and blanket offered.  Will continue to monitor.

## 2018-07-21 ENCOUNTER — INFUSION (OUTPATIENT)
Dept: INFUSION THERAPY | Facility: HOSPITAL | Age: 63
End: 2018-07-21
Attending: INTERNAL MEDICINE
Payer: COMMERCIAL

## 2018-07-21 DIAGNOSIS — C85.90 T-CELL LYMPHOMA: Primary | ICD-10-CM

## 2018-07-21 PROCEDURE — 63600175 PHARM REV CODE 636 W HCPCS: Mod: JG | Performed by: INTERNAL MEDICINE

## 2018-07-21 PROCEDURE — 96372 THER/PROPH/DIAG INJ SC/IM: CPT

## 2018-07-21 RX ADMIN — PEGFILGRASTIM 6 MG: 6 INJECTION SUBCUTANEOUS at 10:07

## 2018-07-21 NOTE — NURSING
Patient tolerated neulasta injection well today. No complaints at this time. Received in right upper arm. Verbalized understanding to call MD for any questions/concerns. Denied avs. Discharged home, ambulated independently.

## 2018-07-23 ENCOUNTER — LAB VISIT (OUTPATIENT)
Dept: LAB | Facility: HOSPITAL | Age: 63
End: 2018-07-23
Attending: INTERNAL MEDICINE
Payer: COMMERCIAL

## 2018-07-23 DIAGNOSIS — C85.90 T-CELL LYMPHOMA: ICD-10-CM

## 2018-07-23 DIAGNOSIS — R73.01 ABNORMAL FASTING GLUCOSE: ICD-10-CM

## 2018-07-23 LAB
ANISOCYTOSIS BLD QL SMEAR: SLIGHT
BASOPHILS # BLD AUTO: ABNORMAL K/UL
BASOPHILS NFR BLD: 0 %
DIFFERENTIAL METHOD: ABNORMAL
EOSINOPHIL # BLD AUTO: ABNORMAL K/UL
EOSINOPHIL NFR BLD: 0 %
ERYTHROCYTE [DISTWIDTH] IN BLOOD BY AUTOMATED COUNT: 12.9 %
GLUCOSE SERPL-MCNC: 101 MG/DL
HCT VFR BLD AUTO: 37.1 %
HGB BLD-MCNC: 12.4 G/DL
HYPOCHROMIA BLD QL SMEAR: ABNORMAL
IMM GRANULOCYTES # BLD AUTO: ABNORMAL K/UL
IMM GRANULOCYTES NFR BLD AUTO: ABNORMAL %
LYMPHOCYTES # BLD AUTO: ABNORMAL K/UL
LYMPHOCYTES NFR BLD: 10 %
MCH RBC QN AUTO: 30.2 PG
MCHC RBC AUTO-ENTMCNC: 33.4 G/DL
MCV RBC AUTO: 90 FL
MONOCYTES # BLD AUTO: ABNORMAL K/UL
MONOCYTES NFR BLD: 0 %
NEUTROPHILS NFR BLD: 88 %
NEUTS BAND NFR BLD MANUAL: 2 %
NRBC BLD-RTO: 0 /100 WBC
OVALOCYTES BLD QL SMEAR: ABNORMAL
PLATELET # BLD AUTO: 265 K/UL
PMV BLD AUTO: 9.3 FL
POIKILOCYTOSIS BLD QL SMEAR: SLIGHT
POLYCHROMASIA BLD QL SMEAR: ABNORMAL
RBC # BLD AUTO: 4.11 M/UL
WBC # BLD AUTO: 26.82 K/UL

## 2018-07-23 PROCEDURE — 36415 COLL VENOUS BLD VENIPUNCTURE: CPT

## 2018-07-23 PROCEDURE — 85007 BL SMEAR W/DIFF WBC COUNT: CPT

## 2018-07-23 PROCEDURE — 82947 ASSAY GLUCOSE BLOOD QUANT: CPT

## 2018-07-23 PROCEDURE — 85027 COMPLETE CBC AUTOMATED: CPT

## 2018-07-26 ENCOUNTER — LAB VISIT (OUTPATIENT)
Dept: LAB | Facility: HOSPITAL | Age: 63
End: 2018-07-26
Attending: INTERNAL MEDICINE
Payer: COMMERCIAL

## 2018-07-26 ENCOUNTER — INFUSION (OUTPATIENT)
Dept: INFUSION THERAPY | Facility: HOSPITAL | Age: 63
End: 2018-07-26
Attending: INTERNAL MEDICINE
Payer: COMMERCIAL

## 2018-07-26 DIAGNOSIS — C85.90 T-CELL LYMPHOMA: Primary | ICD-10-CM

## 2018-07-26 DIAGNOSIS — R73.01 ABNORMAL FASTING GLUCOSE: ICD-10-CM

## 2018-07-26 DIAGNOSIS — C85.90 T-CELL LYMPHOMA: ICD-10-CM

## 2018-07-26 LAB
ANISOCYTOSIS BLD QL SMEAR: SLIGHT
BASOPHILS NFR BLD: 3 %
DIFFERENTIAL METHOD: ABNORMAL
DOHLE BOD BLD QL SMEAR: PRESENT
EOSINOPHIL NFR BLD: 1 %
ERYTHROCYTE [DISTWIDTH] IN BLOOD BY AUTOMATED COUNT: 12.3 %
GLUCOSE SERPL-MCNC: 205 MG/DL
HCT VFR BLD AUTO: 36.8 %
HGB BLD-MCNC: 12.5 G/DL
IMM GRANULOCYTES # BLD AUTO: ABNORMAL K/UL
IMM GRANULOCYTES NFR BLD AUTO: ABNORMAL %
LYMPHOCYTES NFR BLD: 47 %
MCH RBC QN AUTO: 30.4 PG
MCHC RBC AUTO-ENTMCNC: 34 G/DL
MCV RBC AUTO: 90 FL
MONOCYTES NFR BLD: 3 %
NEUTROPHILS NFR BLD: 45 %
NEUTS BAND NFR BLD MANUAL: 1 %
NRBC BLD-RTO: 0 /100 WBC
PLATELET # BLD AUTO: 125 K/UL
PMV BLD AUTO: 10.3 FL
POIKILOCYTOSIS BLD QL SMEAR: SLIGHT
POLYCHROMASIA BLD QL SMEAR: ABNORMAL
RBC # BLD AUTO: 4.11 M/UL
WBC # BLD AUTO: 2.93 K/UL

## 2018-07-26 PROCEDURE — 96523 IRRIG DRUG DELIVERY DEVICE: CPT

## 2018-07-26 PROCEDURE — A4216 STERILE WATER/SALINE, 10 ML: HCPCS | Performed by: INTERNAL MEDICINE

## 2018-07-26 PROCEDURE — 82947 ASSAY GLUCOSE BLOOD QUANT: CPT

## 2018-07-26 PROCEDURE — 63600175 PHARM REV CODE 636 W HCPCS: Performed by: INTERNAL MEDICINE

## 2018-07-26 PROCEDURE — 85027 COMPLETE CBC AUTOMATED: CPT

## 2018-07-26 PROCEDURE — 36415 COLL VENOUS BLD VENIPUNCTURE: CPT

## 2018-07-26 PROCEDURE — 85007 BL SMEAR W/DIFF WBC COUNT: CPT

## 2018-07-26 PROCEDURE — 25000003 PHARM REV CODE 250: Performed by: INTERNAL MEDICINE

## 2018-07-26 RX ORDER — SODIUM CHLORIDE 0.9 % (FLUSH) 0.9 %
10 SYRINGE (ML) INJECTION
Status: CANCELLED | OUTPATIENT
Start: 2018-07-26

## 2018-07-26 RX ORDER — HEPARIN 100 UNIT/ML
500 SYRINGE INTRAVENOUS
Status: COMPLETED | OUTPATIENT
Start: 2018-07-26 | End: 2018-07-26

## 2018-07-26 RX ORDER — HEPARIN 100 UNIT/ML
500 SYRINGE INTRAVENOUS
Status: CANCELLED | OUTPATIENT
Start: 2018-07-26

## 2018-07-26 RX ORDER — SODIUM CHLORIDE 0.9 % (FLUSH) 0.9 %
10 SYRINGE (ML) INJECTION
Status: COMPLETED | OUTPATIENT
Start: 2018-07-26 | End: 2018-07-26

## 2018-07-26 RX ADMIN — SODIUM CHLORIDE, PRESERVATIVE FREE 10 ML: 5 INJECTION INTRAVENOUS at 02:07

## 2018-07-26 RX ADMIN — HEPARIN 500 UNITS: 100 SYRINGE at 02:07

## 2018-07-28 ENCOUNTER — PATIENT MESSAGE (OUTPATIENT)
Dept: HEMATOLOGY/ONCOLOGY | Facility: CLINIC | Age: 63
End: 2018-07-28

## 2018-08-02 ENCOUNTER — LAB VISIT (OUTPATIENT)
Dept: LAB | Facility: HOSPITAL | Age: 63
End: 2018-08-02
Attending: INTERNAL MEDICINE
Payer: COMMERCIAL

## 2018-08-02 ENCOUNTER — INFUSION (OUTPATIENT)
Dept: INFUSION THERAPY | Facility: HOSPITAL | Age: 63
End: 2018-08-02
Attending: INTERNAL MEDICINE
Payer: COMMERCIAL

## 2018-08-02 DIAGNOSIS — C85.90 T-CELL LYMPHOMA: Primary | ICD-10-CM

## 2018-08-02 DIAGNOSIS — R73.01 ABNORMAL FASTING GLUCOSE: ICD-10-CM

## 2018-08-02 DIAGNOSIS — C85.90 T-CELL LYMPHOMA: ICD-10-CM

## 2018-08-02 LAB
BASOPHILS # BLD AUTO: 0.05 K/UL
BASOPHILS NFR BLD: 0.9 %
DIFFERENTIAL METHOD: ABNORMAL
EOSINOPHIL # BLD AUTO: 0 K/UL
EOSINOPHIL NFR BLD: 0.2 %
ERYTHROCYTE [DISTWIDTH] IN BLOOD BY AUTOMATED COUNT: 13.4 %
GLUCOSE SERPL-MCNC: 134 MG/DL
HCT VFR BLD AUTO: 38.5 %
HGB BLD-MCNC: 12.8 G/DL
IMM GRANULOCYTES # BLD AUTO: 0.11 K/UL
IMM GRANULOCYTES NFR BLD AUTO: 2 %
LYMPHOCYTES # BLD AUTO: 1.7 K/UL
LYMPHOCYTES NFR BLD: 30.1 %
MCH RBC QN AUTO: 30 PG
MCHC RBC AUTO-ENTMCNC: 33.2 G/DL
MCV RBC AUTO: 90 FL
MONOCYTES # BLD AUTO: 0.6 K/UL
MONOCYTES NFR BLD: 11.2 %
NEUTROPHILS # BLD AUTO: 3.1 K/UL
NEUTROPHILS NFR BLD: 55.6 %
NRBC BLD-RTO: 1 /100 WBC
PLATELET # BLD AUTO: 262 K/UL
PMV BLD AUTO: 9.6 FL
RBC # BLD AUTO: 4.27 M/UL
WBC # BLD AUTO: 5.52 K/UL

## 2018-08-02 PROCEDURE — 82947 ASSAY GLUCOSE BLOOD QUANT: CPT

## 2018-08-02 PROCEDURE — A4216 STERILE WATER/SALINE, 10 ML: HCPCS | Performed by: INTERNAL MEDICINE

## 2018-08-02 PROCEDURE — 63600175 PHARM REV CODE 636 W HCPCS: Performed by: INTERNAL MEDICINE

## 2018-08-02 PROCEDURE — 85025 COMPLETE CBC W/AUTO DIFF WBC: CPT

## 2018-08-02 PROCEDURE — 25000003 PHARM REV CODE 250: Performed by: INTERNAL MEDICINE

## 2018-08-02 PROCEDURE — 36415 COLL VENOUS BLD VENIPUNCTURE: CPT

## 2018-08-02 RX ORDER — SODIUM CHLORIDE 0.9 % (FLUSH) 0.9 %
10 SYRINGE (ML) INJECTION
Status: COMPLETED | OUTPATIENT
Start: 2018-08-02 | End: 2018-08-02

## 2018-08-02 RX ORDER — HEPARIN 100 UNIT/ML
500 SYRINGE INTRAVENOUS
Status: CANCELLED | OUTPATIENT
Start: 2018-08-02

## 2018-08-02 RX ORDER — HEPARIN 100 UNIT/ML
500 SYRINGE INTRAVENOUS
Status: COMPLETED | OUTPATIENT
Start: 2018-08-02 | End: 2018-08-02

## 2018-08-02 RX ORDER — SODIUM CHLORIDE 0.9 % (FLUSH) 0.9 %
10 SYRINGE (ML) INJECTION
Status: CANCELLED | OUTPATIENT
Start: 2018-08-02

## 2018-08-02 RX ADMIN — SODIUM CHLORIDE, PRESERVATIVE FREE 10 ML: 5 INJECTION INTRAVENOUS at 01:08

## 2018-08-02 RX ADMIN — HEPARIN 500 UNITS: 100 SYRINGE at 01:08

## 2018-08-02 NOTE — NURSING
"Pt arrived for PICC line dressing change. Both lumens without blood return; pt states he has not had blood return "in a while." Message sent to clinic. Dressing changed. NAD. Discharging unassisted.  "

## 2018-08-06 ENCOUNTER — INFUSION (OUTPATIENT)
Dept: INFUSION THERAPY | Facility: HOSPITAL | Age: 63
End: 2018-08-06
Attending: INTERNAL MEDICINE
Payer: COMMERCIAL

## 2018-08-06 ENCOUNTER — LAB VISIT (OUTPATIENT)
Dept: LAB | Facility: HOSPITAL | Age: 63
End: 2018-08-06
Attending: INTERNAL MEDICINE
Payer: COMMERCIAL

## 2018-08-06 ENCOUNTER — OFFICE VISIT (OUTPATIENT)
Dept: HEMATOLOGY/ONCOLOGY | Facility: CLINIC | Age: 63
End: 2018-08-06
Payer: COMMERCIAL

## 2018-08-06 VITALS
HEIGHT: 74 IN | WEIGHT: 236.56 LBS | HEART RATE: 72 BPM | SYSTOLIC BLOOD PRESSURE: 122 MMHG | BODY MASS INDEX: 30.36 KG/M2 | DIASTOLIC BLOOD PRESSURE: 75 MMHG

## 2018-08-06 VITALS
TEMPERATURE: 98 F | BODY MASS INDEX: 30.29 KG/M2 | WEIGHT: 236 LBS | HEART RATE: 79 BPM | RESPIRATION RATE: 18 BRPM | HEIGHT: 74 IN | DIASTOLIC BLOOD PRESSURE: 84 MMHG | SYSTOLIC BLOOD PRESSURE: 130 MMHG

## 2018-08-06 DIAGNOSIS — C85.90 T-CELL LYMPHOMA: ICD-10-CM

## 2018-08-06 DIAGNOSIS — C85.90 T-CELL LYMPHOMA: Primary | ICD-10-CM

## 2018-08-06 DIAGNOSIS — D69.6 THROMBOCYTOPENIA: Primary | ICD-10-CM

## 2018-08-06 DIAGNOSIS — G62.9 PERIPHERAL POLYNEUROPATHY: ICD-10-CM

## 2018-08-06 LAB
ALBUMIN SERPL BCP-MCNC: 3.6 G/DL
ALP SERPL-CCNC: 83 U/L
ALT SERPL W/O P-5'-P-CCNC: 12 U/L
ANION GAP SERPL CALC-SCNC: 8 MMOL/L
AST SERPL-CCNC: 13 U/L
BASOPHILS # BLD AUTO: 0.07 K/UL
BASOPHILS NFR BLD: 1.1 %
BILIRUB SERPL-MCNC: 0.3 MG/DL
BUN SERPL-MCNC: 6 MG/DL
CALCIUM SERPL-MCNC: 9.6 MG/DL
CHLORIDE SERPL-SCNC: 104 MMOL/L
CO2 SERPL-SCNC: 26 MMOL/L
CREAT SERPL-MCNC: 0.9 MG/DL
DIFFERENTIAL METHOD: ABNORMAL
EOSINOPHIL # BLD AUTO: 0 K/UL
EOSINOPHIL NFR BLD: 0.2 %
ERYTHROCYTE [DISTWIDTH] IN BLOOD BY AUTOMATED COUNT: 13.8 %
EST. GFR  (AFRICAN AMERICAN): >60 ML/MIN/1.73 M^2
EST. GFR  (NON AFRICAN AMERICAN): >60 ML/MIN/1.73 M^2
GLUCOSE SERPL-MCNC: 208 MG/DL
HCT VFR BLD AUTO: 36 %
HGB BLD-MCNC: 11.9 G/DL
IMM GRANULOCYTES # BLD AUTO: 0.06 K/UL
IMM GRANULOCYTES NFR BLD AUTO: 1 %
LYMPHOCYTES # BLD AUTO: 1.6 K/UL
LYMPHOCYTES NFR BLD: 26.6 %
MCH RBC QN AUTO: 30.2 PG
MCHC RBC AUTO-ENTMCNC: 33.1 G/DL
MCV RBC AUTO: 91 FL
MONOCYTES # BLD AUTO: 0.7 K/UL
MONOCYTES NFR BLD: 12 %
NEUTROPHILS # BLD AUTO: 3.6 K/UL
NEUTROPHILS NFR BLD: 59.1 %
NRBC BLD-RTO: 0 /100 WBC
PLATELET # BLD AUTO: 440 K/UL
PMV BLD AUTO: 9.3 FL
POTASSIUM SERPL-SCNC: 4 MMOL/L
PROT SERPL-MCNC: 7.1 G/DL
RBC # BLD AUTO: 3.94 M/UL
SODIUM SERPL-SCNC: 138 MMOL/L
WBC # BLD AUTO: 6.16 K/UL

## 2018-08-06 PROCEDURE — 63600175 PHARM REV CODE 636 W HCPCS: Performed by: INTERNAL MEDICINE

## 2018-08-06 PROCEDURE — 99214 OFFICE O/P EST MOD 30 MIN: CPT | Mod: S$GLB,,, | Performed by: INTERNAL MEDICINE

## 2018-08-06 PROCEDURE — 3008F BODY MASS INDEX DOCD: CPT | Mod: CPTII,S$GLB,, | Performed by: INTERNAL MEDICINE

## 2018-08-06 PROCEDURE — 99999 PR PBB SHADOW E&M-EST. PATIENT-LVL III: CPT | Mod: PBBFAC,,, | Performed by: INTERNAL MEDICINE

## 2018-08-06 PROCEDURE — 85025 COMPLETE CBC W/AUTO DIFF WBC: CPT

## 2018-08-06 PROCEDURE — 36415 COLL VENOUS BLD VENIPUNCTURE: CPT

## 2018-08-06 PROCEDURE — 80053 COMPREHEN METABOLIC PANEL: CPT

## 2018-08-06 PROCEDURE — 96367 TX/PROPH/DG ADDL SEQ IV INF: CPT

## 2018-08-06 PROCEDURE — 25000003 PHARM REV CODE 250: Performed by: INTERNAL MEDICINE

## 2018-08-06 PROCEDURE — 96416 CHEMO PROLONG INFUSE W/PUMP: CPT

## 2018-08-06 RX ORDER — HEPARIN 100 UNIT/ML
500 SYRINGE INTRAVENOUS
Status: CANCELLED | OUTPATIENT
Start: 2018-08-06

## 2018-08-06 RX ORDER — SODIUM CHLORIDE 0.9 % (FLUSH) 0.9 %
10 SYRINGE (ML) INJECTION
Status: DISCONTINUED | OUTPATIENT
Start: 2018-08-06 | End: 2018-08-06 | Stop reason: HOSPADM

## 2018-08-06 RX ORDER — HEPARIN 100 UNIT/ML
500 SYRINGE INTRAVENOUS
Status: CANCELLED | OUTPATIENT
Start: 2018-08-08

## 2018-08-06 RX ORDER — SODIUM CHLORIDE 0.9 % (FLUSH) 0.9 %
10 SYRINGE (ML) INJECTION
Status: CANCELLED | OUTPATIENT
Start: 2018-08-09

## 2018-08-06 RX ORDER — HEPARIN 100 UNIT/ML
500 SYRINGE INTRAVENOUS
Status: DISCONTINUED | OUTPATIENT
Start: 2018-08-06 | End: 2018-08-06 | Stop reason: HOSPADM

## 2018-08-06 RX ORDER — SODIUM CHLORIDE 0.9 % (FLUSH) 0.9 %
10 SYRINGE (ML) INJECTION
Status: CANCELLED | OUTPATIENT
Start: 2018-08-08

## 2018-08-06 RX ORDER — HEPARIN 100 UNIT/ML
500 SYRINGE INTRAVENOUS
Status: CANCELLED | OUTPATIENT
Start: 2018-08-10

## 2018-08-06 RX ORDER — HEPARIN 100 UNIT/ML
500 SYRINGE INTRAVENOUS
Status: CANCELLED | OUTPATIENT
Start: 2018-08-09

## 2018-08-06 RX ORDER — SODIUM CHLORIDE 0.9 % (FLUSH) 0.9 %
10 SYRINGE (ML) INJECTION
Status: CANCELLED | OUTPATIENT
Start: 2018-08-07

## 2018-08-06 RX ORDER — HEPARIN 100 UNIT/ML
500 SYRINGE INTRAVENOUS
Status: CANCELLED | OUTPATIENT
Start: 2018-08-07

## 2018-08-06 RX ORDER — SODIUM CHLORIDE 0.9 % (FLUSH) 0.9 %
10 SYRINGE (ML) INJECTION
Status: CANCELLED | OUTPATIENT
Start: 2018-08-10

## 2018-08-06 RX ORDER — SODIUM CHLORIDE 0.9 % (FLUSH) 0.9 %
10 SYRINGE (ML) INJECTION
Status: CANCELLED | OUTPATIENT
Start: 2018-08-06

## 2018-08-06 RX ADMIN — VINCRISTINE SULFATE 24 MG: 1 INJECTION, SOLUTION INTRAVENOUS at 12:08

## 2018-08-06 RX ADMIN — DEXAMETHASONE SODIUM PHOSPHATE: 4 INJECTION, SOLUTION INTRA-ARTICULAR; INTRALESIONAL; INTRAMUSCULAR; INTRAVENOUS; SOFT TISSUE at 11:08

## 2018-08-06 NOTE — Clinical Note
Epic will not let me sign orders--I increased Cytoxan and etoposide 10% I tried to put in Mesna 400/m2 before and after Day 5 Cytoxan, but Epic would not let me sign-SO I TOOK MESNA OUT AND IT STILL WILL NOT LET ME SIGN AND I CAN NOT FIGURE OUT A WAY TO PUT IN MESNA--WHICH I HAD REQUESTED FOR CYCLES ONE AND TWO, BUT WAS NOT PUT IN  A BROWN

## 2018-08-06 NOTE — PROGRESS NOTES
Subjective:      Patient ID: Arjun Mcpherson Jr. is a 63 y.o. male.    Chief Complaint:  Other Misc (Consult )      History of Present Illness  Arjun Mcpherson Jr. presents today for Assistance with glucose management during intensive chemotherapy for lymphoma. This is his first visit with me.     Was diagnosed with pre diabetes from PCP and was started on low dose metformin.   Had stopped taking it and has restarted recently.     Results for ARJUN MCPHERSON JR. (MRN [id number]) as of 8/7/2018 08:02   Ref. Range 7/12/2018 08:23   Glucose, Fasting Latest Ref Range: 70 - 110 mg/dL 213 (H)   Hemoglobin A1C Latest Ref Range: 4.0 - 5.6 % 7.1 (H)   Estimated Avg Glucose Latest Ref Range: 68 - 131 mg/dL 157 (H)     On prednisone the week of chemotherapy 2 tablet daily 100 mg total for 5 days. Then will not take for 2 weeks.     With regards to the diabetes:    Current regimen:  Metformin 1000 mg AM & 500 mg PM     Missed doses? Yes, is not consistent     0 times a day testing  BG in clinic today 195    Eats 3 meals a day.   Snacks- fruit & ko snaps   Drinks- water     Exercise - tried to stay active     Hypoglycemic event? None   Knows how to correct with 15 grams of carbs- juice, coke, or a peppermint.      Education - last visit: none    Diabetes Management Status  Statin: Not taking  ACE/ARB: Not taking  Screening or Prevention Patient's value Goal Complete/Controlled?   HgA1C Testing and Control   Lab Results   Component Value Date    HGBA1C 7.1 (H) 07/12/2018      Annually/Less than 8% Yes   Lipid profile Most Recent Lipid Panel Health Maintenance Topic Completion: Not Found Annually No   LDL control No results found for: LDLCALC Annually/Less than 100 mg/dl  No   Nephropathy screening No results found for: LABMICR  No results found for: PROTEINUA Annually No   Blood pressure BP Readings from Last 1 Encounters:   08/07/18 120/68    Less than 140/90 Yes   Dilated retinal exam Most Recent Eye Exam Date:  Not Found Annually No   Foot exam   Most Recent Foot Exam Date: Not Found Annually No     Review of Systems   Constitutional: Positive for fatigue. Negative for unexpected weight change.   Eyes: Negative for visual disturbance.   Respiratory: Negative for cough and shortness of breath.    Cardiovascular: Negative for chest pain.   Gastrointestinal: Negative for abdominal pain.   Endocrine: Negative for cold intolerance, heat intolerance, polydipsia, polyphagia and polyuria.   Musculoskeletal: Negative for arthralgias.   Skin: Negative for wound.   Neurological: Negative for headaches.   Hematological: Does not bruise/bleed easily.   Psychiatric/Behavioral: Negative for sleep disturbance.     Objective:   Physical Exam   Constitutional: He appears well-developed.   Has infusion pump strapped on to his waist for chemotherapy    HENT:   Right Ear: External ear normal.   Left Ear: External ear normal.   Nose: Nose normal.   Hearing Normal     Neck: No tracheal deviation present. No thyromegaly present.   + parotid gland surgery scar healed    Cardiovascular: Normal rate.    No murmur heard.  No edema present   Pulmonary/Chest: Effort normal and breath sounds normal.   Abdominal: Soft. He exhibits no mass. No hernia.   Neurological: He is alert. No cranial nerve deficit or sensory deficit.   Skin: No rash noted.   No nodules.   Psychiatric: He has a normal mood and affect. Judgment normal.   Vitals reviewed.    Body mass index is 30.34 kg/m².    Lab Review:   Lab Results   Component Value Date    HGBA1C 7.1 (H) 07/12/2018     No results found for: CHOL, HDL, LDLCALC, TRIG, CHOLHDL  Lab Results   Component Value Date     08/06/2018    K 4.0 08/06/2018     08/06/2018    CO2 26 08/06/2018     (H) 08/06/2018    BUN 6 (L) 08/06/2018    CREATININE 0.9 08/06/2018    CALCIUM 9.6 08/06/2018    PROT 7.1 08/06/2018    ALBUMIN 3.6 08/06/2018    BILITOT 0.3 08/06/2018    ALKPHOS 83 08/06/2018    AST 13 08/06/2018  "   ALT 12 08/06/2018    ANIONGAP 8 08/06/2018    ESTGFRAFRICA >60.0 08/06/2018    EGFRNONAA >60.0 08/06/2018       Assessment and Plan     1. Steroid-induced hyperglycemia  insulin aspart U-100 (NOVOLOG FLEXPEN U-100 INSULIN) 100 unit/mL InPn pen    metFORMIN (GLUCOPHAGE) 500 MG tablet    pen needle, diabetic (BD ULTRA-FINE CORI PEN NEEDLE) 32 gauge x 5/32" Ndle    Hemoglobin A1c    blood-glucose meter kit    lancets Misc    blood sugar diagnostic Strp   2. T-cell lymphoma       Steroid-induced hyperglycemia  -- Reviewed goals of therapy are to get the best control we can without hypoglycemia  -- taught how to use insulin pen in clinic, return demonstration noted.   Medication changes:   Increase Metformin 1000 mg BID with meals   Start Novolog 4u AC Monday-Saturday the week of chemotherapy when he takes prednisone 100 mg   -- Reviewed patient's current insulin regimen. Clarified proper insulin dose and timing in relation to meals, etc. Insulin injection sites and proper rotation instructed.    -- Advised frequent self blood glucose monitoring.  Patient encouraged to document glucose results and bring them to every clinic visit    -- Hypoglycemia precautions discussed. Instructed on precautions before driving.    -- Call for Bg repeatedly < 90 or > 180.   -- Close adherence to lifestyle changes recommended.   -- Diabetes management guide given and discussed diet changes.   -- Periodic follow ups for eye evaluations, foot care and dental care suggested.    T-cell lymphoma  -- continue following with oncology     Follow-up in about 3 months (around 11/7/2018).  Labs prior to next appointment with me     Case discussed with Dr. Siddiqui   Recommendations were discussed with the patient in detail  The patient verbalized understanding and agrees with the plan outlined as above.     "

## 2018-08-06 NOTE — PROGRESS NOTES
HISTORY OF PRESENT ILLNESS:  Mr. Mcpherson is a 63-year-old man who is being   treated for peripheral T-cell lymphoma.  I recorded the information about his   diagnosis and initial surgery in my note of 06/19/2018.  He has not been found   to have disease outside of his neck, so his stage is IE.  A PET scan on   06/15/2018 was normal except for a lymph node just to the left of midline in   the upper neck with an SUV max of 18.4.  This node was palpable.    He has now received two cycles of EPOCH.  He again had muscle and joint aching   following Neulasta and this lasted for about four days, but it was less   troublesome than it had been before.  He continues to have some numbness in his   feet, but he has had this for 9 or 10 months and it has not changed.  He has not   had fever.  He had some pain and slight ulceration on the left lateral surface   of his posterior tongue about a week ago.  He was given a mouthwash solution to   use and that rapidly healed.  He has had no fever or sweats.    His blood counts did not decline as much after cycle 2 as they had after cycle 1.  His lowest platelet count was 125,000 and his lowest absolute granulocyte   count was 1300.    He has had frequent elevations of the blood glucose, but they have not been   severe.  He is to see an endocrinologist this week.    ADDITIONAL PAST HISTORY, SYSTEM REVIEW, SOCIAL HISTORY AND FAMILY HISTORY:  Have   been reviewed and updated in the electronic record.    PHYSICAL EXAMINATION:  GENERAL APPEARANCE:  Well-developed, well-nourished man in no distress.  EYES:  No jaundice or pallor.  MOUTH AND THROAT:  No ulceration.  No mucosal lesions.  Good dentition.  NECK:  Surgical defect in the left neck.  Left facial palsy.  I no longer feel   the submental lymph node.  LYMPH NODES:  No enlarged cervical, axillary or inguinal nodes.  CHEST AND LUNGS:  Normal respiratory effort.  Clear to auscultation and   percussion.  HEART:  Regular rate and rhythm  without murmur or gallop.  ABDOMEN:  Soft without masses or tenderness.  No hepatosplenomegaly.  EXTREMITIES:  No edema or cyanosis.  PERIPHERAL VASCULATURE:  Good pulses in the feet and ankles.  NEUROLOGIC:  Left facial nerve palsy.  Motor function is normal.  Mental status   is good.  SKIN:  No suspicious lesions in the areas examined.    LABORATORY STUDIES:  Blood counts include hemoglobin 11.9, WBC 6160 and   platelets 440,000.  Comprehensive metabolic profile is normal with the exception   of glucose 208.    IMPRESSION:  1.  Stage IE peripheral T-cell lymphoma.  2.  Mild peripheral neuropathy, stable.  3.  Probable diabetes mellitus.    RECOMMENDATIONS:  1.  He will start a third cycle of EPOCH today.  I am increasing the doses of   cyclophosphamide and etoposide by about 10%.  I am adding mesna 400 mg/m2 before   and after Cytoxan on day 5.  2.  He will continue to have a CBC and glucose every Monday and Thursday.  3.  On day 6, he will start Levaquin 750 mg daily and take it once a day for 10   days.  4.  Return visit (EPA appointment) in three weeks with CBC, CMP and appointment   for a fourth course of EPOCH.      AWB/HN  dd: 08/06/2018 10:48:56 (CDT)  td: 08/06/2018 17:06:40 (CDT)  Doc ID   #[id number]  Job ID #[id number]    CC:

## 2018-08-06 NOTE — PLAN OF CARE
Problem: Patient Care Overview  Goal: Plan of Care Review  Outcome: Ongoing (interventions implemented as appropriate)  Pt with EPOCH pump infusing to left arm picc without issue at 20 ml/hr, avs given, pt to rtc tomorrow around 1230 for pump change, no distress noted upon d/c to home

## 2018-08-06 NOTE — PLAN OF CARE
Problem: Chemotherapy Effects (Adult)  Goal: Signs and Symptoms of Listed Potential Problems Will be Absent, Minimized or Managed (Chemotherapy Effects)  Signs and symptoms of listed potential problems will be absent, minimized or managed by discharge/transition of care (reference Chemotherapy Effects (Adult) CPG).   Outcome: Ongoing (interventions implemented as appropriate)  Pt here for EPOCH D1C3, labs, hx, meds, allergies reviewed with pt., pt with no complaints at this time, reclined in chair, continue to monitor

## 2018-08-07 ENCOUNTER — INFUSION (OUTPATIENT)
Dept: INFUSION THERAPY | Facility: HOSPITAL | Age: 63
End: 2018-08-07
Attending: INTERNAL MEDICINE
Payer: COMMERCIAL

## 2018-08-07 ENCOUNTER — OFFICE VISIT (OUTPATIENT)
Dept: ENDOCRINOLOGY | Facility: CLINIC | Age: 63
End: 2018-08-07
Payer: COMMERCIAL

## 2018-08-07 VITALS — HEIGHT: 74 IN | BODY MASS INDEX: 30.33 KG/M2 | WEIGHT: 236.31 LBS

## 2018-08-07 VITALS
SYSTOLIC BLOOD PRESSURE: 120 MMHG | WEIGHT: 236.31 LBS | BODY MASS INDEX: 30.33 KG/M2 | HEIGHT: 74 IN | DIASTOLIC BLOOD PRESSURE: 68 MMHG | HEART RATE: 72 BPM

## 2018-08-07 DIAGNOSIS — T38.0X5A STEROID-INDUCED HYPERGLYCEMIA: Primary | ICD-10-CM

## 2018-08-07 DIAGNOSIS — R73.9 STEROID-INDUCED HYPERGLYCEMIA: Primary | ICD-10-CM

## 2018-08-07 DIAGNOSIS — C85.90 T-CELL LYMPHOMA: Primary | ICD-10-CM

## 2018-08-07 DIAGNOSIS — C85.90 T-CELL LYMPHOMA: ICD-10-CM

## 2018-08-07 PROCEDURE — A4216 STERILE WATER/SALINE, 10 ML: HCPCS | Performed by: INTERNAL MEDICINE

## 2018-08-07 PROCEDURE — 96521 REFILL/MAINT PORTABLE PUMP: CPT

## 2018-08-07 PROCEDURE — 3008F BODY MASS INDEX DOCD: CPT | Mod: CPTII,S$GLB,, | Performed by: NURSE PRACTITIONER

## 2018-08-07 PROCEDURE — 25000003 PHARM REV CODE 250: Performed by: INTERNAL MEDICINE

## 2018-08-07 PROCEDURE — 99999 PR PBB SHADOW E&M-EST. PATIENT-LVL III: CPT | Mod: PBBFAC,,, | Performed by: NURSE PRACTITIONER

## 2018-08-07 PROCEDURE — 63600175 PHARM REV CODE 636 W HCPCS: Performed by: INTERNAL MEDICINE

## 2018-08-07 PROCEDURE — 99204 OFFICE O/P NEW MOD 45 MIN: CPT | Mod: S$GLB,,, | Performed by: NURSE PRACTITIONER

## 2018-08-07 RX ORDER — PEN NEEDLE, DIABETIC 30 GX3/16"
NEEDLE, DISPOSABLE MISCELLANEOUS
Qty: 100 EACH | Refills: 6 | Status: SHIPPED | OUTPATIENT
Start: 2018-08-07

## 2018-08-07 RX ORDER — HEPARIN 100 UNIT/ML
500 SYRINGE INTRAVENOUS
Status: DISCONTINUED | OUTPATIENT
Start: 2018-08-07 | End: 2018-08-07 | Stop reason: HOSPADM

## 2018-08-07 RX ORDER — LANCETS
EACH MISCELLANEOUS
Qty: 100 EACH | Refills: 5 | Status: SHIPPED | OUTPATIENT
Start: 2018-08-07

## 2018-08-07 RX ORDER — INSULIN ASPART 100 [IU]/ML
4 INJECTION, SOLUTION INTRAVENOUS; SUBCUTANEOUS
Qty: 1 BOX | Refills: 11 | Status: SHIPPED | OUTPATIENT
Start: 2018-08-07 | End: 2018-10-02 | Stop reason: SDUPTHER

## 2018-08-07 RX ORDER — INSULIN PUMP SYRINGE, 3 ML
EACH MISCELLANEOUS
Qty: 1 EACH | Refills: 0 | Status: SHIPPED | OUTPATIENT
Start: 2018-08-07

## 2018-08-07 RX ORDER — METFORMIN HYDROCHLORIDE 500 MG/1
1000 TABLET ORAL 2 TIMES DAILY WITH MEALS
Qty: 360 TABLET | Refills: 3 | Status: SHIPPED | OUTPATIENT
Start: 2018-08-07

## 2018-08-07 RX ORDER — SODIUM CHLORIDE 0.9 % (FLUSH) 0.9 %
10 SYRINGE (ML) INJECTION
Status: DISCONTINUED | OUTPATIENT
Start: 2018-08-07 | End: 2018-08-07 | Stop reason: HOSPADM

## 2018-08-07 RX ORDER — HEPARIN 100 UNIT/ML
500 SYRINGE INTRAVENOUS
Status: CANCELLED | OUTPATIENT
Start: 2018-08-07

## 2018-08-07 RX ORDER — SODIUM CHLORIDE 0.9 % (FLUSH) 0.9 %
10 SYRINGE (ML) INJECTION
Status: CANCELLED | OUTPATIENT
Start: 2018-08-07

## 2018-08-07 RX ADMIN — SODIUM CHLORIDE, PRESERVATIVE FREE 10 ML: 5 INJECTION INTRAVENOUS at 01:08

## 2018-08-07 RX ADMIN — HEPARIN 500 UNITS: 100 SYRINGE at 01:08

## 2018-08-07 RX ADMIN — VINCRISTINE SULFATE 24 MG: 1 INJECTION, SOLUTION INTRAVENOUS at 01:08

## 2018-08-07 NOTE — ASSESSMENT & PLAN NOTE
-- Reviewed goals of therapy are to get the best control we can without hypoglycemia  -- taught how to use insulin pen in clinic, return demonstration noted.   Medication changes:   Increase Metformin 1000 mg BID with meals   Start Novolog 4u AC Monday-Saturday the week of chemotherapy when he takes prednisone 100 mg   -- Reviewed patient's current insulin regimen. Clarified proper insulin dose and timing in relation to meals, etc. Insulin injection sites and proper rotation instructed.    -- Advised frequent self blood glucose monitoring.  Patient encouraged to document glucose results and bring them to every clinic visit    -- Hypoglycemia precautions discussed. Instructed on precautions before driving.    -- Call for Bg repeatedly < 90 or > 180.   -- Close adherence to lifestyle changes recommended.   -- Diabetes management guide given and discussed diet changes.   -- Periodic follow ups for eye evaluations, foot care and dental care suggested.

## 2018-08-07 NOTE — LETTER
August 7, 2018      Kodi Mcgill Jr., MD  [address]  [facility] [address]           [place] [facility]  [address]  [facility] [address]  Phone: [phone]          Patient: Arjun Mcpherson Jr.   MR Number: [id number]   YOB: 1955   Date of Visit: 8/7/2018       Dear Dr. Kodi Mcgill Jr.:    Thank you for referring Arjun Mcpherson to me for evaluation. Attached you will find relevant portions of my assessment and plan of care.    If you have questions, please do not hesitate to call me. I look forward to following Arjun Mcpherson along with you.    Sincerely,    Yue Quintanilla NP    Enclosure  CC:  No Recipients    If you would like to receive this communication electronically, please contact [email or web address] or [phone] to request more information on [employer] access.    For providers and/or their staff who would like to refer a patient to [facility], please contact us through our one-stop-shop provider referral line, [facility], at [phone].    If you feel you have received this communication in error or would no longer like to receive these types of communications, please e-mail [email or web address]

## 2018-08-07 NOTE — PLAN OF CARE
Problem: Chemotherapy Effects (Adult)  Goal: Signs and Symptoms of Listed Potential Problems Will be Absent, Minimized or Managed (Chemotherapy Effects)  Signs and symptoms of listed potential problems will be absent, minimized or managed by discharge/transition of care (reference Chemotherapy Effects (Adult) CPG).   Outcome: Ongoing (interventions implemented as appropriate)  Pt here for EPOCH pump exchange, no complaints or concerns at present, reports tolerating treatment well; discussed treatment plan for today, all questions answered and pt agrees to proceed

## 2018-08-07 NOTE — PLAN OF CARE
Problem: Patient Care Overview  Goal: Plan of Care Review  Outcome: Ongoing (interventions implemented as appropriate)  Ambulatory pump connected to pt, all connections tightened, infusing w/out difficulty; pt instructed to remain well hydrated, to contact MD for any needs or concerns; pt declined printed AVS, instructed to return to center on 8/8/18 at 1300 so med can be ordered from pharmacy, pump d/c at 1330; pt verbalized understanding of all discussed and when to report next

## 2018-08-08 ENCOUNTER — INFUSION (OUTPATIENT)
Dept: INFUSION THERAPY | Facility: HOSPITAL | Age: 63
End: 2018-08-08
Attending: INTERNAL MEDICINE
Payer: COMMERCIAL

## 2018-08-08 VITALS
HEIGHT: 74 IN | SYSTOLIC BLOOD PRESSURE: 134 MMHG | HEART RATE: 81 BPM | TEMPERATURE: 99 F | DIASTOLIC BLOOD PRESSURE: 80 MMHG | BODY MASS INDEX: 30.33 KG/M2 | WEIGHT: 236.31 LBS | RESPIRATION RATE: 18 BRPM

## 2018-08-08 DIAGNOSIS — C85.90 T-CELL LYMPHOMA: Primary | ICD-10-CM

## 2018-08-08 PROCEDURE — 25000003 PHARM REV CODE 250: Performed by: INTERNAL MEDICINE

## 2018-08-08 PROCEDURE — 96416 CHEMO PROLONG INFUSE W/PUMP: CPT

## 2018-08-08 PROCEDURE — 63600175 PHARM REV CODE 636 W HCPCS: Performed by: INTERNAL MEDICINE

## 2018-08-08 RX ORDER — HEPARIN 100 UNIT/ML
500 SYRINGE INTRAVENOUS
Status: DISCONTINUED | OUTPATIENT
Start: 2018-08-08 | End: 2018-08-08 | Stop reason: HOSPADM

## 2018-08-08 RX ORDER — SODIUM CHLORIDE 0.9 % (FLUSH) 0.9 %
10 SYRINGE (ML) INJECTION
Status: DISCONTINUED | OUTPATIENT
Start: 2018-08-08 | End: 2018-08-08 | Stop reason: HOSPADM

## 2018-08-08 RX ADMIN — VINCRISTINE SULFATE 24 MG: 1 INJECTION, SOLUTION INTRAVENOUS at 01:08

## 2018-08-08 NOTE — PLAN OF CARE
Problem: Patient Care Overview  Goal: Plan of Care Review  1415 -- Patient tolerating treatment well.  Vital signs stable.  CADD pump infusing.  All clamps open and connections secured. No kinks in tubing noted.  Settings double checked by second nurse.  Instructed patient to monitor pump to make sure it says RUN and that numbers on pump are going down.  Patient instructed to call number on side of pump with any issues regarding pump.  Discharged without S/S of adverse effects.  Patient instructed to call provider with any questions or concerns. RTC tomorrow for labs and pump exchange.

## 2018-08-09 ENCOUNTER — LAB VISIT (OUTPATIENT)
Dept: LAB | Facility: HOSPITAL | Age: 63
End: 2018-08-09
Attending: INTERNAL MEDICINE
Payer: COMMERCIAL

## 2018-08-09 ENCOUNTER — INFUSION (OUTPATIENT)
Dept: INFUSION THERAPY | Facility: HOSPITAL | Age: 63
End: 2018-08-09
Attending: INTERNAL MEDICINE
Payer: COMMERCIAL

## 2018-08-09 VITALS
TEMPERATURE: 99 F | DIASTOLIC BLOOD PRESSURE: 86 MMHG | BODY MASS INDEX: 30.29 KG/M2 | HEIGHT: 74 IN | HEART RATE: 81 BPM | RESPIRATION RATE: 17 BRPM | WEIGHT: 236 LBS | SYSTOLIC BLOOD PRESSURE: 136 MMHG

## 2018-08-09 DIAGNOSIS — C85.90 T-CELL LYMPHOMA: ICD-10-CM

## 2018-08-09 DIAGNOSIS — C85.90 T-CELL LYMPHOMA: Primary | ICD-10-CM

## 2018-08-09 DIAGNOSIS — R73.01 ABNORMAL FASTING GLUCOSE: ICD-10-CM

## 2018-08-09 LAB
ANISOCYTOSIS BLD QL SMEAR: SLIGHT
BASOPHILS # BLD AUTO: 0.03 K/UL
BASOPHILS NFR BLD: 0.4 %
DIFFERENTIAL METHOD: ABNORMAL
EOSINOPHIL # BLD AUTO: 0 K/UL
EOSINOPHIL NFR BLD: 0 %
ERYTHROCYTE [DISTWIDTH] IN BLOOD BY AUTOMATED COUNT: 13.7 %
GLUCOSE SERPL-MCNC: 160 MG/DL
HCT VFR BLD AUTO: 35.1 %
HGB BLD-MCNC: 12 G/DL
HYPOCHROMIA BLD QL SMEAR: ABNORMAL
IMM GRANULOCYTES # BLD AUTO: 0.04 K/UL
IMM GRANULOCYTES NFR BLD AUTO: 0.5 %
LYMPHOCYTES # BLD AUTO: 0.5 K/UL
LYMPHOCYTES NFR BLD: 6.7 %
MCH RBC QN AUTO: 30.3 PG
MCHC RBC AUTO-ENTMCNC: 34.2 G/DL
MCV RBC AUTO: 89 FL
MONOCYTES # BLD AUTO: 0.2 K/UL
MONOCYTES NFR BLD: 2.6 %
NEUTROPHILS # BLD AUTO: 6.9 K/UL
NEUTROPHILS NFR BLD: 89.8 %
NRBC BLD-RTO: 0 /100 WBC
OVALOCYTES BLD QL SMEAR: ABNORMAL
PLATELET # BLD AUTO: 404 K/UL
PLATELET BLD QL SMEAR: ABNORMAL
PMV BLD AUTO: 9.1 FL
POIKILOCYTOSIS BLD QL SMEAR: SLIGHT
POLYCHROMASIA BLD QL SMEAR: ABNORMAL
RBC # BLD AUTO: 3.96 M/UL
WBC # BLD AUTO: 7.66 K/UL

## 2018-08-09 PROCEDURE — 63600175 PHARM REV CODE 636 W HCPCS: Performed by: INTERNAL MEDICINE

## 2018-08-09 PROCEDURE — 82947 ASSAY GLUCOSE BLOOD QUANT: CPT

## 2018-08-09 PROCEDURE — 36415 COLL VENOUS BLD VENIPUNCTURE: CPT

## 2018-08-09 PROCEDURE — 96416 CHEMO PROLONG INFUSE W/PUMP: CPT

## 2018-08-09 PROCEDURE — 85025 COMPLETE CBC W/AUTO DIFF WBC: CPT

## 2018-08-09 PROCEDURE — 25000003 PHARM REV CODE 250: Performed by: INTERNAL MEDICINE

## 2018-08-09 RX ORDER — SODIUM CHLORIDE 0.9 % (FLUSH) 0.9 %
10 SYRINGE (ML) INJECTION
Status: DISCONTINUED | OUTPATIENT
Start: 2018-08-09 | End: 2018-08-09 | Stop reason: HOSPADM

## 2018-08-09 RX ORDER — HEPARIN 100 UNIT/ML
500 SYRINGE INTRAVENOUS
Status: DISCONTINUED | OUTPATIENT
Start: 2018-08-09 | End: 2018-08-09 | Stop reason: HOSPADM

## 2018-08-09 RX ADMIN — VINCRISTINE SULFATE 24 MG: 1 INJECTION, SOLUTION INTRAVENOUS at 02:08

## 2018-08-09 NOTE — PLAN OF CARE
Problem: Chemotherapy Effects (Adult)  Goal: Signs and Symptoms of Listed Potential Problems Will be Absent, Minimized or Managed (Chemotherapy Effects)  Signs and symptoms of listed potential problems will be absent, minimized or managed by discharge/transition of care (reference Chemotherapy Effects (Adult) CPG).   Outcome: Ongoing (interventions implemented as appropriate)  Pt here for EPOCH pump change and picc line dsg change, left arm picc site cleaned per policy, site clear , dsg placed. Pt with no complaints at this time, continue to monitor

## 2018-08-09 NOTE — PLAN OF CARE
Problem: Patient Care Overview  Goal: Plan of Care Review  Outcome: Ongoing (interventions implemented as appropriate)  Pt with EPOCH infusing to left arm picc at 20 ml/hr without issue prior to d/c to home, pt to rtc 8/10/18, no distress noted upon d/c to home

## 2018-08-10 ENCOUNTER — INFUSION (OUTPATIENT)
Dept: INFUSION THERAPY | Facility: HOSPITAL | Age: 63
End: 2018-08-10
Attending: INTERNAL MEDICINE
Payer: COMMERCIAL

## 2018-08-10 VITALS
RESPIRATION RATE: 18 BRPM | DIASTOLIC BLOOD PRESSURE: 83 MMHG | TEMPERATURE: 98 F | HEART RATE: 91 BPM | SYSTOLIC BLOOD PRESSURE: 132 MMHG

## 2018-08-10 DIAGNOSIS — C85.90 T-CELL LYMPHOMA: Primary | ICD-10-CM

## 2018-08-10 PROCEDURE — 96367 TX/PROPH/DG ADDL SEQ IV INF: CPT

## 2018-08-10 PROCEDURE — 63600175 PHARM REV CODE 636 W HCPCS: Performed by: INTERNAL MEDICINE

## 2018-08-10 PROCEDURE — 96375 TX/PRO/DX INJ NEW DRUG ADDON: CPT

## 2018-08-10 PROCEDURE — 25000003 PHARM REV CODE 250: Performed by: INTERNAL MEDICINE

## 2018-08-10 RX ORDER — HEPARIN 100 UNIT/ML
500 SYRINGE INTRAVENOUS
Status: DISCONTINUED | OUTPATIENT
Start: 2018-08-10 | End: 2018-08-10 | Stop reason: HOSPADM

## 2018-08-10 RX ORDER — SODIUM CHLORIDE 0.9 % (FLUSH) 0.9 %
10 SYRINGE (ML) INJECTION
Status: DISCONTINUED | OUTPATIENT
Start: 2018-08-10 | End: 2018-08-10 | Stop reason: HOSPADM

## 2018-08-10 RX ADMIN — CYCLOPHOSPHAMIDE 1970 MG: 1 INJECTION, POWDER, FOR SOLUTION INTRAVENOUS; ORAL at 04:08

## 2018-08-10 RX ADMIN — MESNA 956 MG: 100 INJECTION, SOLUTION INTRAVENOUS at 04:08

## 2018-08-10 RX ADMIN — HEPARIN 500 UNITS: 100 SYRINGE at 05:08

## 2018-08-10 RX ADMIN — DEXAMETHASONE SODIUM PHOSPHATE: 4 INJECTION, SOLUTION INTRA-ARTICULAR; INTRALESIONAL; INTRAMUSCULAR; INTRAVENOUS; SOFT TISSUE at 03:08

## 2018-08-10 RX ADMIN — MESNA 956 MG: 100 INJECTION, SOLUTION INTRAVENOUS at 05:08

## 2018-08-10 NOTE — PLAN OF CARE
Problem: Patient Care Overview  Goal: Discharge Needs Assessment  Outcome: Ongoing (interventions implemented as appropriate)  Pt tolerated cytoxan well no adverse reaction EPOCH pump d/c'd, PICC line flushed capped hep locked, leaves clinic ambulatory accompanied by brother EVA rod instructed to contact MD office with any questions or concerns.

## 2018-08-11 ENCOUNTER — INFUSION (OUTPATIENT)
Dept: INFUSION THERAPY | Facility: HOSPITAL | Age: 63
End: 2018-08-11
Attending: INTERNAL MEDICINE
Payer: COMMERCIAL

## 2018-08-11 DIAGNOSIS — C85.90 T-CELL LYMPHOMA: Primary | ICD-10-CM

## 2018-08-11 PROCEDURE — 63600175 PHARM REV CODE 636 W HCPCS: Mod: JG | Performed by: INTERNAL MEDICINE

## 2018-08-11 PROCEDURE — 96372 THER/PROPH/DIAG INJ SC/IM: CPT

## 2018-08-11 RX ADMIN — PEGFILGRASTIM 6 MG: 6 INJECTION SUBCUTANEOUS at 11:08

## 2018-08-13 ENCOUNTER — LAB VISIT (OUTPATIENT)
Dept: LAB | Facility: HOSPITAL | Age: 63
End: 2018-08-13
Attending: INTERNAL MEDICINE
Payer: COMMERCIAL

## 2018-08-13 DIAGNOSIS — C85.90 T-CELL LYMPHOMA: ICD-10-CM

## 2018-08-13 DIAGNOSIS — R73.01 ABNORMAL FASTING GLUCOSE: ICD-10-CM

## 2018-08-13 LAB
ANISOCYTOSIS BLD QL SMEAR: SLIGHT
BASOPHILS NFR BLD: 0 %
DIFFERENTIAL METHOD: ABNORMAL
EOSINOPHIL NFR BLD: 0 %
ERYTHROCYTE [DISTWIDTH] IN BLOOD BY AUTOMATED COUNT: 14 %
GLUCOSE SERPL-MCNC: 114 MG/DL
HCT VFR BLD AUTO: 34.6 %
HGB BLD-MCNC: 11.3 G/DL
IMM GRANULOCYTES # BLD AUTO: ABNORMAL K/UL
IMM GRANULOCYTES NFR BLD AUTO: ABNORMAL %
LYMPHOCYTES NFR BLD: 7 %
MCH RBC QN AUTO: 29.6 PG
MCHC RBC AUTO-ENTMCNC: 32.7 G/DL
MCV RBC AUTO: 91 FL
MONOCYTES NFR BLD: 0 %
NEUTROPHILS NFR BLD: 93 %
NRBC BLD-RTO: 0 /100 WBC
PLATELET # BLD AUTO: 252 K/UL
PLATELET BLD QL SMEAR: ABNORMAL
PMV BLD AUTO: 9.1 FL
RBC # BLD AUTO: 3.82 M/UL
WBC # BLD AUTO: 28.79 K/UL

## 2018-08-13 PROCEDURE — 85007 BL SMEAR W/DIFF WBC COUNT: CPT

## 2018-08-13 PROCEDURE — 85027 COMPLETE CBC AUTOMATED: CPT

## 2018-08-13 PROCEDURE — 82947 ASSAY GLUCOSE BLOOD QUANT: CPT

## 2018-08-13 PROCEDURE — 36415 COLL VENOUS BLD VENIPUNCTURE: CPT

## 2018-08-16 ENCOUNTER — PATIENT MESSAGE (OUTPATIENT)
Dept: ENDOCRINOLOGY | Facility: CLINIC | Age: 63
End: 2018-08-16

## 2018-08-16 ENCOUNTER — LAB VISIT (OUTPATIENT)
Dept: LAB | Facility: HOSPITAL | Age: 63
End: 2018-08-16
Attending: INTERNAL MEDICINE
Payer: COMMERCIAL

## 2018-08-16 ENCOUNTER — INFUSION (OUTPATIENT)
Dept: INFUSION THERAPY | Facility: HOSPITAL | Age: 63
End: 2018-08-16
Attending: INTERNAL MEDICINE
Payer: COMMERCIAL

## 2018-08-16 DIAGNOSIS — C85.90 T-CELL LYMPHOMA: Primary | ICD-10-CM

## 2018-08-16 DIAGNOSIS — R73.01 ABNORMAL FASTING GLUCOSE: ICD-10-CM

## 2018-08-16 DIAGNOSIS — C85.90 T-CELL LYMPHOMA: ICD-10-CM

## 2018-08-16 LAB
ANISOCYTOSIS BLD QL SMEAR: SLIGHT
BASOPHILS # BLD AUTO: 0.08 K/UL
BASOPHILS NFR BLD: 2.1 %
DIFFERENTIAL METHOD: ABNORMAL
EOSINOPHIL # BLD AUTO: 0 K/UL
EOSINOPHIL NFR BLD: 0 %
ERYTHROCYTE [DISTWIDTH] IN BLOOD BY AUTOMATED COUNT: 13.8 %
GLUCOSE SERPL-MCNC: 113 MG/DL
HCT VFR BLD AUTO: 35.9 %
HGB BLD-MCNC: 11.9 G/DL
HYPOCHROMIA BLD QL SMEAR: ABNORMAL
IMM GRANULOCYTES # BLD AUTO: 0.1 K/UL
IMM GRANULOCYTES NFR BLD AUTO: 2.7 %
LYMPHOCYTES # BLD AUTO: 0.9 K/UL
LYMPHOCYTES NFR BLD: 24.3 %
MCH RBC QN AUTO: 30.2 PG
MCHC RBC AUTO-ENTMCNC: 33.1 G/DL
MCV RBC AUTO: 91 FL
MONOCYTES # BLD AUTO: 0.1 K/UL
MONOCYTES NFR BLD: 2.9 %
NEUTROPHILS # BLD AUTO: 2.6 K/UL
NEUTROPHILS NFR BLD: 68 %
NRBC BLD-RTO: 0 /100 WBC
OVALOCYTES BLD QL SMEAR: ABNORMAL
PLATELET # BLD AUTO: 117 K/UL
PLATELET BLD QL SMEAR: ABNORMAL
PMV BLD AUTO: 10 FL
POIKILOCYTOSIS BLD QL SMEAR: SLIGHT
POLYCHROMASIA BLD QL SMEAR: ABNORMAL
RBC # BLD AUTO: 3.94 M/UL
WBC # BLD AUTO: 3.75 K/UL

## 2018-08-16 PROCEDURE — 82947 ASSAY GLUCOSE BLOOD QUANT: CPT

## 2018-08-16 PROCEDURE — 25000003 PHARM REV CODE 250: Performed by: INTERNAL MEDICINE

## 2018-08-16 PROCEDURE — A4216 STERILE WATER/SALINE, 10 ML: HCPCS | Performed by: INTERNAL MEDICINE

## 2018-08-16 PROCEDURE — 36415 COLL VENOUS BLD VENIPUNCTURE: CPT

## 2018-08-16 PROCEDURE — 63600175 PHARM REV CODE 636 W HCPCS: Performed by: INTERNAL MEDICINE

## 2018-08-16 PROCEDURE — 85025 COMPLETE CBC W/AUTO DIFF WBC: CPT

## 2018-08-16 PROCEDURE — 96523 IRRIG DRUG DELIVERY DEVICE: CPT

## 2018-08-16 RX ORDER — HEPARIN 100 UNIT/ML
500 SYRINGE INTRAVENOUS
Status: COMPLETED | OUTPATIENT
Start: 2018-08-16 | End: 2018-08-16

## 2018-08-16 RX ORDER — SODIUM CHLORIDE 0.9 % (FLUSH) 0.9 %
10 SYRINGE (ML) INJECTION
Status: COMPLETED | OUTPATIENT
Start: 2018-08-16 | End: 2018-08-16

## 2018-08-16 RX ORDER — SODIUM CHLORIDE 0.9 % (FLUSH) 0.9 %
10 SYRINGE (ML) INJECTION
Status: CANCELLED | OUTPATIENT
Start: 2018-08-16

## 2018-08-16 RX ORDER — HEPARIN 100 UNIT/ML
500 SYRINGE INTRAVENOUS
Status: CANCELLED | OUTPATIENT
Start: 2018-08-16

## 2018-08-16 RX ADMIN — SODIUM CHLORIDE, PRESERVATIVE FREE 10 ML: 5 INJECTION INTRAVENOUS at 12:08

## 2018-08-16 RX ADMIN — HEPARIN 500 UNITS: 100 SYRINGE at 12:08

## 2018-08-20 ENCOUNTER — LAB VISIT (OUTPATIENT)
Dept: LAB | Facility: HOSPITAL | Age: 63
End: 2018-08-20
Attending: INTERNAL MEDICINE
Payer: COMMERCIAL

## 2018-08-20 DIAGNOSIS — C85.90 T-CELL LYMPHOMA: ICD-10-CM

## 2018-08-20 DIAGNOSIS — R73.01 ABNORMAL FASTING GLUCOSE: ICD-10-CM

## 2018-08-20 LAB
ANISOCYTOSIS BLD QL SMEAR: SLIGHT
BASOPHILS # BLD AUTO: 0.09 K/UL
BASOPHILS NFR BLD: 1.8 %
DIFFERENTIAL METHOD: ABNORMAL
EOSINOPHIL # BLD AUTO: 0 K/UL
EOSINOPHIL NFR BLD: 0 %
ERYTHROCYTE [DISTWIDTH] IN BLOOD BY AUTOMATED COUNT: 14.9 %
GLUCOSE SERPL-MCNC: 146 MG/DL
HCT VFR BLD AUTO: 36.5 %
HGB BLD-MCNC: 11.7 G/DL
IMM GRANULOCYTES # BLD AUTO: 0.2 K/UL
IMM GRANULOCYTES NFR BLD AUTO: 4 %
LYMPHOCYTES # BLD AUTO: 1.5 K/UL
LYMPHOCYTES NFR BLD: 29.4 %
MCH RBC QN AUTO: 29.8 PG
MCHC RBC AUTO-ENTMCNC: 32.1 G/DL
MCV RBC AUTO: 93 FL
MONOCYTES # BLD AUTO: 0.8 K/UL
MONOCYTES NFR BLD: 14.9 %
NEUTROPHILS # BLD AUTO: 2.5 K/UL
NEUTROPHILS NFR BLD: 49.9 %
NRBC BLD-RTO: 2 /100 WBC
PLATELET # BLD AUTO: 165 K/UL
PLATELET BLD QL SMEAR: ABNORMAL
PMV BLD AUTO: 10.7 FL
POLYCHROMASIA BLD QL SMEAR: ABNORMAL
RBC # BLD AUTO: 3.92 M/UL
WBC # BLD AUTO: 5.04 K/UL

## 2018-08-20 PROCEDURE — 85025 COMPLETE CBC W/AUTO DIFF WBC: CPT

## 2018-08-20 PROCEDURE — 82947 ASSAY GLUCOSE BLOOD QUANT: CPT

## 2018-08-20 PROCEDURE — 36415 COLL VENOUS BLD VENIPUNCTURE: CPT

## 2018-08-23 ENCOUNTER — LAB VISIT (OUTPATIENT)
Dept: LAB | Facility: HOSPITAL | Age: 63
End: 2018-08-23
Attending: INTERNAL MEDICINE
Payer: COMMERCIAL

## 2018-08-23 ENCOUNTER — INFUSION (OUTPATIENT)
Dept: INFUSION THERAPY | Facility: HOSPITAL | Age: 63
End: 2018-08-23
Attending: INTERNAL MEDICINE
Payer: COMMERCIAL

## 2018-08-23 DIAGNOSIS — R73.01 ABNORMAL FASTING GLUCOSE: ICD-10-CM

## 2018-08-23 DIAGNOSIS — C85.90 T-CELL LYMPHOMA: ICD-10-CM

## 2018-08-23 DIAGNOSIS — C85.90 T-CELL LYMPHOMA: Primary | ICD-10-CM

## 2018-08-23 LAB
BASOPHILS # BLD AUTO: 0.07 K/UL
BASOPHILS NFR BLD: 0.9 %
DIFFERENTIAL METHOD: ABNORMAL
EOSINOPHIL # BLD AUTO: 0 K/UL
EOSINOPHIL NFR BLD: 0.1 %
ERYTHROCYTE [DISTWIDTH] IN BLOOD BY AUTOMATED COUNT: 15 %
GLUCOSE SERPL-MCNC: 141 MG/DL
HCT VFR BLD AUTO: 36.6 %
HGB BLD-MCNC: 11.9 G/DL
IMM GRANULOCYTES # BLD AUTO: 0.2 K/UL
IMM GRANULOCYTES NFR BLD AUTO: 2.4 %
LYMPHOCYTES # BLD AUTO: 1.7 K/UL
LYMPHOCYTES NFR BLD: 20.9 %
MCH RBC QN AUTO: 30.1 PG
MCHC RBC AUTO-ENTMCNC: 32.5 G/DL
MCV RBC AUTO: 92 FL
MONOCYTES # BLD AUTO: 1 K/UL
MONOCYTES NFR BLD: 11.5 %
NEUTROPHILS # BLD AUTO: 5.3 K/UL
NEUTROPHILS NFR BLD: 64.2 %
NRBC BLD-RTO: 1 /100 WBC
PLATELET # BLD AUTO: 291 K/UL
PMV BLD AUTO: 9.7 FL
RBC # BLD AUTO: 3.96 M/UL
WBC # BLD AUTO: 8.23 K/UL

## 2018-08-23 PROCEDURE — 63600175 PHARM REV CODE 636 W HCPCS: Performed by: INTERNAL MEDICINE

## 2018-08-23 PROCEDURE — 96523 IRRIG DRUG DELIVERY DEVICE: CPT

## 2018-08-23 PROCEDURE — 82947 ASSAY GLUCOSE BLOOD QUANT: CPT

## 2018-08-23 PROCEDURE — 25000003 PHARM REV CODE 250: Performed by: INTERNAL MEDICINE

## 2018-08-23 PROCEDURE — 36415 COLL VENOUS BLD VENIPUNCTURE: CPT

## 2018-08-23 PROCEDURE — A4216 STERILE WATER/SALINE, 10 ML: HCPCS | Performed by: INTERNAL MEDICINE

## 2018-08-23 PROCEDURE — 85025 COMPLETE CBC W/AUTO DIFF WBC: CPT

## 2018-08-23 RX ORDER — HEPARIN 100 UNIT/ML
500 SYRINGE INTRAVENOUS
Status: COMPLETED | OUTPATIENT
Start: 2018-08-23 | End: 2018-08-23

## 2018-08-23 RX ORDER — HEPARIN 100 UNIT/ML
500 SYRINGE INTRAVENOUS
Status: CANCELLED | OUTPATIENT
Start: 2018-08-23

## 2018-08-23 RX ORDER — SODIUM CHLORIDE 0.9 % (FLUSH) 0.9 %
10 SYRINGE (ML) INJECTION
Status: COMPLETED | OUTPATIENT
Start: 2018-08-23 | End: 2018-08-23

## 2018-08-23 RX ORDER — SODIUM CHLORIDE 0.9 % (FLUSH) 0.9 %
10 SYRINGE (ML) INJECTION
Status: CANCELLED | OUTPATIENT
Start: 2018-08-23

## 2018-08-23 RX ADMIN — SODIUM CHLORIDE, PRESERVATIVE FREE 10 ML: 5 INJECTION INTRAVENOUS at 12:08

## 2018-08-23 RX ADMIN — HEPARIN 500 UNITS: 100 SYRINGE at 12:08

## 2018-08-27 ENCOUNTER — OFFICE VISIT (OUTPATIENT)
Dept: HEMATOLOGY/ONCOLOGY | Facility: CLINIC | Age: 63
End: 2018-08-27
Payer: COMMERCIAL

## 2018-08-27 ENCOUNTER — LAB VISIT (OUTPATIENT)
Dept: LAB | Facility: HOSPITAL | Age: 63
End: 2018-08-27
Attending: INTERNAL MEDICINE
Payer: COMMERCIAL

## 2018-08-27 ENCOUNTER — INFUSION (OUTPATIENT)
Dept: INFUSION THERAPY | Facility: HOSPITAL | Age: 63
End: 2018-08-27
Attending: INTERNAL MEDICINE
Payer: COMMERCIAL

## 2018-08-27 VITALS
HEART RATE: 76 BPM | DIASTOLIC BLOOD PRESSURE: 72 MMHG | HEIGHT: 74 IN | BODY MASS INDEX: 29.71 KG/M2 | WEIGHT: 231.5 LBS | SYSTOLIC BLOOD PRESSURE: 126 MMHG

## 2018-08-27 VITALS
DIASTOLIC BLOOD PRESSURE: 79 MMHG | TEMPERATURE: 98 F | RESPIRATION RATE: 18 BRPM | HEART RATE: 77 BPM | SYSTOLIC BLOOD PRESSURE: 139 MMHG

## 2018-08-27 DIAGNOSIS — G60.9 IDIOPATHIC PERIPHERAL NEUROPATHY: ICD-10-CM

## 2018-08-27 DIAGNOSIS — C85.90 T-CELL LYMPHOMA: ICD-10-CM

## 2018-08-27 DIAGNOSIS — C85.90 T-CELL LYMPHOMA: Primary | ICD-10-CM

## 2018-08-27 DIAGNOSIS — R73.01 ABNORMAL FASTING GLUCOSE: ICD-10-CM

## 2018-08-27 LAB
ALBUMIN SERPL BCP-MCNC: 3.6 G/DL
ALP SERPL-CCNC: 82 U/L
ALT SERPL W/O P-5'-P-CCNC: 5 U/L
ANION GAP SERPL CALC-SCNC: 8 MMOL/L
AST SERPL-CCNC: 11 U/L
BASOPHILS # BLD AUTO: 0.07 K/UL
BASOPHILS NFR BLD: 0.9 %
BILIRUB SERPL-MCNC: 0.3 MG/DL
BUN SERPL-MCNC: 6 MG/DL
CALCIUM SERPL-MCNC: 9.5 MG/DL
CHLORIDE SERPL-SCNC: 106 MMOL/L
CO2 SERPL-SCNC: 26 MMOL/L
CREAT SERPL-MCNC: 0.8 MG/DL
DIFFERENTIAL METHOD: ABNORMAL
EOSINOPHIL # BLD AUTO: 0 K/UL
EOSINOPHIL NFR BLD: 0.3 %
ERYTHROCYTE [DISTWIDTH] IN BLOOD BY AUTOMATED COUNT: 15.3 %
EST. GFR  (AFRICAN AMERICAN): >60 ML/MIN/1.73 M^2
EST. GFR  (NON AFRICAN AMERICAN): >60 ML/MIN/1.73 M^2
GLUCOSE SERPL-MCNC: 173 MG/DL
GLUCOSE SERPL-MCNC: 173 MG/DL
HCT VFR BLD AUTO: 35.3 %
HGB BLD-MCNC: 11.5 G/DL
IMM GRANULOCYTES # BLD AUTO: 0.12 K/UL
IMM GRANULOCYTES NFR BLD AUTO: 1.5 %
LYMPHOCYTES # BLD AUTO: 1.8 K/UL
LYMPHOCYTES NFR BLD: 22.9 %
MCH RBC QN AUTO: 30.3 PG
MCHC RBC AUTO-ENTMCNC: 32.6 G/DL
MCV RBC AUTO: 93 FL
MONOCYTES # BLD AUTO: 1 K/UL
MONOCYTES NFR BLD: 12.7 %
NEUTROPHILS # BLD AUTO: 4.9 K/UL
NEUTROPHILS NFR BLD: 61.7 %
NRBC BLD-RTO: 1 /100 WBC
PLATELET # BLD AUTO: 395 K/UL
PMV BLD AUTO: 9.1 FL
POTASSIUM SERPL-SCNC: 4.1 MMOL/L
PROT SERPL-MCNC: 6.7 G/DL
RBC # BLD AUTO: 3.79 M/UL
SODIUM SERPL-SCNC: 140 MMOL/L
WBC # BLD AUTO: 7.94 K/UL

## 2018-08-27 PROCEDURE — 85025 COMPLETE CBC W/AUTO DIFF WBC: CPT

## 2018-08-27 PROCEDURE — 96416 CHEMO PROLONG INFUSE W/PUMP: CPT

## 2018-08-27 PROCEDURE — 63600175 PHARM REV CODE 636 W HCPCS: Mod: JG | Performed by: INTERNAL MEDICINE

## 2018-08-27 PROCEDURE — 96367 TX/PROPH/DG ADDL SEQ IV INF: CPT

## 2018-08-27 PROCEDURE — 99214 OFFICE O/P EST MOD 30 MIN: CPT | Mod: S$GLB,,, | Performed by: INTERNAL MEDICINE

## 2018-08-27 PROCEDURE — 36415 COLL VENOUS BLD VENIPUNCTURE: CPT

## 2018-08-27 PROCEDURE — 80053 COMPREHEN METABOLIC PANEL: CPT

## 2018-08-27 PROCEDURE — 99999 PR PBB SHADOW E&M-EST. PATIENT-LVL III: CPT | Mod: PBBFAC,,, | Performed by: INTERNAL MEDICINE

## 2018-08-27 PROCEDURE — 82947 ASSAY GLUCOSE BLOOD QUANT: CPT

## 2018-08-27 PROCEDURE — 3008F BODY MASS INDEX DOCD: CPT | Mod: CPTII,S$GLB,, | Performed by: INTERNAL MEDICINE

## 2018-08-27 PROCEDURE — 25000003 PHARM REV CODE 250: Performed by: INTERNAL MEDICINE

## 2018-08-27 RX ORDER — SODIUM CHLORIDE 0.9 % (FLUSH) 0.9 %
10 SYRINGE (ML) INJECTION
Status: CANCELLED | OUTPATIENT
Start: 2018-08-29

## 2018-08-27 RX ORDER — HEPARIN 100 UNIT/ML
500 SYRINGE INTRAVENOUS
Status: CANCELLED | OUTPATIENT
Start: 2018-08-31

## 2018-08-27 RX ORDER — SODIUM CHLORIDE 0.9 % (FLUSH) 0.9 %
10 SYRINGE (ML) INJECTION
Status: CANCELLED | OUTPATIENT
Start: 2018-08-31

## 2018-08-27 RX ORDER — SODIUM CHLORIDE 0.9 % (FLUSH) 0.9 %
10 SYRINGE (ML) INJECTION
Status: CANCELLED | OUTPATIENT
Start: 2018-08-28

## 2018-08-27 RX ORDER — HEPARIN 100 UNIT/ML
500 SYRINGE INTRAVENOUS
Status: CANCELLED | OUTPATIENT
Start: 2018-08-28

## 2018-08-27 RX ORDER — HEPARIN 100 UNIT/ML
500 SYRINGE INTRAVENOUS
Status: DISCONTINUED | OUTPATIENT
Start: 2018-08-27 | End: 2018-08-27 | Stop reason: HOSPADM

## 2018-08-27 RX ORDER — HEPARIN 100 UNIT/ML
500 SYRINGE INTRAVENOUS
Status: CANCELLED | OUTPATIENT
Start: 2018-08-30

## 2018-08-27 RX ORDER — SODIUM CHLORIDE 0.9 % (FLUSH) 0.9 %
10 SYRINGE (ML) INJECTION
Status: CANCELLED | OUTPATIENT
Start: 2018-08-30

## 2018-08-27 RX ORDER — SODIUM CHLORIDE 0.9 % (FLUSH) 0.9 %
10 SYRINGE (ML) INJECTION
Status: DISCONTINUED | OUTPATIENT
Start: 2018-08-27 | End: 2018-08-27 | Stop reason: HOSPADM

## 2018-08-27 RX ORDER — HEPARIN 100 UNIT/ML
500 SYRINGE INTRAVENOUS
Status: CANCELLED | OUTPATIENT
Start: 2018-08-29

## 2018-08-27 RX ORDER — HEPARIN 100 UNIT/ML
500 SYRINGE INTRAVENOUS
Status: CANCELLED | OUTPATIENT
Start: 2018-08-27

## 2018-08-27 RX ORDER — SODIUM CHLORIDE 0.9 % (FLUSH) 0.9 %
10 SYRINGE (ML) INJECTION
Status: CANCELLED | OUTPATIENT
Start: 2018-08-27

## 2018-08-27 RX ADMIN — VINCRISTINE SULFATE 24 MG: 1 INJECTION, SOLUTION INTRAVENOUS at 12:08

## 2018-08-27 RX ADMIN — HEPARIN 500 UNITS: 100 SYRINGE at 10:08

## 2018-08-27 RX ADMIN — DEXAMETHASONE SODIUM PHOSPHATE: 4 INJECTION, SOLUTION INTRA-ARTICULAR; INTRALESIONAL; INTRAMUSCULAR; INTRAVENOUS; SOFT TISSUE at 10:08

## 2018-08-27 NOTE — PROGRESS NOTES
HISTORY OF PRESENT ILLNESS:  Mr. Mcpherson is a 63-year-old man who is being   treated for peripheral T-cell lymphoma.  In my note of 06/19/2018, I recorded   the history of his diagnosis and initial surgery.  All disease that has been   detected was in his neck, so his stage is IE.  A PET scan in June 2018 was   normal except for a lymph node just to the left of midline in the upper neck   with an SUV max of 18.4.  This node was palpable, but it disappeared after the   first course of therapy.    He has now received three cycles of EPOCH.  He had muscle and joint aching   following Neulasta and with the last course; these symptoms lasted for about   eight days.  He occasionally takes analgesic medication.  He had some numbness   in his feet prior to starting therapy and it has not changed.  He reports no   symptoms of cardiac disease.  He did not have mucositis with the last treatment.    His blood count pattern has been unusual since his neutrophil count and platelet   count did not decline as much after cycles 2 and 3 as they did after cycle 1.    I increased the dosage of cyclophosphamide and etoposide approximately 10% and   added mesna at the start of his third cycle.    He has had some elevation of the blood glucoses, but they have not been severe   and he takes insulin as needed.  He has not had fevers or sweats.  He has not  noted any new masses.    ADDITIONAL PAST HISTORY, SYSTEM REVIEW, SOCIAL HISTORY AND FAMILY HISTORY:  Have   been reviewed and updated in the electronic record.    PHYSICAL EXAMINATION:  GENERAL APPEARANCE:  Well-developed, well-nourished man in no distress.  EYES:  No jaundice or pallor.  MOUTH AND THROAT:  No mucosal lesions.  Good dentition.  NECK:  Surgical defect in the left neck.  Left facial palsy.  No palpable lymph   nodes.  LYMPH NODES:  There are also no palpable lymph nodes in the axillae or inguinal   regions.  CHEST AND LUNGS:  Normal respiratory effort.  Clear to  auscultation and   percussion.  HEART:  Regular rate and rhythm without murmur or gallop.  ABDOMEN:  Soft without masses or tenderness.  No hepatosplenomegaly.  EXTREMITIES:  No edema or cyanosis.  PERIPHERAL VASCULATURE:  Good pulses of the feet and ankles.  NEUROLOGIC:  Left facial nerve palsy.  Motor function is normal.  Mental status   is good.  SKIN:  No suspicious lesions in the areas examined.    LABORATORY STUDIES:  Blood counts today include hemoglobin 11.5, platelets   395,000 and WBC 7940.  The platelet count harjinder was 117,000.  The neutrophil   harjinder was 2500.    IMPRESSION:  1.  Stage IE peripheral T-cell lymphoma.  2.  Mild stable peripheral neuropathy.  3.  Probable diabetes mellitus.    RECOMMENDATIONS:  1.  He will begin a fourth course of EPOCH today.  I am again increasing the   doses of cyclophosphamide and etoposide by approximately 10% and making a slight   increase in the dose of mesna.  2.  He will continue to have a CBC every Monday and Thursday.  3.  On day 6, he will start Levaquin 750 mg and take it daily for 10 days.  4.  Return visit (EPA appointment) in three weeks with CBC, CMP, 2D   echocardiogram and appointment for a fifth course of EPOCH.  5.  He has already seen Dr. Miller in Radiation therapy.  I anticipate obtaining   a PET scan after a full six courses of chemotherapy and then ask Dr. Miller to see   him for an opinion in regard to radiation.      AWB/HN  dd: 08/27/2018 08:54:55 (CDT)  td: 08/27/2018 13:19:40 (CDT)  Doc ID   #[id number]  Job ID #[id number]    CC:

## 2018-08-27 NOTE — PLAN OF CARE
Problem: Chemotherapy Effects (Adult)  Goal: Signs and Symptoms of Listed Potential Problems Will be Absent, Minimized or Managed (Chemotherapy Effects)  Signs and symptoms of listed potential problems will be absent, minimized or managed by discharge/transition of care (reference Chemotherapy Effects (Adult) CPG).   Outcome: Ongoing (interventions implemented as appropriate)  Pt. Tolerated pre-meds without any complications. V/S stable. Placed on ambulatory CAD pump. Port site infusing without difficulty and secured. Pt. Reminded check pump at least twice a day to make sure it was running properly. Pt will RTC tomorrow about 12:00 for pump exchange. No questions or concerns at this time.

## 2018-08-28 ENCOUNTER — INFUSION (OUTPATIENT)
Dept: INFUSION THERAPY | Facility: HOSPITAL | Age: 63
End: 2018-08-28
Attending: INTERNAL MEDICINE
Payer: COMMERCIAL

## 2018-08-28 VITALS
WEIGHT: 231.5 LBS | RESPIRATION RATE: 18 BRPM | HEIGHT: 74 IN | DIASTOLIC BLOOD PRESSURE: 84 MMHG | HEART RATE: 90 BPM | SYSTOLIC BLOOD PRESSURE: 132 MMHG | BODY MASS INDEX: 29.71 KG/M2 | TEMPERATURE: 99 F

## 2018-08-28 DIAGNOSIS — C85.90 T-CELL LYMPHOMA: Primary | ICD-10-CM

## 2018-08-28 PROCEDURE — A4216 STERILE WATER/SALINE, 10 ML: HCPCS | Performed by: INTERNAL MEDICINE

## 2018-08-28 PROCEDURE — 63600175 PHARM REV CODE 636 W HCPCS: Performed by: INTERNAL MEDICINE

## 2018-08-28 PROCEDURE — 25000003 PHARM REV CODE 250: Performed by: INTERNAL MEDICINE

## 2018-08-28 PROCEDURE — 96521 REFILL/MAINT PORTABLE PUMP: CPT

## 2018-08-28 RX ORDER — SODIUM CHLORIDE 0.9 % (FLUSH) 0.9 %
10 SYRINGE (ML) INJECTION
Status: DISCONTINUED | OUTPATIENT
Start: 2018-08-28 | End: 2018-08-28 | Stop reason: HOSPADM

## 2018-08-28 RX ORDER — HEPARIN 100 UNIT/ML
500 SYRINGE INTRAVENOUS
Status: DISCONTINUED | OUTPATIENT
Start: 2018-08-28 | End: 2018-08-28 | Stop reason: HOSPADM

## 2018-08-28 RX ADMIN — VINCRISTINE SULFATE 24 MG: 1 INJECTION, SOLUTION INTRAVENOUS at 12:08

## 2018-08-28 RX ADMIN — Medication 10 ML: at 12:08

## 2018-08-28 RX ADMIN — HEPARIN 500 UNITS: 100 SYRINGE at 12:08

## 2018-08-28 NOTE — NURSING
Pt here for EPOCH pump exchange, reports tolerating treatment well, no new complaints or concerns at present; ambulatory infusion completed, pt tolerated well; EPOCH pump connected to pt, all connected tightened and taped, pump infusing w/out difficulty; pt instructed to remain well hydrated, to contact MD for any needs or concerns; pt declined printed AVS, instructed to return to center tomorrow at 1230 for pump exchange, pt verbalized understanding of all discussed and when to report next

## 2018-08-29 ENCOUNTER — INFUSION (OUTPATIENT)
Dept: INFUSION THERAPY | Facility: HOSPITAL | Age: 63
End: 2018-08-29
Attending: INTERNAL MEDICINE
Payer: COMMERCIAL

## 2018-08-29 VITALS
TEMPERATURE: 99 F | WEIGHT: 231.5 LBS | DIASTOLIC BLOOD PRESSURE: 83 MMHG | HEART RATE: 87 BPM | SYSTOLIC BLOOD PRESSURE: 139 MMHG | BODY MASS INDEX: 29.71 KG/M2 | RESPIRATION RATE: 18 BRPM | HEIGHT: 74 IN

## 2018-08-29 DIAGNOSIS — C85.90 T-CELL LYMPHOMA: Primary | ICD-10-CM

## 2018-08-29 PROCEDURE — 63600175 PHARM REV CODE 636 W HCPCS: Performed by: INTERNAL MEDICINE

## 2018-08-29 PROCEDURE — 25000003 PHARM REV CODE 250: Performed by: INTERNAL MEDICINE

## 2018-08-29 PROCEDURE — 96416 CHEMO PROLONG INFUSE W/PUMP: CPT

## 2018-08-29 PROCEDURE — A4216 STERILE WATER/SALINE, 10 ML: HCPCS | Performed by: INTERNAL MEDICINE

## 2018-08-29 RX ORDER — SODIUM CHLORIDE 0.9 % (FLUSH) 0.9 %
10 SYRINGE (ML) INJECTION
Status: DISCONTINUED | OUTPATIENT
Start: 2018-08-29 | End: 2018-08-29 | Stop reason: HOSPADM

## 2018-08-29 RX ORDER — HEPARIN 100 UNIT/ML
500 SYRINGE INTRAVENOUS
Status: DISCONTINUED | OUTPATIENT
Start: 2018-08-29 | End: 2018-08-29 | Stop reason: HOSPADM

## 2018-08-29 RX ADMIN — Medication 10 ML: at 12:08

## 2018-08-29 RX ADMIN — VINCRISTINE SULFATE 24 MG: 1 INJECTION, SOLUTION INTRAVENOUS at 12:08

## 2018-08-29 RX ADMIN — HEPARIN 500 UNITS: 100 SYRINGE at 12:08

## 2018-08-29 NOTE — PLAN OF CARE
Problem: Patient Care Overview  Goal: Plan of Care Review  Outcome: Ongoing (interventions implemented as appropriate)  Pt tolerated D3C4 EPOCh pump exchange without complications. VSS. No complaints voiced. CADD pump connected to patient and infusing without complications. Settings verified by 2nd RN. RTC around 12:50 tomorrow. AVS given to patient.

## 2018-08-30 ENCOUNTER — INFUSION (OUTPATIENT)
Dept: INFUSION THERAPY | Facility: HOSPITAL | Age: 63
End: 2018-08-30
Attending: INTERNAL MEDICINE
Payer: COMMERCIAL

## 2018-08-30 VITALS
TEMPERATURE: 99 F | SYSTOLIC BLOOD PRESSURE: 140 MMHG | DIASTOLIC BLOOD PRESSURE: 92 MMHG | HEART RATE: 90 BPM | RESPIRATION RATE: 18 BRPM

## 2018-08-30 DIAGNOSIS — C85.90 T-CELL LYMPHOMA: Primary | ICD-10-CM

## 2018-08-30 PROCEDURE — 96521 REFILL/MAINT PORTABLE PUMP: CPT

## 2018-08-30 PROCEDURE — 63600175 PHARM REV CODE 636 W HCPCS: Performed by: INTERNAL MEDICINE

## 2018-08-30 PROCEDURE — 25000003 PHARM REV CODE 250: Performed by: INTERNAL MEDICINE

## 2018-08-30 PROCEDURE — A4216 STERILE WATER/SALINE, 10 ML: HCPCS | Performed by: INTERNAL MEDICINE

## 2018-08-30 RX ORDER — HEPARIN 100 UNIT/ML
500 SYRINGE INTRAVENOUS
Status: DISCONTINUED | OUTPATIENT
Start: 2018-08-30 | End: 2018-08-30 | Stop reason: HOSPADM

## 2018-08-30 RX ORDER — SODIUM CHLORIDE 0.9 % (FLUSH) 0.9 %
10 SYRINGE (ML) INJECTION
Status: DISCONTINUED | OUTPATIENT
Start: 2018-08-30 | End: 2018-08-30 | Stop reason: HOSPADM

## 2018-08-30 RX ADMIN — VINCRISTINE SULFATE 480 ML: 1 INJECTION, SOLUTION INTRAVENOUS at 01:08

## 2018-08-30 RX ADMIN — HEPARIN 500 UNITS: 100 SYRINGE at 01:08

## 2018-08-30 RX ADMIN — Medication 10 ML: at 01:08

## 2018-08-30 NOTE — PLAN OF CARE
Problem: Patient Care Overview  Goal: Plan of Care Review  Outcome: Ongoing (interventions implemented as appropriate)  Pt arrived for EPOCH pump exchange. CADD pump infusion complete on arrival. Pt reconnected to CADD pump to run over 24 hours at 20 ml/hr; connections secure and pump settings verified by second RN. PICC line dressing changed prior to d/c. RTC tomorrow around 1pm for D5 Mesna/Cytoxan. VSS; NAD. Discharging unassisted.

## 2018-08-31 ENCOUNTER — LAB VISIT (OUTPATIENT)
Dept: LAB | Facility: HOSPITAL | Age: 63
End: 2018-08-31
Attending: INTERNAL MEDICINE
Payer: COMMERCIAL

## 2018-08-31 ENCOUNTER — INFUSION (OUTPATIENT)
Dept: INFUSION THERAPY | Facility: HOSPITAL | Age: 63
End: 2018-08-31
Attending: INTERNAL MEDICINE
Payer: COMMERCIAL

## 2018-08-31 VITALS
SYSTOLIC BLOOD PRESSURE: 138 MMHG | HEART RATE: 92 BPM | DIASTOLIC BLOOD PRESSURE: 78 MMHG | TEMPERATURE: 98 F | RESPIRATION RATE: 18 BRPM

## 2018-08-31 DIAGNOSIS — C85.90 T-CELL LYMPHOMA: ICD-10-CM

## 2018-08-31 DIAGNOSIS — C85.90 T-CELL LYMPHOMA: Primary | ICD-10-CM

## 2018-08-31 LAB
BASOPHILS # BLD AUTO: 0.01 K/UL
BASOPHILS NFR BLD: 0.2 %
DIFFERENTIAL METHOD: ABNORMAL
EOSINOPHIL # BLD AUTO: 0 K/UL
EOSINOPHIL NFR BLD: 0 %
ERYTHROCYTE [DISTWIDTH] IN BLOOD BY AUTOMATED COUNT: 14.5 %
HCT VFR BLD AUTO: 33.4 %
HGB BLD-MCNC: 11.4 G/DL
IMM GRANULOCYTES # BLD AUTO: 0.02 K/UL
IMM GRANULOCYTES NFR BLD AUTO: 0.4 %
LYMPHOCYTES # BLD AUTO: 0.7 K/UL
LYMPHOCYTES NFR BLD: 14.9 %
MCH RBC QN AUTO: 30.2 PG
MCHC RBC AUTO-ENTMCNC: 34.1 G/DL
MCV RBC AUTO: 88 FL
MONOCYTES # BLD AUTO: 0.1 K/UL
MONOCYTES NFR BLD: 1.4 %
NEUTROPHILS # BLD AUTO: 4.1 K/UL
NEUTROPHILS NFR BLD: 83.1 %
NRBC BLD-RTO: 0 /100 WBC
PLATELET # BLD AUTO: 376 K/UL
PMV BLD AUTO: 9.3 FL
RBC # BLD AUTO: 3.78 M/UL
WBC # BLD AUTO: 4.91 K/UL

## 2018-08-31 PROCEDURE — 96417 CHEMO IV INFUS EACH ADDL SEQ: CPT

## 2018-08-31 PROCEDURE — 96413 CHEMO IV INFUSION 1 HR: CPT

## 2018-08-31 PROCEDURE — 85025 COMPLETE CBC W/AUTO DIFF WBC: CPT

## 2018-08-31 PROCEDURE — 63600175 PHARM REV CODE 636 W HCPCS: Performed by: INTERNAL MEDICINE

## 2018-08-31 PROCEDURE — 96367 TX/PROPH/DG ADDL SEQ IV INF: CPT

## 2018-08-31 PROCEDURE — 36415 COLL VENOUS BLD VENIPUNCTURE: CPT

## 2018-08-31 PROCEDURE — 25000003 PHARM REV CODE 250: Performed by: INTERNAL MEDICINE

## 2018-08-31 RX ORDER — HEPARIN 100 UNIT/ML
500 SYRINGE INTRAVENOUS
Status: DISCONTINUED | OUTPATIENT
Start: 2018-08-31 | End: 2018-08-31 | Stop reason: HOSPADM

## 2018-08-31 RX ORDER — SODIUM CHLORIDE 0.9 % (FLUSH) 0.9 %
10 SYRINGE (ML) INJECTION
Status: DISCONTINUED | OUTPATIENT
Start: 2018-08-31 | End: 2018-08-31 | Stop reason: HOSPADM

## 2018-08-31 RX ADMIN — CYCLOPHOSPHAMIDE 2150 MG: 1 INJECTION, POWDER, FOR SOLUTION INTRAVENOUS; ORAL at 02:08

## 2018-08-31 RX ADMIN — DEXAMETHASONE SODIUM PHOSPHATE: 4 INJECTION, SOLUTION INTRA-ARTICULAR; INTRALESIONAL; INTRAMUSCULAR; INTRAVENOUS; SOFT TISSUE at 01:08

## 2018-08-31 RX ADMIN — HEPARIN 500 UNITS: 100 SYRINGE at 03:08

## 2018-08-31 RX ADMIN — SODIUM CHLORIDE: 9 INJECTION, SOLUTION INTRAVENOUS at 01:08

## 2018-08-31 RX ADMIN — MESNA 1195 MG: 100 INJECTION, SOLUTION INTRAVENOUS at 02:08

## 2018-08-31 NOTE — PLAN OF CARE
Problem: Patient Care Overview  Goal: Plan of Care Review  Outcome: Ongoing (interventions implemented as appropriate)  Pt disconnected from EPOCH pump. Infusion completed with no complications. Pt tolerated Cytoxan. VSS. Pt instructed to call MD with any problems. NAD. Pt discharged home independently.

## 2018-09-01 ENCOUNTER — INFUSION (OUTPATIENT)
Dept: INFUSION THERAPY | Facility: HOSPITAL | Age: 63
End: 2018-09-01
Attending: INTERNAL MEDICINE
Payer: COMMERCIAL

## 2018-09-01 DIAGNOSIS — C85.90 T-CELL LYMPHOMA: Primary | ICD-10-CM

## 2018-09-01 PROCEDURE — 96372 THER/PROPH/DIAG INJ SC/IM: CPT

## 2018-09-01 PROCEDURE — 63600175 PHARM REV CODE 636 W HCPCS: Mod: JG | Performed by: INTERNAL MEDICINE

## 2018-09-01 RX ADMIN — PEGFILGRASTIM 6 MG: 6 INJECTION SUBCUTANEOUS at 11:09

## 2018-09-03 ENCOUNTER — PATIENT MESSAGE (OUTPATIENT)
Dept: HEMATOLOGY/ONCOLOGY | Facility: CLINIC | Age: 63
End: 2018-09-03

## 2018-09-04 ENCOUNTER — LAB VISIT (OUTPATIENT)
Dept: LAB | Facility: HOSPITAL | Age: 63
End: 2018-09-04
Attending: INTERNAL MEDICINE
Payer: COMMERCIAL

## 2018-09-04 DIAGNOSIS — C85.90 T-CELL LYMPHOMA: ICD-10-CM

## 2018-09-04 LAB
ANISOCYTOSIS BLD QL SMEAR: SLIGHT
BASOPHILS # BLD AUTO: ABNORMAL K/UL
BASOPHILS NFR BLD: 0 %
DIFFERENTIAL METHOD: ABNORMAL
EOSINOPHIL # BLD AUTO: ABNORMAL K/UL
EOSINOPHIL NFR BLD: 0 %
ERYTHROCYTE [DISTWIDTH] IN BLOOD BY AUTOMATED COUNT: 15.2 %
HCT VFR BLD AUTO: 33.4 %
HGB BLD-MCNC: 11 G/DL
HYPOCHROMIA BLD QL SMEAR: ABNORMAL
IMM GRANULOCYTES # BLD AUTO: ABNORMAL K/UL
IMM GRANULOCYTES NFR BLD AUTO: ABNORMAL %
LYMPHOCYTES # BLD AUTO: ABNORMAL K/UL
LYMPHOCYTES NFR BLD: 11 %
MCH RBC QN AUTO: 30.1 PG
MCHC RBC AUTO-ENTMCNC: 32.9 G/DL
MCV RBC AUTO: 92 FL
MONOCYTES # BLD AUTO: ABNORMAL K/UL
MONOCYTES NFR BLD: 0 %
NEUTROPHILS NFR BLD: 89 %
NRBC BLD-RTO: 0 /100 WBC
OVALOCYTES BLD QL SMEAR: ABNORMAL
PLATELET # BLD AUTO: 127 K/UL
PMV BLD AUTO: 9.3 FL
POIKILOCYTOSIS BLD QL SMEAR: SLIGHT
POLYCHROMASIA BLD QL SMEAR: ABNORMAL
RBC # BLD AUTO: 3.65 M/UL
WBC # BLD AUTO: 10.44 K/UL

## 2018-09-04 PROCEDURE — 36415 COLL VENOUS BLD VENIPUNCTURE: CPT

## 2018-09-04 PROCEDURE — 85007 BL SMEAR W/DIFF WBC COUNT: CPT

## 2018-09-04 PROCEDURE — 85027 COMPLETE CBC AUTOMATED: CPT

## 2018-09-06 ENCOUNTER — LAB VISIT (OUTPATIENT)
Dept: LAB | Facility: HOSPITAL | Age: 63
End: 2018-09-06
Payer: COMMERCIAL

## 2018-09-06 ENCOUNTER — HOSPITAL ENCOUNTER (OUTPATIENT)
Dept: CARDIOLOGY | Facility: CLINIC | Age: 63
Discharge: HOME | End: 2018-09-06
Attending: INTERNAL MEDICINE
Payer: COMMERCIAL

## 2018-09-06 ENCOUNTER — INFUSION (OUTPATIENT)
Dept: INFUSION THERAPY | Facility: HOSPITAL | Age: 63
End: 2018-09-06
Attending: INTERNAL MEDICINE
Payer: COMMERCIAL

## 2018-09-06 VITALS — BODY MASS INDEX: 28.88 KG/M2 | WEIGHT: 225.06 LBS | HEIGHT: 74 IN

## 2018-09-06 DIAGNOSIS — C85.90 T-CELL LYMPHOMA: Primary | ICD-10-CM

## 2018-09-06 DIAGNOSIS — C85.90 T-CELL LYMPHOMA: ICD-10-CM

## 2018-09-06 LAB
ANISOCYTOSIS BLD QL SMEAR: SLIGHT
BASOPHILS # BLD AUTO: ABNORMAL K/UL
BASOPHILS NFR BLD: 0 %
DIASTOLIC DYSFUNCTION: NO
DIFFERENTIAL METHOD: ABNORMAL
EOSINOPHIL # BLD AUTO: ABNORMAL K/UL
EOSINOPHIL NFR BLD: 0 %
ERYTHROCYTE [DISTWIDTH] IN BLOOD BY AUTOMATED COUNT: 14.8 %
HCT VFR BLD AUTO: 31.9 %
HGB BLD-MCNC: 10.7 G/DL
IMM GRANULOCYTES # BLD AUTO: ABNORMAL K/UL
IMM GRANULOCYTES NFR BLD AUTO: ABNORMAL %
LYMPHOCYTES # BLD AUTO: ABNORMAL K/UL
LYMPHOCYTES NFR BLD: 38.6 %
MCH RBC QN AUTO: 30.4 PG
MCHC RBC AUTO-ENTMCNC: 33.5 G/DL
MCV RBC AUTO: 91 FL
MONOCYTES # BLD AUTO: ABNORMAL K/UL
MONOCYTES NFR BLD: 2.3 %
NEUTROPHILS NFR BLD: 56.8 %
NEUTS BAND NFR BLD MANUAL: 2.3 %
NRBC BLD-RTO: 0 /100 WBC
OVALOCYTES BLD QL SMEAR: ABNORMAL
PLATELET # BLD AUTO: 53 K/UL
PLATELET BLD QL SMEAR: ABNORMAL
PMV BLD AUTO: 11.3 FL
POIKILOCYTOSIS BLD QL SMEAR: SLIGHT
RBC # BLD AUTO: 3.52 M/UL
RETIRED EF AND QEF - SEE NOTES: 68 (ref 55–65)
WBC # BLD AUTO: 1.75 K/UL

## 2018-09-06 PROCEDURE — 85007 BL SMEAR W/DIFF WBC COUNT: CPT

## 2018-09-06 PROCEDURE — 36415 COLL VENOUS BLD VENIPUNCTURE: CPT

## 2018-09-06 PROCEDURE — A4216 STERILE WATER/SALINE, 10 ML: HCPCS | Performed by: INTERNAL MEDICINE

## 2018-09-06 PROCEDURE — 25000003 PHARM REV CODE 250: Performed by: INTERNAL MEDICINE

## 2018-09-06 PROCEDURE — 63600175 PHARM REV CODE 636 W HCPCS: Performed by: INTERNAL MEDICINE

## 2018-09-06 PROCEDURE — 93307 TTE W/O DOPPLER COMPLETE: CPT | Mod: ,,, | Performed by: INTERNAL MEDICINE

## 2018-09-06 PROCEDURE — 85027 COMPLETE CBC AUTOMATED: CPT

## 2018-09-06 PROCEDURE — 96523 IRRIG DRUG DELIVERY DEVICE: CPT

## 2018-09-06 RX ORDER — SODIUM CHLORIDE 0.9 % (FLUSH) 0.9 %
10 SYRINGE (ML) INJECTION
Status: COMPLETED | OUTPATIENT
Start: 2018-09-06 | End: 2018-09-06

## 2018-09-06 RX ORDER — SODIUM CHLORIDE 0.9 % (FLUSH) 0.9 %
10 SYRINGE (ML) INJECTION
Status: CANCELLED | OUTPATIENT
Start: 2018-09-06

## 2018-09-06 RX ORDER — HEPARIN 100 UNIT/ML
500 SYRINGE INTRAVENOUS
Status: COMPLETED | OUTPATIENT
Start: 2018-09-06 | End: 2018-09-06

## 2018-09-06 RX ORDER — HEPARIN 100 UNIT/ML
500 SYRINGE INTRAVENOUS
Status: CANCELLED | OUTPATIENT
Start: 2018-09-06

## 2018-09-06 RX ADMIN — HEPARIN 500 UNITS: 100 SYRINGE at 08:09

## 2018-09-06 RX ADMIN — Medication 10 ML: at 08:09

## 2018-09-06 NOTE — NURSING
PICC dressing changed with no complications. Both lumens flushed, caps changed, heparin locked. NAD. Pt discharged home independently.

## 2018-09-10 ENCOUNTER — INFUSION (OUTPATIENT)
Dept: INFUSION THERAPY | Facility: HOSPITAL | Age: 63
End: 2018-09-10
Attending: INTERNAL MEDICINE
Payer: COMMERCIAL

## 2018-09-10 ENCOUNTER — LAB VISIT (OUTPATIENT)
Dept: LAB | Facility: HOSPITAL | Age: 63
End: 2018-09-10
Payer: COMMERCIAL

## 2018-09-10 DIAGNOSIS — C85.90 T-CELL LYMPHOMA: Primary | ICD-10-CM

## 2018-09-10 DIAGNOSIS — C85.90 T-CELL LYMPHOMA: ICD-10-CM

## 2018-09-10 LAB
ANISOCYTOSIS BLD QL SMEAR: SLIGHT
BASOPHILS NFR BLD: 2 %
DACRYOCYTES BLD QL SMEAR: ABNORMAL
DIFFERENTIAL METHOD: ABNORMAL
EOSINOPHIL NFR BLD: 1 %
ERYTHROCYTE [DISTWIDTH] IN BLOOD BY AUTOMATED COUNT: 15 %
HCT VFR BLD AUTO: 29.8 %
HGB BLD-MCNC: 9.6 G/DL
HYPOCHROMIA BLD QL SMEAR: ABNORMAL
IMM GRANULOCYTES # BLD AUTO: ABNORMAL K/UL
IMM GRANULOCYTES NFR BLD AUTO: ABNORMAL %
LYMPHOCYTES NFR BLD: 24 %
MCH RBC QN AUTO: 29.8 PG
MCHC RBC AUTO-ENTMCNC: 32.2 G/DL
MCV RBC AUTO: 93 FL
MONOCYTES NFR BLD: 7 %
NEUTROPHILS NFR BLD: 63 %
NEUTS BAND NFR BLD MANUAL: 3 %
NRBC BLD-RTO: 2 /100 WBC
OVALOCYTES BLD QL SMEAR: ABNORMAL
PLATELET # BLD AUTO: 119 K/UL
PMV BLD AUTO: 11.4 FL
POIKILOCYTOSIS BLD QL SMEAR: SLIGHT
POLYCHROMASIA BLD QL SMEAR: ABNORMAL
RBC # BLD AUTO: 3.22 M/UL
WBC # BLD AUTO: 5.02 K/UL

## 2018-09-10 PROCEDURE — A4216 STERILE WATER/SALINE, 10 ML: HCPCS | Performed by: INTERNAL MEDICINE

## 2018-09-10 PROCEDURE — 36415 COLL VENOUS BLD VENIPUNCTURE: CPT

## 2018-09-10 PROCEDURE — 25000003 PHARM REV CODE 250: Performed by: INTERNAL MEDICINE

## 2018-09-10 PROCEDURE — 85007 BL SMEAR W/DIFF WBC COUNT: CPT

## 2018-09-10 PROCEDURE — 85027 COMPLETE CBC AUTOMATED: CPT

## 2018-09-10 PROCEDURE — 63600175 PHARM REV CODE 636 W HCPCS: Performed by: INTERNAL MEDICINE

## 2018-09-10 PROCEDURE — 96523 IRRIG DRUG DELIVERY DEVICE: CPT

## 2018-09-10 RX ORDER — HEPARIN 100 UNIT/ML
500 SYRINGE INTRAVENOUS
Status: CANCELLED | OUTPATIENT
Start: 2018-09-10

## 2018-09-10 RX ORDER — SODIUM CHLORIDE 0.9 % (FLUSH) 0.9 %
10 SYRINGE (ML) INJECTION
Status: CANCELLED | OUTPATIENT
Start: 2018-09-10

## 2018-09-10 RX ORDER — HEPARIN 100 UNIT/ML
500 SYRINGE INTRAVENOUS
Status: COMPLETED | OUTPATIENT
Start: 2018-09-10 | End: 2018-09-10

## 2018-09-10 RX ORDER — SODIUM CHLORIDE 0.9 % (FLUSH) 0.9 %
10 SYRINGE (ML) INJECTION
Status: COMPLETED | OUTPATIENT
Start: 2018-09-10 | End: 2018-09-10

## 2018-09-10 RX ADMIN — SODIUM CHLORIDE, PRESERVATIVE FREE 10 ML: 5 INJECTION INTRAVENOUS at 08:09

## 2018-09-10 RX ADMIN — HEPARIN 500 UNITS: 100 SYRINGE at 08:09

## 2018-09-10 NOTE — NURSING
Patient here for lab draw from PICC.  Blood return not enough from either lumen for CBC.  Patient states he normally gets labs done on 3rd floor due to this.  Patient has appt on 3rd floor today for labs.  Flushed PICC lines with NS and heparin and sent patient to 3rd floor for labs to be drawn there.  No questions or concerns.  Patient ambulated off unit unassisted.

## 2018-09-13 ENCOUNTER — LAB VISIT (OUTPATIENT)
Dept: LAB | Facility: HOSPITAL | Age: 63
End: 2018-09-13
Payer: COMMERCIAL

## 2018-09-13 ENCOUNTER — INFUSION (OUTPATIENT)
Dept: INFUSION THERAPY | Facility: HOSPITAL | Age: 63
End: 2018-09-13
Attending: INTERNAL MEDICINE
Payer: COMMERCIAL

## 2018-09-13 DIAGNOSIS — C85.90 T-CELL LYMPHOMA: ICD-10-CM

## 2018-09-13 DIAGNOSIS — C85.90 T-CELL LYMPHOMA: Primary | ICD-10-CM

## 2018-09-13 LAB
BASOPHILS # BLD AUTO: 0.04 K/UL
BASOPHILS NFR BLD: 0.6 %
DIFFERENTIAL METHOD: ABNORMAL
EOSINOPHIL # BLD AUTO: 0 K/UL
EOSINOPHIL NFR BLD: 0.1 %
ERYTHROCYTE [DISTWIDTH] IN BLOOD BY AUTOMATED COUNT: 16.2 %
HCT VFR BLD AUTO: 32.7 %
HGB BLD-MCNC: 10.4 G/DL
IMM GRANULOCYTES # BLD AUTO: 0.28 K/UL
IMM GRANULOCYTES NFR BLD AUTO: 3.9 %
LYMPHOCYTES # BLD AUTO: 1.5 K/UL
LYMPHOCYTES NFR BLD: 20.3 %
MCH RBC QN AUTO: 29.3 PG
MCHC RBC AUTO-ENTMCNC: 31.8 G/DL
MCV RBC AUTO: 92 FL
MONOCYTES # BLD AUTO: 1.2 K/UL
MONOCYTES NFR BLD: 16.6 %
NEUTROPHILS # BLD AUTO: 4.2 K/UL
NEUTROPHILS NFR BLD: 58.5 %
NRBC BLD-RTO: 2 /100 WBC
PLATELET # BLD AUTO: 303 K/UL
PMV BLD AUTO: 9.7 FL
RBC # BLD AUTO: 3.55 M/UL
WBC # BLD AUTO: 7.23 K/UL

## 2018-09-13 PROCEDURE — 85025 COMPLETE CBC W/AUTO DIFF WBC: CPT

## 2018-09-13 PROCEDURE — 96523 IRRIG DRUG DELIVERY DEVICE: CPT

## 2018-09-13 PROCEDURE — 25000003 PHARM REV CODE 250: Performed by: INTERNAL MEDICINE

## 2018-09-13 PROCEDURE — A4216 STERILE WATER/SALINE, 10 ML: HCPCS | Performed by: INTERNAL MEDICINE

## 2018-09-13 PROCEDURE — 63600175 PHARM REV CODE 636 W HCPCS: Performed by: INTERNAL MEDICINE

## 2018-09-13 PROCEDURE — 36415 COLL VENOUS BLD VENIPUNCTURE: CPT

## 2018-09-13 RX ORDER — SODIUM CHLORIDE 0.9 % (FLUSH) 0.9 %
10 SYRINGE (ML) INJECTION
Status: COMPLETED | OUTPATIENT
Start: 2018-09-13 | End: 2018-09-13

## 2018-09-13 RX ORDER — HEPARIN 100 UNIT/ML
500 SYRINGE INTRAVENOUS
Status: COMPLETED | OUTPATIENT
Start: 2018-09-13 | End: 2018-09-13

## 2018-09-13 RX ORDER — HEPARIN 100 UNIT/ML
500 SYRINGE INTRAVENOUS
Status: CANCELLED | OUTPATIENT
Start: 2018-09-13

## 2018-09-13 RX ORDER — SODIUM CHLORIDE 0.9 % (FLUSH) 0.9 %
10 SYRINGE (ML) INJECTION
Status: CANCELLED | OUTPATIENT
Start: 2018-09-13

## 2018-09-13 RX ADMIN — HEPARIN 500 UNITS: 100 SYRINGE at 01:09

## 2018-09-13 RX ADMIN — Medication 10 ML: at 01:09

## 2018-09-17 ENCOUNTER — OFFICE VISIT (OUTPATIENT)
Dept: HEMATOLOGY/ONCOLOGY | Facility: CLINIC | Age: 63
End: 2018-09-17
Payer: COMMERCIAL

## 2018-09-17 ENCOUNTER — LAB VISIT (OUTPATIENT)
Dept: LAB | Facility: HOSPITAL | Age: 63
End: 2018-09-17
Attending: INTERNAL MEDICINE
Payer: COMMERCIAL

## 2018-09-17 ENCOUNTER — INFUSION (OUTPATIENT)
Dept: INFUSION THERAPY | Facility: HOSPITAL | Age: 63
End: 2018-09-17
Attending: INTERNAL MEDICINE
Payer: COMMERCIAL

## 2018-09-17 VITALS
DIASTOLIC BLOOD PRESSURE: 68 MMHG | WEIGHT: 226.88 LBS | HEIGHT: 74 IN | HEART RATE: 76 BPM | SYSTOLIC BLOOD PRESSURE: 122 MMHG | BODY MASS INDEX: 29.12 KG/M2

## 2018-09-17 VITALS
TEMPERATURE: 98 F | SYSTOLIC BLOOD PRESSURE: 124 MMHG | HEART RATE: 78 BPM | RESPIRATION RATE: 18 BRPM | DIASTOLIC BLOOD PRESSURE: 72 MMHG

## 2018-09-17 DIAGNOSIS — G60.9 IDIOPATHIC PERIPHERAL NEUROPATHY: ICD-10-CM

## 2018-09-17 DIAGNOSIS — C85.90 T-CELL LYMPHOMA: Primary | ICD-10-CM

## 2018-09-17 DIAGNOSIS — C85.90 T-CELL LYMPHOMA: ICD-10-CM

## 2018-09-17 PROBLEM — G62.9 PERIPHERAL NEUROPATHY: Status: RESOLVED | Noted: 2018-06-19 | Resolved: 2018-09-17

## 2018-09-17 LAB
BASOPHILS # BLD AUTO: 0.05 K/UL
BASOPHILS NFR BLD: 0.9 %
DIFFERENTIAL METHOD: ABNORMAL
EOSINOPHIL # BLD AUTO: 0 K/UL
EOSINOPHIL NFR BLD: 0.2 %
ERYTHROCYTE [DISTWIDTH] IN BLOOD BY AUTOMATED COUNT: 17 %
HCT VFR BLD AUTO: 34.4 %
HGB BLD-MCNC: 11 G/DL
IMM GRANULOCYTES # BLD AUTO: 0.07 K/UL
IMM GRANULOCYTES NFR BLD AUTO: 1.2 %
LYMPHOCYTES # BLD AUTO: 1.4 K/UL
LYMPHOCYTES NFR BLD: 24.4 %
MCH RBC QN AUTO: 30.3 PG
MCHC RBC AUTO-ENTMCNC: 32 G/DL
MCV RBC AUTO: 95 FL
MONOCYTES # BLD AUTO: 0.9 K/UL
MONOCYTES NFR BLD: 15.3 %
NEUTROPHILS # BLD AUTO: 3.4 K/UL
NEUTROPHILS NFR BLD: 58 %
NRBC BLD-RTO: 1 /100 WBC
PLATELET # BLD AUTO: 455 K/UL
PMV BLD AUTO: 9.3 FL
RBC # BLD AUTO: 3.63 M/UL
WBC # BLD AUTO: 5.77 K/UL

## 2018-09-17 PROCEDURE — 3008F BODY MASS INDEX DOCD: CPT | Mod: CPTII,S$GLB,, | Performed by: INTERNAL MEDICINE

## 2018-09-17 PROCEDURE — 85025 COMPLETE CBC W/AUTO DIFF WBC: CPT

## 2018-09-17 PROCEDURE — 99999 PR PBB SHADOW E&M-EST. PATIENT-LVL III: CPT | Mod: PBBFAC,,, | Performed by: INTERNAL MEDICINE

## 2018-09-17 PROCEDURE — 96367 TX/PROPH/DG ADDL SEQ IV INF: CPT

## 2018-09-17 PROCEDURE — 63600175 PHARM REV CODE 636 W HCPCS: Performed by: INTERNAL MEDICINE

## 2018-09-17 PROCEDURE — 96416 CHEMO PROLONG INFUSE W/PUMP: CPT

## 2018-09-17 PROCEDURE — A4216 STERILE WATER/SALINE, 10 ML: HCPCS | Performed by: INTERNAL MEDICINE

## 2018-09-17 PROCEDURE — 25000003 PHARM REV CODE 250: Performed by: INTERNAL MEDICINE

## 2018-09-17 PROCEDURE — 99214 OFFICE O/P EST MOD 30 MIN: CPT | Mod: S$GLB,,, | Performed by: INTERNAL MEDICINE

## 2018-09-17 PROCEDURE — 36415 COLL VENOUS BLD VENIPUNCTURE: CPT

## 2018-09-17 RX ORDER — SODIUM CHLORIDE 0.9 % (FLUSH) 0.9 %
10 SYRINGE (ML) INJECTION
Status: CANCELLED | OUTPATIENT
Start: 2018-09-18

## 2018-09-17 RX ORDER — HEPARIN 100 UNIT/ML
500 SYRINGE INTRAVENOUS
Status: DISCONTINUED | OUTPATIENT
Start: 2018-09-17 | End: 2018-09-17 | Stop reason: HOSPADM

## 2018-09-17 RX ORDER — SODIUM CHLORIDE 0.9 % (FLUSH) 0.9 %
10 SYRINGE (ML) INJECTION
Status: CANCELLED | OUTPATIENT
Start: 2018-09-17

## 2018-09-17 RX ORDER — HEPARIN 100 UNIT/ML
500 SYRINGE INTRAVENOUS
Status: COMPLETED | OUTPATIENT
Start: 2018-09-17 | End: 2018-09-17

## 2018-09-17 RX ORDER — SODIUM CHLORIDE 0.9 % (FLUSH) 0.9 %
10 SYRINGE (ML) INJECTION
Status: DISCONTINUED | OUTPATIENT
Start: 2018-09-17 | End: 2018-09-17 | Stop reason: HOSPADM

## 2018-09-17 RX ORDER — SODIUM CHLORIDE 0.9 % (FLUSH) 0.9 %
10 SYRINGE (ML) INJECTION
Status: CANCELLED | OUTPATIENT
Start: 2018-09-19

## 2018-09-17 RX ORDER — HEPARIN 100 UNIT/ML
500 SYRINGE INTRAVENOUS
Status: CANCELLED | OUTPATIENT
Start: 2018-09-17

## 2018-09-17 RX ORDER — SODIUM CHLORIDE 0.9 % (FLUSH) 0.9 %
10 SYRINGE (ML) INJECTION
Status: CANCELLED | OUTPATIENT
Start: 2018-09-20

## 2018-09-17 RX ORDER — HEPARIN 100 UNIT/ML
500 SYRINGE INTRAVENOUS
Status: CANCELLED | OUTPATIENT
Start: 2018-09-19

## 2018-09-17 RX ORDER — HEPARIN 100 UNIT/ML
500 SYRINGE INTRAVENOUS
Status: CANCELLED | OUTPATIENT
Start: 2018-09-18

## 2018-09-17 RX ORDER — HEPARIN 100 UNIT/ML
500 SYRINGE INTRAVENOUS
Status: CANCELLED | OUTPATIENT
Start: 2018-09-20

## 2018-09-17 RX ORDER — SODIUM CHLORIDE 0.9 % (FLUSH) 0.9 %
10 SYRINGE (ML) INJECTION
Status: CANCELLED | OUTPATIENT
Start: 2018-09-21

## 2018-09-17 RX ORDER — SODIUM CHLORIDE 0.9 % (FLUSH) 0.9 %
10 SYRINGE (ML) INJECTION
Status: COMPLETED | OUTPATIENT
Start: 2018-09-17 | End: 2018-09-17

## 2018-09-17 RX ORDER — HEPARIN 100 UNIT/ML
500 SYRINGE INTRAVENOUS
Status: CANCELLED | OUTPATIENT
Start: 2018-09-21

## 2018-09-17 RX ADMIN — HEPARIN 500 UNITS: 100 SYRINGE at 11:09

## 2018-09-17 RX ADMIN — VINCRISTINE SULFATE 20 ML/HR: 1 INJECTION, SOLUTION INTRAVENOUS at 11:09

## 2018-09-17 RX ADMIN — SODIUM CHLORIDE, PRESERVATIVE FREE 10 ML: 5 INJECTION INTRAVENOUS at 11:09

## 2018-09-17 RX ADMIN — PALONOSETRON HYDROCHLORIDE: 0.25 INJECTION, SOLUTION INTRAVENOUS at 10:09

## 2018-09-17 NOTE — PLAN OF CARE
Problem: Patient Care Overview  Goal: Plan of Care Review  Outcome: Ongoing (interventions implemented as appropriate)  Tolerated treatment well.  Advised to call MD for any problems or concerns.  AVS declined.  RTC on tomorrrow.  NAD noted upon discharge.

## 2018-09-17 NOTE — PLAN OF CARE
Problem: Chemotherapy Effects (Adult)  Goal: Signs and Symptoms of Listed Potential Problems Will be Absent, Minimized or Managed (Chemotherapy Effects)  Signs and symptoms of listed potential problems will be absent, minimized or managed by discharge/transition of care (reference Chemotherapy Effects (Adult) CPG).   Outcome: Ongoing (interventions implemented as appropriate)  Here for EPOCH pump

## 2018-09-17 NOTE — PROGRESS NOTES
Mr. Mcpherson is a 63-year-old man who is being treated for peripheral T-cell   lymphoma.  I recorded the history of his diagnosis and initial surgery in my   note of 06/19/2018.  All of the disease that has been detected is in his neck,   so his stage is IE.  A PET scan in June 2018 was normal except for a lymph node   just to the left of midline in the upper neck with an SUV max of 18.4.  This   node was palpable but it disappeared after the first course of therapy.    He has completed four cycles of dose adjusted EPOCH.  The doses of   cyclophosphamide and etoposide have been increased based on neutrophil and   platelet counts.  I have not increased the Adriamycin or the vincristine because   of his history of diabetes mellitus and my concern that he may have a slightly   higher than normal risk of future cardiac disease.    He had some very mild mouth irritation.  He has not had fever.  He takes   Levaquin for 10 days during the time when his neutrophil count is low.  He   fatigues easily.  He again had muscle and some bone aching, which he has   attributed to Neulasta.  These symptoms last for 5-7 days.  He occasionally   requires an analgesic.  He has some numbness in his feet prior to starting   therapy and it has been stable.  He reports no cardiac symptoms.    The platelet count declined to 53,000 on 09/06/2018 and the white blood cell   count at that time was 1750 with granulocyte count of 980.    He is not having fevers or sweats.  He has not noted any new masses.    ADDITIONAL PAST HISTORY, SYSTEM REVIEW, SOCIAL HISTORY AND FAMILY HISTORY:  Have   been reviewed and updated in the electronic record.    PHYSICAL EXAMINATION:  GENERAL APPEARANCE:  Well-developed, well-nourished man in no distress.  EYES:  No jaundice or pallor.  MOUTH AND THROAT:  Good dentition.  No mucosal lesions.  NECK:  Surgical defect in the left neck.  Left facial palsy.  No palpable lymph   nodes in the neck.  There is some  induration in the region of the prior surgery.  LYMPH NODES:  No enlarged cervical, axillary or inguinal nodes.  CHEST AND LUNGS:  Normal respiratory effort.  Clear to auscultation and   percussion.  HEART:  Regular rate and rhythm without murmur or gallop.  ABDOMEN:  Soft without masses or tenderness.  No hepatosplenomegaly.  EXTREMITIES:  No edema or cyanosis.  PERIPHERAL VASCULATURE:  Good pulses in the feet and ankles.  NEUROLOGIC:  Motor function is normal.  Mental status is good.  Mild left facial   nerve palsy.    LABORATORY STUDIES:  Blood counts today include hemoglobin 10.4, platelets   303,000 and WBC 7230 with 58% granulocytes.  Comprehensive metabolic profile on   08/27/2018 was normal with the exception of glucose 173.  The 2D echocardiogram   performed on 09/06/2018 had an ejection fraction of 65%-70%.  Diastolic function   was normal.    IMPRESSION:  1.  Stage IE peripheral T-cell lymphoma.  2.  Mild stable peripheral neuropathy.  3.  Probable diabetes mellitus.    RECOMMENDATIONS:  1.  He will start a fifth course of EPOCH today.  The doses of cyclophosphamide   and etoposide will remain the same as those used in the last course.  2.  CBC every Monday and Thursday.  3.  He will take Levaquin 750 mg once daily for 10 days starting on day 6.  4.  Return visit ([facility]) in three weeks with CBC, CMP and appointment for a sixth   and final course of EPOCH.  5.  I talked to Mr. Mcpherson and his wife about his treatment and future plans.    Following the sixth course of EPOCH, I plan to repeat a PET scan and have him   return to visit Dr. Miller in Radiation.  There is very little information on the   use of radiation in treating patients with localized T-cell lymphoma since the   disease is rarely localized.  I think this will be a matter of opinion   and we will ascertain Dr. Miller's opinion.      AWB/HN  dd: 09/17/2018 09:09:00 (CDT)  td: 09/18/2018 04:56:15 (CDT)  Doc ID   #[id number]  Job ID #[id number]    CC:

## 2018-09-18 ENCOUNTER — INFUSION (OUTPATIENT)
Dept: INFUSION THERAPY | Facility: HOSPITAL | Age: 63
End: 2018-09-18
Attending: INTERNAL MEDICINE
Payer: COMMERCIAL

## 2018-09-18 VITALS
RESPIRATION RATE: 18 BRPM | BODY MASS INDEX: 29 KG/M2 | TEMPERATURE: 98 F | HEIGHT: 74 IN | SYSTOLIC BLOOD PRESSURE: 136 MMHG | WEIGHT: 226 LBS | DIASTOLIC BLOOD PRESSURE: 85 MMHG | HEART RATE: 100 BPM

## 2018-09-18 DIAGNOSIS — C85.90 T-CELL LYMPHOMA: Primary | ICD-10-CM

## 2018-09-18 PROCEDURE — 63600175 PHARM REV CODE 636 W HCPCS: Performed by: INTERNAL MEDICINE

## 2018-09-18 PROCEDURE — 25000003 PHARM REV CODE 250: Performed by: INTERNAL MEDICINE

## 2018-09-18 PROCEDURE — 96416 CHEMO PROLONG INFUSE W/PUMP: CPT

## 2018-09-18 RX ORDER — HEPARIN 100 UNIT/ML
500 SYRINGE INTRAVENOUS
Status: DISCONTINUED | OUTPATIENT
Start: 2018-09-18 | End: 2018-09-18 | Stop reason: HOSPADM

## 2018-09-18 RX ORDER — SODIUM CHLORIDE 0.9 % (FLUSH) 0.9 %
10 SYRINGE (ML) INJECTION
Status: DISCONTINUED | OUTPATIENT
Start: 2018-09-18 | End: 2018-09-18 | Stop reason: HOSPADM

## 2018-09-18 RX ADMIN — VINCRISTINE SULFATE 24 MG: 1 INJECTION, SOLUTION INTRAVENOUS at 12:09

## 2018-09-18 NOTE — PLAN OF CARE
Problem: Chemotherapy Effects (Adult)  Goal: Signs and Symptoms of Listed Potential Problems Will be Absent, Minimized or Managed (Chemotherapy Effects)  Signs and symptoms of listed potential problems will be absent, minimized or managed by discharge/transition of care (reference Chemotherapy Effects (Adult) CPG).   Outcome: Ongoing (interventions implemented as appropriate)  Pt here for EPOCH pump change, infusion bag complete, hx, meds, allergies reviewed, pt with no complaints at this time, reclined in chair, continue to monitor

## 2018-09-18 NOTE — PLAN OF CARE
Problem: Patient Care Overview  Goal: Plan of Care Review  Outcome: Ongoing (interventions implemented as appropriate)  cadd pump infusing to left arm picc at 20 ml/hr without issue prior to d/c, pt to rtc 9/20/18 around 1200 for EPOCH pump change, no distress noted upon d/c to home

## 2018-09-19 ENCOUNTER — PATIENT MESSAGE (OUTPATIENT)
Dept: HEMATOLOGY/ONCOLOGY | Facility: CLINIC | Age: 63
End: 2018-09-19

## 2018-09-19 ENCOUNTER — INFUSION (OUTPATIENT)
Dept: INFUSION THERAPY | Facility: HOSPITAL | Age: 63
End: 2018-09-19
Attending: INTERNAL MEDICINE
Payer: COMMERCIAL

## 2018-09-19 VITALS
SYSTOLIC BLOOD PRESSURE: 131 MMHG | TEMPERATURE: 98 F | HEART RATE: 81 BPM | DIASTOLIC BLOOD PRESSURE: 77 MMHG | RESPIRATION RATE: 16 BRPM

## 2018-09-19 DIAGNOSIS — C85.90 T-CELL LYMPHOMA: Primary | ICD-10-CM

## 2018-09-19 PROCEDURE — 63600175 PHARM REV CODE 636 W HCPCS: Performed by: INTERNAL MEDICINE

## 2018-09-19 PROCEDURE — 25000003 PHARM REV CODE 250: Performed by: INTERNAL MEDICINE

## 2018-09-19 PROCEDURE — 96521 REFILL/MAINT PORTABLE PUMP: CPT

## 2018-09-19 RX ADMIN — VINCRISTINE SULFATE 24 MG: 1 INJECTION, SOLUTION INTRAVENOUS at 01:09

## 2018-09-19 NOTE — NURSING
Patient here for EPOCH pump refill.  Assessment complete and VSS.  Flushed PICC line and hooked patient up to EPOCH pump.  Will RTC tomorrow at 1:00 PM for next refill.  AVS given to patient.  Patient ambulated off unit unassisted.

## 2018-09-20 ENCOUNTER — INFUSION (OUTPATIENT)
Dept: INFUSION THERAPY | Facility: HOSPITAL | Age: 63
End: 2018-09-20
Attending: INTERNAL MEDICINE
Payer: COMMERCIAL

## 2018-09-20 VITALS
HEART RATE: 92 BPM | RESPIRATION RATE: 16 BRPM | DIASTOLIC BLOOD PRESSURE: 90 MMHG | TEMPERATURE: 98 F | SYSTOLIC BLOOD PRESSURE: 135 MMHG

## 2018-09-20 DIAGNOSIS — C85.90 T-CELL LYMPHOMA: Primary | ICD-10-CM

## 2018-09-20 PROCEDURE — A4216 STERILE WATER/SALINE, 10 ML: HCPCS | Performed by: INTERNAL MEDICINE

## 2018-09-20 PROCEDURE — 96521 REFILL/MAINT PORTABLE PUMP: CPT

## 2018-09-20 PROCEDURE — 25000003 PHARM REV CODE 250: Performed by: INTERNAL MEDICINE

## 2018-09-20 PROCEDURE — 63600175 PHARM REV CODE 636 W HCPCS: Performed by: INTERNAL MEDICINE

## 2018-09-20 RX ORDER — SODIUM CHLORIDE 0.9 % (FLUSH) 0.9 %
10 SYRINGE (ML) INJECTION
Status: DISCONTINUED | OUTPATIENT
Start: 2018-09-20 | End: 2018-09-20 | Stop reason: HOSPADM

## 2018-09-20 RX ORDER — HEPARIN 100 UNIT/ML
500 SYRINGE INTRAVENOUS
Status: DISCONTINUED | OUTPATIENT
Start: 2018-09-20 | End: 2018-09-20 | Stop reason: HOSPADM

## 2018-09-20 RX ADMIN — VINCRISTINE SULFATE 20 ML/HR: 1 INJECTION, SOLUTION INTRAVENOUS at 01:09

## 2018-09-20 RX ADMIN — SODIUM CHLORIDE, PRESERVATIVE FREE 10 ML: 5 INJECTION INTRAVENOUS at 01:09

## 2018-09-20 RX ADMIN — HEPARIN 500 UNITS: 100 SYRINGE at 01:09

## 2018-09-21 ENCOUNTER — INFUSION (OUTPATIENT)
Dept: INFUSION THERAPY | Facility: HOSPITAL | Age: 63
End: 2018-09-21
Attending: INTERNAL MEDICINE
Payer: COMMERCIAL

## 2018-09-21 VITALS
TEMPERATURE: 98 F | RESPIRATION RATE: 16 BRPM | SYSTOLIC BLOOD PRESSURE: 128 MMHG | DIASTOLIC BLOOD PRESSURE: 84 MMHG | HEART RATE: 96 BPM

## 2018-09-21 DIAGNOSIS — C85.90 T-CELL LYMPHOMA: Primary | ICD-10-CM

## 2018-09-21 PROCEDURE — 63600175 PHARM REV CODE 636 W HCPCS: Mod: JG | Performed by: INTERNAL MEDICINE

## 2018-09-21 PROCEDURE — 96413 CHEMO IV INFUSION 1 HR: CPT

## 2018-09-21 PROCEDURE — 96367 TX/PROPH/DG ADDL SEQ IV INF: CPT

## 2018-09-21 PROCEDURE — 25000003 PHARM REV CODE 250: Performed by: INTERNAL MEDICINE

## 2018-09-21 RX ORDER — SODIUM CHLORIDE 0.9 % (FLUSH) 0.9 %
10 SYRINGE (ML) INJECTION
Status: DISCONTINUED | OUTPATIENT
Start: 2018-09-21 | End: 2018-09-21 | Stop reason: HOSPADM

## 2018-09-21 RX ORDER — HEPARIN 100 UNIT/ML
500 SYRINGE INTRAVENOUS
Status: DISCONTINUED | OUTPATIENT
Start: 2018-09-21 | End: 2018-09-21 | Stop reason: HOSPADM

## 2018-09-21 RX ADMIN — CYCLOPHOSPHAMIDE 2150 MG: 1 INJECTION, POWDER, FOR SOLUTION INTRAVENOUS; ORAL at 04:09

## 2018-09-21 RX ADMIN — SODIUM CHLORIDE 1195 MG: 900 INJECTION, SOLUTION INTRAVENOUS at 04:09

## 2018-09-21 RX ADMIN — DEXAMETHASONE SODIUM PHOSPHATE: 4 INJECTION, SOLUTION INTRA-ARTICULAR; INTRALESIONAL; INTRAMUSCULAR; INTRAVENOUS; SOFT TISSUE at 03:09

## 2018-09-21 RX ADMIN — MESNA 1195 MG: 100 INJECTION, SOLUTION INTRAVENOUS at 04:09

## 2018-09-21 NOTE — PLAN OF CARE
"Problem: Chemotherapy Effects (Adult)  Goal: Signs and Symptoms of Listed Potential Problems Will be Absent, Minimized or Managed (Chemotherapy Effects)  Signs and symptoms of listed potential problems will be absent, minimized or managed by discharge/transition of care (reference Chemotherapy Effects (Adult) CPG).   Outcome: Ongoing (interventions implemented as appropriate)  7:45am Patient arrived this morning stating "pump was alarming this am, was instructed to turn pump off by pump  and report to us ASAP".  Red lumen clotted off, purple lumen flushes with excellent blood return noted, switched the pump to the purple lumen.  Per Dr. Clarice mora to increase rate of pump form 20cc/hr to 24cc/hr for patient to complete infusion this afternoon.  3:30  Patient RTC with pump completed.  Tolerated well.  No reactions noted.  VSS.  No needs at this time.  Chair reclined and blanket offered.  Will continue to monitor.      "

## 2018-09-21 NOTE — PLAN OF CARE
Problem: Patient Care Overview  Goal: Plan of Care Review  Outcome: Ongoing (interventions implemented as appropriate)  Tolerated infusion well.  No reaction noted.  No questions or concerns at this time.  Ambulated off unit unassisted.

## 2018-09-22 ENCOUNTER — INFUSION (OUTPATIENT)
Dept: INFUSION THERAPY | Facility: HOSPITAL | Age: 63
End: 2018-09-22
Attending: INTERNAL MEDICINE
Payer: COMMERCIAL

## 2018-09-22 VITALS
TEMPERATURE: 99 F | HEART RATE: 98 BPM | RESPIRATION RATE: 18 BRPM | SYSTOLIC BLOOD PRESSURE: 123 MMHG | DIASTOLIC BLOOD PRESSURE: 81 MMHG

## 2018-09-22 DIAGNOSIS — C85.90 T-CELL LYMPHOMA: Primary | ICD-10-CM

## 2018-09-22 PROCEDURE — 96372 THER/PROPH/DIAG INJ SC/IM: CPT

## 2018-09-22 PROCEDURE — 63600175 PHARM REV CODE 636 W HCPCS: Mod: JG | Performed by: INTERNAL MEDICINE

## 2018-09-22 RX ADMIN — PEGFILGRASTIM 6 MG: 6 INJECTION SUBCUTANEOUS at 11:09

## 2018-09-22 NOTE — NURSING
neulasta inj administered SQ to DARIO pt tolerated well leaves clinic ambulatory no assist needed. Vitals stable.

## 2018-09-24 ENCOUNTER — LAB VISIT (OUTPATIENT)
Dept: LAB | Facility: HOSPITAL | Age: 63
End: 2018-09-24
Attending: INTERNAL MEDICINE
Payer: COMMERCIAL

## 2018-09-24 DIAGNOSIS — C85.90 T-CELL LYMPHOMA: ICD-10-CM

## 2018-09-24 LAB
ALBUMIN SERPL BCP-MCNC: 3.2 G/DL
ALP SERPL-CCNC: 84 U/L
ALT SERPL W/O P-5'-P-CCNC: 25 U/L
ANION GAP SERPL CALC-SCNC: 7 MMOL/L
ANISOCYTOSIS BLD QL SMEAR: SLIGHT
AST SERPL-CCNC: 12 U/L
BASOPHILS NFR BLD: 0 %
BILIRUB SERPL-MCNC: 0.7 MG/DL
BUN SERPL-MCNC: 9 MG/DL
CALCIUM SERPL-MCNC: 8.6 MG/DL
CHLORIDE SERPL-SCNC: 103 MMOL/L
CO2 SERPL-SCNC: 28 MMOL/L
CREAT SERPL-MCNC: 0.8 MG/DL
DIFFERENTIAL METHOD: ABNORMAL
EOSINOPHIL NFR BLD: 0 %
ERYTHROCYTE [DISTWIDTH] IN BLOOD BY AUTOMATED COUNT: 15.9 %
EST. GFR  (AFRICAN AMERICAN): >60 ML/MIN/1.73 M^2
EST. GFR  (NON AFRICAN AMERICAN): >60 ML/MIN/1.73 M^2
GLUCOSE SERPL-MCNC: 143 MG/DL
HCT VFR BLD AUTO: 30 %
HGB BLD-MCNC: 9.4 G/DL
HYPOCHROMIA BLD QL SMEAR: ABNORMAL
IMM GRANULOCYTES # BLD AUTO: ABNORMAL K/UL
IMM GRANULOCYTES NFR BLD AUTO: ABNORMAL %
LYMPHOCYTES NFR BLD: 7 %
MCH RBC QN AUTO: 29.3 PG
MCHC RBC AUTO-ENTMCNC: 31.3 G/DL
MCV RBC AUTO: 94 FL
MONOCYTES NFR BLD: 0 %
NEUTROPHILS NFR BLD: 92 %
NEUTS BAND NFR BLD MANUAL: 1 %
NRBC BLD-RTO: 0 /100 WBC
OVALOCYTES BLD QL SMEAR: ABNORMAL
PLATELET # BLD AUTO: 172 K/UL
PLATELET BLD QL SMEAR: ABNORMAL
PMV BLD AUTO: 9.8 FL
POIKILOCYTOSIS BLD QL SMEAR: SLIGHT
POLYCHROMASIA BLD QL SMEAR: ABNORMAL
POTASSIUM SERPL-SCNC: 3.2 MMOL/L
PROT SERPL-MCNC: 5.7 G/DL
RBC # BLD AUTO: 3.21 M/UL
SODIUM SERPL-SCNC: 138 MMOL/L
WBC # BLD AUTO: 24.48 K/UL

## 2018-09-24 PROCEDURE — 36415 COLL VENOUS BLD VENIPUNCTURE: CPT

## 2018-09-24 PROCEDURE — 80053 COMPREHEN METABOLIC PANEL: CPT

## 2018-09-27 ENCOUNTER — INFUSION (OUTPATIENT)
Dept: INFUSION THERAPY | Facility: HOSPITAL | Age: 63
End: 2018-09-27
Attending: INTERNAL MEDICINE
Payer: COMMERCIAL

## 2018-09-27 ENCOUNTER — LAB VISIT (OUTPATIENT)
Dept: LAB | Facility: HOSPITAL | Age: 63
End: 2018-09-27
Attending: INTERNAL MEDICINE
Payer: COMMERCIAL

## 2018-09-27 ENCOUNTER — DOCUMENTATION ONLY (OUTPATIENT)
Dept: HEMATOLOGY/ONCOLOGY | Facility: CLINIC | Age: 63
End: 2018-09-27

## 2018-09-27 ENCOUNTER — PATIENT MESSAGE (OUTPATIENT)
Dept: HEMATOLOGY/ONCOLOGY | Facility: CLINIC | Age: 63
End: 2018-09-27

## 2018-09-27 DIAGNOSIS — C85.90 T-CELL LYMPHOMA: ICD-10-CM

## 2018-09-27 DIAGNOSIS — C85.90 T-CELL LYMPHOMA: Primary | ICD-10-CM

## 2018-09-27 LAB
ALBUMIN SERPL BCP-MCNC: 3.5 G/DL
ALP SERPL-CCNC: 81 U/L
ALT SERPL W/O P-5'-P-CCNC: 23 U/L
ANION GAP SERPL CALC-SCNC: 8 MMOL/L
ANISOCYTOSIS BLD QL SMEAR: SLIGHT
AST SERPL-CCNC: 14 U/L
BASOPHILS # BLD AUTO: ABNORMAL K/UL
BASOPHILS NFR BLD: 5 %
BILIRUB SERPL-MCNC: 0.9 MG/DL
BUN SERPL-MCNC: 9 MG/DL
CALCIUM SERPL-MCNC: 8.8 MG/DL
CHLORIDE SERPL-SCNC: 106 MMOL/L
CO2 SERPL-SCNC: 25 MMOL/L
CREAT SERPL-MCNC: 0.7 MG/DL
DIFFERENTIAL METHOD: ABNORMAL
DOHLE BOD BLD QL SMEAR: PRESENT
EOSINOPHIL # BLD AUTO: ABNORMAL K/UL
EOSINOPHIL NFR BLD: 1 %
ERYTHROCYTE [DISTWIDTH] IN BLOOD BY AUTOMATED COUNT: 15.6 %
EST. GFR  (AFRICAN AMERICAN): >60 ML/MIN/1.73 M^2
EST. GFR  (NON AFRICAN AMERICAN): >60 ML/MIN/1.73 M^2
GLUCOSE SERPL-MCNC: 128 MG/DL
HCT VFR BLD AUTO: 29.3 %
HGB BLD-MCNC: 9.1 G/DL
IMM GRANULOCYTES # BLD AUTO: ABNORMAL K/UL
IMM GRANULOCYTES NFR BLD AUTO: ABNORMAL %
LYMPHOCYTES # BLD AUTO: ABNORMAL K/UL
LYMPHOCYTES NFR BLD: 45 %
MCH RBC QN AUTO: 29.4 PG
MCHC RBC AUTO-ENTMCNC: 31.1 G/DL
MCV RBC AUTO: 95 FL
MONOCYTES # BLD AUTO: ABNORMAL K/UL
MONOCYTES NFR BLD: 3 %
NEUTROPHILS NFR BLD: 46 %
NRBC BLD-RTO: 0 /100 WBC
OVALOCYTES BLD QL SMEAR: ABNORMAL
PLATELET # BLD AUTO: 39 K/UL
PLATELET BLD QL SMEAR: ABNORMAL
PMV BLD AUTO: 12 FL
POIKILOCYTOSIS BLD QL SMEAR: SLIGHT
POLYCHROMASIA BLD QL SMEAR: ABNORMAL
POTASSIUM SERPL-SCNC: 3.8 MMOL/L
PROT SERPL-MCNC: 6.5 G/DL
RBC # BLD AUTO: 3.09 M/UL
SODIUM SERPL-SCNC: 139 MMOL/L
TOXIC GRANULES BLD QL SMEAR: PRESENT
WBC # BLD AUTO: 0.97 K/UL

## 2018-09-27 PROCEDURE — 25000003 PHARM REV CODE 250: Performed by: INTERNAL MEDICINE

## 2018-09-27 PROCEDURE — 63600175 PHARM REV CODE 636 W HCPCS: Performed by: INTERNAL MEDICINE

## 2018-09-27 PROCEDURE — 96523 IRRIG DRUG DELIVERY DEVICE: CPT

## 2018-09-27 PROCEDURE — 85007 BL SMEAR W/DIFF WBC COUNT: CPT

## 2018-09-27 PROCEDURE — 85027 COMPLETE CBC AUTOMATED: CPT

## 2018-09-27 PROCEDURE — A4216 STERILE WATER/SALINE, 10 ML: HCPCS | Performed by: INTERNAL MEDICINE

## 2018-09-27 PROCEDURE — 80053 COMPREHEN METABOLIC PANEL: CPT

## 2018-09-27 PROCEDURE — 36415 COLL VENOUS BLD VENIPUNCTURE: CPT

## 2018-09-27 RX ORDER — SODIUM CHLORIDE 0.9 % (FLUSH) 0.9 %
10 SYRINGE (ML) INJECTION
Status: CANCELLED | OUTPATIENT
Start: 2018-09-27

## 2018-09-27 RX ORDER — HEPARIN 100 UNIT/ML
500 SYRINGE INTRAVENOUS
Status: COMPLETED | OUTPATIENT
Start: 2018-09-27 | End: 2018-09-27

## 2018-09-27 RX ORDER — HEPARIN 100 UNIT/ML
500 SYRINGE INTRAVENOUS
Status: CANCELLED | OUTPATIENT
Start: 2018-09-27

## 2018-09-27 RX ORDER — SODIUM CHLORIDE 0.9 % (FLUSH) 0.9 %
10 SYRINGE (ML) INJECTION
Status: COMPLETED | OUTPATIENT
Start: 2018-09-27 | End: 2018-09-27

## 2018-09-27 RX ADMIN — Medication 10 ML: at 09:09

## 2018-09-27 RX ADMIN — HEPARIN 500 UNITS: 100 SYRINGE at 09:09

## 2018-09-27 NOTE — NURSING
Purple port on picc with good blood return, flushed with saline and heparin.  Red port unable to flush.  Dressing changed, tolerated well.  Discharged to home, nad

## 2018-09-28 ENCOUNTER — DOCUMENTATION ONLY (OUTPATIENT)
Dept: HEMATOLOGY/ONCOLOGY | Facility: CLINIC | Age: 63
End: 2018-09-28

## 2018-09-28 ENCOUNTER — TELEPHONE (OUTPATIENT)
Dept: HEMATOLOGY/ONCOLOGY | Facility: CLINIC | Age: 63
End: 2018-09-28

## 2018-10-01 ENCOUNTER — LAB VISIT (OUTPATIENT)
Dept: LAB | Facility: HOSPITAL | Age: 63
End: 2018-10-01
Attending: INTERNAL MEDICINE
Payer: COMMERCIAL

## 2018-10-01 ENCOUNTER — PATIENT MESSAGE (OUTPATIENT)
Dept: HEMATOLOGY/ONCOLOGY | Facility: CLINIC | Age: 63
End: 2018-10-01

## 2018-10-01 DIAGNOSIS — C85.90 T-CELL LYMPHOMA: ICD-10-CM

## 2018-10-01 DIAGNOSIS — G62.9 PERIPHERAL POLYNEUROPATHY: ICD-10-CM

## 2018-10-01 LAB
ALBUMIN SERPL BCP-MCNC: 3.6 G/DL
ALP SERPL-CCNC: 81 U/L
ALT SERPL W/O P-5'-P-CCNC: 20 U/L
ANION GAP SERPL CALC-SCNC: 8 MMOL/L
ANISOCYTOSIS BLD QL SMEAR: SLIGHT
AST SERPL-CCNC: 16 U/L
BASOPHILS NFR BLD: 0 %
BILIRUB SERPL-MCNC: 0.3 MG/DL
BUN SERPL-MCNC: 3 MG/DL
CALCIUM SERPL-MCNC: 9.9 MG/DL
CHLORIDE SERPL-SCNC: 106 MMOL/L
CO2 SERPL-SCNC: 26 MMOL/L
CREAT SERPL-MCNC: 0.9 MG/DL
DIFFERENTIAL METHOD: ABNORMAL
EOSINOPHIL NFR BLD: 0 %
ERYTHROCYTE [DISTWIDTH] IN BLOOD BY AUTOMATED COUNT: 16.7 %
EST. GFR  (AFRICAN AMERICAN): >60 ML/MIN/1.73 M^2
EST. GFR  (NON AFRICAN AMERICAN): >60 ML/MIN/1.73 M^2
GLUCOSE SERPL-MCNC: 113 MG/DL
HCT VFR BLD AUTO: 31.8 %
HGB BLD-MCNC: 10 G/DL
HYPOCHROMIA BLD QL SMEAR: ABNORMAL
IMM GRANULOCYTES # BLD AUTO: ABNORMAL K/UL
IMM GRANULOCYTES NFR BLD AUTO: ABNORMAL %
LYMPHOCYTES NFR BLD: 10 %
MCH RBC QN AUTO: 29.9 PG
MCHC RBC AUTO-ENTMCNC: 31.4 G/DL
MCV RBC AUTO: 95 FL
MONOCYTES NFR BLD: 10 %
MYELOCYTES NFR BLD MANUAL: 1 %
NEUTROPHILS NFR BLD: 77 %
NEUTS BAND NFR BLD MANUAL: 2 %
NRBC BLD-RTO: 3 /100 WBC
OVALOCYTES BLD QL SMEAR: ABNORMAL
PLATELET # BLD AUTO: 126 K/UL
PLATELET BLD QL SMEAR: ABNORMAL
PMV BLD AUTO: 11.4 FL
POIKILOCYTOSIS BLD QL SMEAR: SLIGHT
POLYCHROMASIA BLD QL SMEAR: ABNORMAL
POTASSIUM SERPL-SCNC: 3.8 MMOL/L
PROT SERPL-MCNC: 6.7 G/DL
RBC # BLD AUTO: 3.35 M/UL
SODIUM SERPL-SCNC: 140 MMOL/L
WBC # BLD AUTO: 5.89 K/UL

## 2018-10-01 PROCEDURE — 36415 COLL VENOUS BLD VENIPUNCTURE: CPT

## 2018-10-01 PROCEDURE — 80053 COMPREHEN METABOLIC PANEL: CPT

## 2018-10-01 RX ORDER — OXYCODONE AND ACETAMINOPHEN 5; 325 MG/1; MG/1
TABLET ORAL
Qty: 20 TABLET | Refills: 0 | Status: SHIPPED | OUTPATIENT
Start: 2018-10-01

## 2018-10-02 DIAGNOSIS — T38.0X5A STEROID-INDUCED HYPERGLYCEMIA: ICD-10-CM

## 2018-10-02 DIAGNOSIS — R73.9 STEROID-INDUCED HYPERGLYCEMIA: ICD-10-CM

## 2018-10-02 RX ORDER — INSULIN ASPART 100 [IU]/ML
4 INJECTION, SOLUTION INTRAVENOUS; SUBCUTANEOUS
Qty: 10.8 ML | Refills: 3 | Status: ON HOLD | OUTPATIENT
Start: 2018-10-02 | End: 2018-11-01

## 2018-10-04 ENCOUNTER — INFUSION (OUTPATIENT)
Dept: INFUSION THERAPY | Facility: HOSPITAL | Age: 63
End: 2018-10-04
Attending: INTERNAL MEDICINE
Payer: COMMERCIAL

## 2018-10-04 ENCOUNTER — LAB VISIT (OUTPATIENT)
Dept: LAB | Facility: HOSPITAL | Age: 63
End: 2018-10-04
Attending: INTERNAL MEDICINE
Payer: COMMERCIAL

## 2018-10-04 DIAGNOSIS — C85.90 T-CELL LYMPHOMA: Primary | ICD-10-CM

## 2018-10-04 DIAGNOSIS — C85.90 T-CELL LYMPHOMA: ICD-10-CM

## 2018-10-04 LAB
ALBUMIN SERPL BCP-MCNC: 3.6 G/DL
ALP SERPL-CCNC: 84 U/L
ALT SERPL W/O P-5'-P-CCNC: 12 U/L
ANION GAP SERPL CALC-SCNC: 8 MMOL/L
AST SERPL-CCNC: 14 U/L
BASOPHILS # BLD AUTO: 0.04 K/UL
BASOPHILS NFR BLD: 0.6 %
BILIRUB SERPL-MCNC: 0.4 MG/DL
BUN SERPL-MCNC: 6 MG/DL
CALCIUM SERPL-MCNC: 9.6 MG/DL
CHLORIDE SERPL-SCNC: 107 MMOL/L
CO2 SERPL-SCNC: 27 MMOL/L
CREAT SERPL-MCNC: 0.8 MG/DL
DIFFERENTIAL METHOD: ABNORMAL
EOSINOPHIL # BLD AUTO: 0 K/UL
EOSINOPHIL NFR BLD: 0.2 %
ERYTHROCYTE [DISTWIDTH] IN BLOOD BY AUTOMATED COUNT: 17.1 %
EST. GFR  (AFRICAN AMERICAN): >60 ML/MIN/1.73 M^2
EST. GFR  (NON AFRICAN AMERICAN): >60 ML/MIN/1.73 M^2
GLUCOSE SERPL-MCNC: 133 MG/DL
HCT VFR BLD AUTO: 32.6 %
HGB BLD-MCNC: 10.2 G/DL
IMM GRANULOCYTES # BLD AUTO: 0.22 K/UL
IMM GRANULOCYTES NFR BLD AUTO: 3.4 %
LYMPHOCYTES # BLD AUTO: 0.9 K/UL
LYMPHOCYTES NFR BLD: 13 %
MCH RBC QN AUTO: 29.7 PG
MCHC RBC AUTO-ENTMCNC: 31.3 G/DL
MCV RBC AUTO: 95 FL
MONOCYTES # BLD AUTO: 0.9 K/UL
MONOCYTES NFR BLD: 14.3 %
NEUTROPHILS # BLD AUTO: 4.5 K/UL
NEUTROPHILS NFR BLD: 68.5 %
NRBC BLD-RTO: 1 /100 WBC
PLATELET # BLD AUTO: 260 K/UL
PMV BLD AUTO: 10.5 FL
POTASSIUM SERPL-SCNC: 3.9 MMOL/L
PROT SERPL-MCNC: 6.7 G/DL
RBC # BLD AUTO: 3.43 M/UL
SODIUM SERPL-SCNC: 142 MMOL/L
WBC # BLD AUTO: 6.52 K/UL

## 2018-10-04 PROCEDURE — 63600175 PHARM REV CODE 636 W HCPCS: Performed by: INTERNAL MEDICINE

## 2018-10-04 PROCEDURE — 80053 COMPREHEN METABOLIC PANEL: CPT

## 2018-10-04 PROCEDURE — 36415 COLL VENOUS BLD VENIPUNCTURE: CPT

## 2018-10-04 PROCEDURE — 25000003 PHARM REV CODE 250: Performed by: INTERNAL MEDICINE

## 2018-10-04 PROCEDURE — 96523 IRRIG DRUG DELIVERY DEVICE: CPT

## 2018-10-04 PROCEDURE — A4216 STERILE WATER/SALINE, 10 ML: HCPCS | Performed by: INTERNAL MEDICINE

## 2018-10-04 PROCEDURE — 85025 COMPLETE CBC W/AUTO DIFF WBC: CPT

## 2018-10-04 RX ORDER — HEPARIN 100 UNIT/ML
500 SYRINGE INTRAVENOUS
Status: CANCELLED | OUTPATIENT
Start: 2018-10-04

## 2018-10-04 RX ORDER — SODIUM CHLORIDE 0.9 % (FLUSH) 0.9 %
10 SYRINGE (ML) INJECTION
Status: COMPLETED | OUTPATIENT
Start: 2018-10-04 | End: 2018-10-04

## 2018-10-04 RX ORDER — HEPARIN 100 UNIT/ML
500 SYRINGE INTRAVENOUS
Status: COMPLETED | OUTPATIENT
Start: 2018-10-04 | End: 2018-10-04

## 2018-10-04 RX ORDER — SODIUM CHLORIDE 0.9 % (FLUSH) 0.9 %
10 SYRINGE (ML) INJECTION
Status: CANCELLED | OUTPATIENT
Start: 2018-10-04

## 2018-10-04 RX ADMIN — HEPARIN 500 UNITS: 100 SYRINGE at 08:10

## 2018-10-04 RX ADMIN — SODIUM CHLORIDE, PRESERVATIVE FREE 10 ML: 5 INJECTION INTRAVENOUS at 08:10

## 2018-10-08 ENCOUNTER — OFFICE VISIT (OUTPATIENT)
Dept: HEMATOLOGY/ONCOLOGY | Facility: CLINIC | Age: 63
End: 2018-10-08
Payer: COMMERCIAL

## 2018-10-08 ENCOUNTER — LAB VISIT (OUTPATIENT)
Dept: LAB | Facility: HOSPITAL | Age: 63
End: 2018-10-08
Attending: INTERNAL MEDICINE
Payer: COMMERCIAL

## 2018-10-08 ENCOUNTER — TELEPHONE (OUTPATIENT)
Dept: HEMATOLOGY/ONCOLOGY | Facility: CLINIC | Age: 63
End: 2018-10-08

## 2018-10-08 VITALS — DIASTOLIC BLOOD PRESSURE: 68 MMHG | SYSTOLIC BLOOD PRESSURE: 114 MMHG | HEART RATE: 76 BPM

## 2018-10-08 DIAGNOSIS — C85.90 T-CELL LYMPHOMA: Primary | ICD-10-CM

## 2018-10-08 DIAGNOSIS — C85.90 T-CELL LYMPHOMA: ICD-10-CM

## 2018-10-08 LAB
ALBUMIN SERPL BCP-MCNC: 3.7 G/DL
ALP SERPL-CCNC: 83 U/L
ALT SERPL W/O P-5'-P-CCNC: 9 U/L
ANION GAP SERPL CALC-SCNC: 8 MMOL/L
AST SERPL-CCNC: 14 U/L
BASOPHILS # BLD AUTO: 0.06 K/UL
BASOPHILS NFR BLD: 1.3 %
BILIRUB SERPL-MCNC: 0.5 MG/DL
BUN SERPL-MCNC: 6 MG/DL
CALCIUM SERPL-MCNC: 9.5 MG/DL
CHLORIDE SERPL-SCNC: 106 MMOL/L
CO2 SERPL-SCNC: 26 MMOL/L
CREAT SERPL-MCNC: 0.8 MG/DL
DIFFERENTIAL METHOD: ABNORMAL
EOSINOPHIL # BLD AUTO: 0 K/UL
EOSINOPHIL NFR BLD: 0.2 %
ERYTHROCYTE [DISTWIDTH] IN BLOOD BY AUTOMATED COUNT: 17.2 %
EST. GFR  (AFRICAN AMERICAN): >60 ML/MIN/1.73 M^2
EST. GFR  (NON AFRICAN AMERICAN): >60 ML/MIN/1.73 M^2
GLUCOSE SERPL-MCNC: 107 MG/DL
HCT VFR BLD AUTO: 34.8 %
HGB BLD-MCNC: 11 G/DL
IMM GRANULOCYTES # BLD AUTO: 0.04 K/UL
IMM GRANULOCYTES NFR BLD AUTO: 0.9 %
LYMPHOCYTES # BLD AUTO: 1.1 K/UL
LYMPHOCYTES NFR BLD: 23.1 %
MCH RBC QN AUTO: 30.3 PG
MCHC RBC AUTO-ENTMCNC: 31.6 G/DL
MCV RBC AUTO: 96 FL
MONOCYTES # BLD AUTO: 0.9 K/UL
MONOCYTES NFR BLD: 19.8 %
NEUTROPHILS # BLD AUTO: 2.5 K/UL
NEUTROPHILS NFR BLD: 54.7 %
NRBC BLD-RTO: 1 /100 WBC
PLATELET # BLD AUTO: 419 K/UL
PMV BLD AUTO: 9.9 FL
POTASSIUM SERPL-SCNC: 3.9 MMOL/L
PROT SERPL-MCNC: 6.9 G/DL
RBC # BLD AUTO: 3.63 M/UL
SODIUM SERPL-SCNC: 140 MMOL/L
WBC # BLD AUTO: 4.64 K/UL

## 2018-10-08 PROCEDURE — 99999 PR PBB SHADOW E&M-EST. PATIENT-LVL III: CPT | Mod: PBBFAC,,, | Performed by: INTERNAL MEDICINE

## 2018-10-08 PROCEDURE — 36415 COLL VENOUS BLD VENIPUNCTURE: CPT

## 2018-10-08 PROCEDURE — 85025 COMPLETE CBC W/AUTO DIFF WBC: CPT

## 2018-10-08 PROCEDURE — 80053 COMPREHEN METABOLIC PANEL: CPT

## 2018-10-08 PROCEDURE — 99214 OFFICE O/P EST MOD 30 MIN: CPT | Mod: S$GLB,,, | Performed by: INTERNAL MEDICINE

## 2018-10-08 NOTE — TELEPHONE ENCOUNTER
Return patients call let him know that we scheduled a pet for Thursday. Also informed him that the chemo appointments has been changed.      ----- Message from Michelle Fine sent at 10/8/2018 10:47 AM CDT -----  Contact: Pt  Patient Returning Call from [facility]    Who Left Message for Patient:Sammie Romero  Communication Preference:Phone [phone]  Additional Information:  Thank You  MONI Fine

## 2018-10-08 NOTE — PROGRESS NOTES
HISTORY OF PRESENT ILLNESS:  Mr. Mcpherson is a 63-year-old man who is being   treated for peripheral T-cell lymphoma.  At the time of diagnosis, it appeared   that all of the disease was confined to his neck and most of it was surgically   removed.  A PET scan in June 2018 was normal except for a lymph node just to the   left of midline in the upper neck with an SUV max of 18.2.  That node was   palpable, but it disappeared after the first course of therapy.  His   pretreatment LDH was normal at 175.    He has now completed five courses of dose adjusted EPOCH.  The doses of   cyclophosphamide and etoposide have been increased twice based on neutrophil   counts and platelet counts.  I did not increase Adriamycin because of my concern   that he likely has a slightly higher than normal risk of future cardiac   disease.  Also, the dosage of vincristine was decreased slightly because of   diabetes mellitus and numbness in his feet.  The numbness in the feet has not   increased.    With the last treatment, he had more fatigue than he has experienced in the   past.  This lasted for about eight days and he was considerably less active than   usual.  He again had aching in the bones and joints, particularly his back and   legs and this was particularly troublesome for about three days.  Those symptoms   are likely a consequence of Neulasta.    His platelet count declined to 39,000 and then 31,000 and has now increased to   normal.  The neutrophil count declined to 440 and then 300 and now has increased   to 4500.    He has not noted any change in his neck, although he did point out slight thickening of the   tissues in his left neck just below his mid mandibular area.  He has not had   fevers or sweats.    ADDITIONAL PAST HISTORY, SYSTEM REVIEW, SOCIAL HISTORY AND FAMILY HISTORY:  Have   been reviewed and updated in the electronic record.    PHYSICAL EXAMINATION:  GENERAL:  A very well-developed and well-nourished man in no  distress.  EYES:  No jaundice or pallor.  MOUTH AND THROAT:  Good dentition.  No mucosal lesions.  NECK:  Left facial palsy.  There is more induration around the surgical scar   than I noted in the past.  Tissues posterior to the scar have a more nodular   character than at the time of previous examinations.  All of this   nodularity is around the surgical scars and it may all represent scar tissue   from his prior surgery, but the nodularity is troublesome to me.  LYMPH NODES:  No enlarged axillary or inguinal nodes.  CHEST AND LUNGS:  Normal respiratory effort.  Clear to auscultation and   percussion.  HEART:  Regular rate and rhythm without murmur or gallop.  ABDOMEN:  Soft without masses or tenderness.  No hepatosplenomegaly.  PERIPHERAL VASCULATURE:  Good pulses in the feet and ankles.  NEUROLOGIC:  Mental status is good.  Motor function is normal.  Mild left facial   nerve palsy.    LABORATORY STUDIES:  Blood counts include hemoglobin 11.0, platelets 419,000 and   WBC 4640.  Comprehensive metabolic profile is normal with the exception of   glucose 133.    IMPRESSION:  1.  Stage IE peripheral T-cell lymphoma.  2.  Mild stable peripheral neuropathy.  3.  Steroid-induced hyperglycemia.    PLAN:  1.  Because of the change in my findings in his neck, I am going to order   another PET scan today.  His chemotherapy will be postponed for one week.  2.  If the PET scan shows no evidence of disease in the neck or elsewhere, he   will be treated with a sixth and final course of EPOCH starting in one week.  3.  As in the past, he will take Levaquin 750 mg daily for 10 days starting on   day 6 and he will have a CBC every Monday and Thursday.  4.  If the treatment does begin next week, I would like for him to have a return   appointment with Dr. Miller in Radiation Oncology between three and four weeks   after that treatment.  I also would like to see him about the same time with   CBC and CMP.  5.  I will notify   Ky about the results of his PET scan.  6.  If he does proceed with the sixth course of treatment and if the PET scan is   negative, the PICC line can be removed approximately three weeks after the   completion of that sixth and final course of therapy.      NABIL/AZALIA  dd: 10/08/2018 09:42:20 (CDT)  td: 10/09/2018 05:37:50 (CDT)  Doc ID   #[id number]  Job ID #[id number]    CC:

## 2018-10-08 NOTE — TELEPHONE ENCOUNTER
Left message on voice mail to call the office to confirm PET scan appt. Number was provided.      ----- Message from Love Ricardo RN sent at 10/8/2018  9:32 AM CDT -----  PET scan this week

## 2018-10-11 ENCOUNTER — TELEPHONE (OUTPATIENT)
Dept: HEMATOLOGY/ONCOLOGY | Facility: HOSPITAL | Age: 63
End: 2018-10-11

## 2018-10-11 ENCOUNTER — INFUSION (OUTPATIENT)
Dept: INFUSION THERAPY | Facility: HOSPITAL | Age: 63
End: 2018-10-11
Attending: INTERNAL MEDICINE
Payer: COMMERCIAL

## 2018-10-11 ENCOUNTER — HOSPITAL ENCOUNTER (OUTPATIENT)
Dept: RADIOLOGY | Facility: HOSPITAL | Age: 63
Discharge: HOME | End: 2018-10-11
Attending: INTERNAL MEDICINE
Payer: COMMERCIAL

## 2018-10-11 DIAGNOSIS — C85.90 T-CELL LYMPHOMA: ICD-10-CM

## 2018-10-11 DIAGNOSIS — C85.90 T-CELL LYMPHOMA: Primary | ICD-10-CM

## 2018-10-11 LAB — POCT GLUCOSE: 95 MG/DL (ref 70–110)

## 2018-10-11 PROCEDURE — 78815 PET IMAGE W/CT SKULL-THIGH: CPT | Mod: TC

## 2018-10-11 PROCEDURE — 96523 IRRIG DRUG DELIVERY DEVICE: CPT

## 2018-10-11 PROCEDURE — A4216 STERILE WATER/SALINE, 10 ML: HCPCS | Performed by: INTERNAL MEDICINE

## 2018-10-11 PROCEDURE — 63600175 PHARM REV CODE 636 W HCPCS: Performed by: INTERNAL MEDICINE

## 2018-10-11 PROCEDURE — A9552 F18 FDG: HCPCS

## 2018-10-11 PROCEDURE — 78815 PET IMAGE W/CT SKULL-THIGH: CPT | Mod: 26,PS,, | Performed by: RADIOLOGY

## 2018-10-11 PROCEDURE — 25000003 PHARM REV CODE 250: Performed by: INTERNAL MEDICINE

## 2018-10-11 RX ORDER — HEPARIN 100 UNIT/ML
500 SYRINGE INTRAVENOUS
Status: CANCELLED | OUTPATIENT
Start: 2018-10-11

## 2018-10-11 RX ORDER — SODIUM CHLORIDE 0.9 % (FLUSH) 0.9 %
10 SYRINGE (ML) INJECTION
Status: CANCELLED | OUTPATIENT
Start: 2018-10-11

## 2018-10-11 RX ORDER — HEPARIN 100 UNIT/ML
500 SYRINGE INTRAVENOUS
Status: COMPLETED | OUTPATIENT
Start: 2018-10-11 | End: 2018-10-11

## 2018-10-11 RX ORDER — SODIUM CHLORIDE 0.9 % (FLUSH) 0.9 %
10 SYRINGE (ML) INJECTION
Status: COMPLETED | OUTPATIENT
Start: 2018-10-11 | End: 2018-10-11

## 2018-10-11 RX ADMIN — Medication 10 ML: at 08:10

## 2018-10-11 RX ADMIN — HEPARIN 500 UNITS: 100 SYRINGE at 08:10

## 2018-10-11 NOTE — TELEPHONE ENCOUNTER
Told patient and wife that the PET shows signs of disease progression in his neck--at the initial surgery site and in other neck nodes.  This disease is clearly aggressive, so the initial plan of chemotherapy and then probable RT for stage IA disease is no longer feasible.  He will need additional therapy for T cell lymphoma and, if possible, an allo transplant (he has 5 sibs).  I have asked Dr. Rao to see him and take over his management.    Kodi Mcgill

## 2018-10-12 ENCOUNTER — PATIENT MESSAGE (OUTPATIENT)
Dept: HEMATOLOGY/ONCOLOGY | Facility: CLINIC | Age: 63
End: 2018-10-12

## 2018-10-15 ENCOUNTER — OFFICE VISIT (OUTPATIENT)
Dept: HEMATOLOGY/ONCOLOGY | Facility: CLINIC | Age: 63
End: 2018-10-15
Payer: COMMERCIAL

## 2018-10-15 VITALS
TEMPERATURE: 98 F | SYSTOLIC BLOOD PRESSURE: 134 MMHG | OXYGEN SATURATION: 98 % | BODY MASS INDEX: 27.7 KG/M2 | WEIGHT: 215.81 LBS | HEIGHT: 74 IN | DIASTOLIC BLOOD PRESSURE: 85 MMHG | RESPIRATION RATE: 18 BRPM | HEART RATE: 88 BPM

## 2018-10-15 DIAGNOSIS — G60.9 IDIOPATHIC PERIPHERAL NEUROPATHY: ICD-10-CM

## 2018-10-15 DIAGNOSIS — C85.90 T-CELL LYMPHOMA: Primary | ICD-10-CM

## 2018-10-15 PROCEDURE — 3008F BODY MASS INDEX DOCD: CPT | Mod: CPTII,S$GLB,, | Performed by: INTERNAL MEDICINE

## 2018-10-15 PROCEDURE — 99999 PR PBB SHADOW E&M-EST. PATIENT-LVL IV: CPT | Mod: PBBFAC,,, | Performed by: INTERNAL MEDICINE

## 2018-10-15 PROCEDURE — 99214 OFFICE O/P EST MOD 30 MIN: CPT | Mod: S$GLB,,, | Performed by: INTERNAL MEDICINE

## 2018-10-15 RX ORDER — EPINEPHRINE 0.3 MG/.3ML
0.3 INJECTION SUBCUTANEOUS ONCE AS NEEDED
Status: CANCELLED | OUTPATIENT
Start: 2018-10-25 | End: 2018-10-25

## 2018-10-15 RX ORDER — SODIUM CHLORIDE 0.9 % (FLUSH) 0.9 %
10 SYRINGE (ML) INJECTION
Status: CANCELLED | OUTPATIENT
Start: 2018-10-25

## 2018-10-15 RX ORDER — SODIUM CHLORIDE 0.9 % (FLUSH) 0.9 %
10 SYRINGE (ML) INJECTION
Status: CANCELLED | OUTPATIENT
Start: 2018-10-24

## 2018-10-15 RX ORDER — HEPARIN 100 UNIT/ML
500 SYRINGE INTRAVENOUS
Status: CANCELLED | OUTPATIENT
Start: 2018-10-25

## 2018-10-15 RX ORDER — HEPARIN 100 UNIT/ML
500 SYRINGE INTRAVENOUS
Status: CANCELLED | OUTPATIENT
Start: 2018-10-24

## 2018-10-15 RX ORDER — ACYCLOVIR 800 MG/1
800 TABLET ORAL
Qty: 50 TABLET | Refills: 0 | Status: ON HOLD | OUTPATIENT
Start: 2018-10-15 | End: 2018-11-01 | Stop reason: HOSPADM

## 2018-10-15 RX ORDER — DIPHENHYDRAMINE HYDROCHLORIDE 50 MG/ML
50 INJECTION INTRAMUSCULAR; INTRAVENOUS ONCE AS NEEDED
Status: CANCELLED | OUTPATIENT
Start: 2018-10-25 | End: 2018-10-25

## 2018-10-15 RX ORDER — EPINEPHRINE 0.3 MG/.3ML
0.3 INJECTION SUBCUTANEOUS ONCE AS NEEDED
Status: CANCELLED | OUTPATIENT
Start: 2018-10-24 | End: 2018-10-24

## 2018-10-15 RX ORDER — PANTOPRAZOLE SODIUM 40 MG/1
40 TABLET, DELAYED RELEASE ORAL DAILY
Qty: 30 TABLET | Refills: 1 | Status: ON HOLD | OUTPATIENT
Start: 2018-10-15 | End: 2018-11-01 | Stop reason: CLARIF

## 2018-10-15 RX ORDER — MEPERIDINE HYDROCHLORIDE 50 MG/ML
25 INJECTION INTRAMUSCULAR; INTRAVENOUS; SUBCUTANEOUS
Status: CANCELLED | OUTPATIENT
Start: 2018-10-24

## 2018-10-15 RX ORDER — DIPHENHYDRAMINE HYDROCHLORIDE 50 MG/ML
50 INJECTION INTRAMUSCULAR; INTRAVENOUS ONCE AS NEEDED
Status: CANCELLED | OUTPATIENT
Start: 2018-10-24 | End: 2018-10-24

## 2018-10-15 RX ORDER — ACETAMINOPHEN 325 MG/1
650 TABLET ORAL
Status: CANCELLED | OUTPATIENT
Start: 2018-10-24

## 2018-10-15 RX ORDER — DEXAMETHASONE 4 MG/1
40 TABLET ORAL SEE ADMIN INSTRUCTIONS
Qty: 120 TABLET | Refills: 0 | Status: ON HOLD | OUTPATIENT
Start: 2018-10-15 | End: 2018-11-01

## 2018-10-15 RX ORDER — FAMOTIDINE 10 MG/ML
20 INJECTION INTRAVENOUS
Status: CANCELLED | OUTPATIENT
Start: 2018-10-24

## 2018-10-15 NOTE — PROGRESS NOTES
CC: Peripheral t cell lymphoma, NOS    Referred by:     HPI: Mr. Mcpherson is a 63-year-old man who was being treated for peripheral T-cell lymphoma by .  At the time of diagnosis, it appeared that all of the disease was confined to his neck and most of it was surgically removed.  A PET scan in June 2018 was normal except for a lymph node just to the  left of midline in the upper neck with an SUV max of 18.2.  That node was palpable, but it disappeared after the first course of therapy.  His pretreatment LDH was normal at 175.     He has completed five courses of dose adjusted EPOCH.  The doses of cyclophosphamide and etoposide have been increased twice based on neutrophil counts and platelet counts.  Adriamycin not incrased because higher than normal risk of future cardiac disease.  Also, the dosage of vincristine was decreased slightly because of diabetes mellitus and numbness in his feet.  The numbness in the feet has not increased. PET CT done on 10/11/18.          Review of Systems   Constitutional: Negative for chills and fever.   HENT: Negative for congestion, ear discharge and nosebleeds.    Eyes: Negative for blurred vision, double vision and photophobia.   Respiratory: Negative for cough, hemoptysis and sputum production.    Cardiovascular: Negative for chest pain, palpitations, orthopnea and claudication.   Gastrointestinal: Negative for abdominal pain, heartburn, nausea and vomiting.   Genitourinary: Negative for dysuria, frequency and urgency.   Musculoskeletal: Negative for back pain, myalgias and neck pain.   Skin: Negative for itching and rash.   Neurological: Positive for tingling. Negative for dizziness, tremors and headaches.   Endo/Heme/Allergies: Does not bruise/bleed easily.   Psychiatric/Behavioral: Negative for depression and suicidal ideas.          Past Medical History:   Diagnosis Date    Hearing loss     Peripheral neuropathy 6/19/2018    Pre-diabetes          Past  Surgical History:   Procedure Laterality Date    DISSECTION OF NECK Left 5/30/2018    Procedure: DISSECTION, NECK;  Surgeon: Regan King MD;  Location: [facility] OR [address] [place] [address];  Service: ENT;  Laterality: Left;    DISSECTION, NECK Left 5/30/2018    Performed by Regan King MD at [facility] OR [address] [place] [address]    EXCISION OF PAROTID GLAND Left 5/30/2018    Procedure: EXCISION, PAROTID GLAND;  Surgeon: Regan King MD;  Location: [facility] OR [address] [place] [address];  Service: ENT;  Laterality: Left;    EXCISION, PAROTID GLAND Left 5/30/2018    Performed by Regan King MD at [facility] OR [address] [place] [address]    KNEE SURGERY Right          Social History     Socioeconomic History    Marital status:      Spouse name: Not on file    Number of children: Not on file    Years of education: Not on file    Highest education level: Not on file   Social Needs    Financial resource strain: Not on file    Food insecurity - worry: Not on file    Food insecurity - inability: Not on file    Transportation needs - medical: Not on file    Transportation needs - non-medical: Not on file   Occupational History    Not on file   Tobacco Use    Smoking status: Former Smoker     Types: Cigars    Smokeless tobacco: Never Used    Tobacco comment: 1 cigar a week   Substance and Sexual Activity    Alcohol use: Yes     Alcohol/week: 0.6 oz     Types: 1 Shots of liquor per week     Comment: 2 per month    Drug use: No    Sexual activity: Not on file   Other Topics Concern    Not on file   Social History Narrative    Not on file         Review of patient's allergies indicates: No Known Allergies      Current Outpatient Medications   Medication Sig    bacitracin 500 unit/gram ointment Apply topically every 12 (twelve) hours.    blood sugar diagnostic Strp To check BG 4 times daily, to use with insurance preferred meter    blood-glucose meter kit To check BG 4 times daily, to use with insurance preferred meter    cephALEXin (KEFLEX) 500 MG  "capsule Take 1 capsule (500 mg total) by mouth every 8 (eight) hours.    insulin aspart U-100 (NOVOLOG FLEXPEN U-100 INSULIN) 100 unit/mL InPn pen Inject 4 Units into the skin 3 (three) times daily with meals.    lactulose (CHRONULAC) 20 gram/30 mL Soln 2 tablespoons every hour until bowels more and then use as needed    lancets Misc To check BG 4 times daily, to use with insurance preferred meter    levoFLOXacin (LEVAQUIN) 750 MG tablet One daily for 10 days, starting 6 days after FIRST day of chemotherapy    magic mouthwash diphen/antac/lidoc/nysta Swish and spit 10 mls every 6 hours    metFORMIN (GLUCOPHAGE) 500 MG tablet Take 2 tablets (1,000 mg total) by mouth 2 (two) times daily with meals.    ondansetron (ZOFRAN) 8 MG tablet Take 1 tablet (8 mg total) by mouth every 8 (eight) hours as needed for Nausea.    oxyCODONE-acetaminophen (PERCOCET) 5-325 mg per tablet 1 or 2 tablets every 6 hours as needed for pain    pen needle, diabetic (BD ULTRA-FINE CORI PEN NEEDLE) 32 gauge x 5/32" Ndle To use 3 time daily with insulin    predniSONE (DELTASONE) 50 MG Tab 2 tablets daily for 5 days, starting on first day of each chemotherapy course    senna-docusate 8.6-50 mg (PERICOLACE) 8.6-50 mg per tablet Take 1 tablet by mouth 2 (two) times daily.     No current facility-administered medications for this visit.      Facility-Administered Medications Ordered in Other Visits   Medication    heparin, porcine (PF) 100 unit/mL injection flush 500 Units    sodium chloride 0.9% flush 10 mL         Vitals:    10/15/18 1555   BP: 134/85   Pulse: 88   Resp: 18   Temp: 98.4 °F (36.9 °C)     PS: ECOG1    Physical Exam   Constitutional: He is oriented to person, place, and time. He appears well-developed. No distress.   HENT:   Head: Normocephalic and atraumatic.   Mouth/Throat: No oropharyngeal exudate.   Eyes: Pupils are equal, round, and reactive to light. No scleral icterus.   Neck: Normal range of motion.   Left " cervical adenopathy present, non tender. It extends from the left auricle to almost midline at the level of thyroid cartilage. It is firm to hard to palpate.     Cardiovascular: Normal rate, regular rhythm and normal heart sounds.   No murmur heard.  Pulmonary/Chest: Effort normal and breath sounds normal. No respiratory distress. He has no wheezes.   Abdominal: Soft. Bowel sounds are normal. He exhibits no distension. There is no tenderness.   Musculoskeletal: He exhibits no edema.   Lymphadenopathy:     He has cervical adenopathy.   Neurological: He is oriented to person, place, and time.   Skin: Skin is warm.         5/30/18 pathology    SPECIMEN  1) Left neck mass.  2) Left level 2 lymph node.  3) Left radical parotidectomy sent for flow cytometry.  4) Left neck dissection level 2.  5) Left neck dissection level 3.  6) Left neck dissection level 4.  FINAL PATHOLOGIC DIAGNOSIS  1-6 LEFT NECK MASS, LEFT LEVEL 2, 3, 4, LYMPH NODES, LEFT RADICAL PAROTIDECTOMY: PERIPHERAL T-CELL LYMPHOMA, NOS. SEE COMMENT.  Comment: Fragments of lymph node and the salivary gland tissue are infiltrated with large atypical lymphocytes with prominent nucleoli.  Flow cytometric analysis of tissue shows mostly T lymphocytes that are lost expression of CD 4, CD7, and CD8.  The blast gate is not increased. However, cell viability is really low.  Flow differential: Lymphocytes 15.4%, Monocytes 4.2%, Granulocytes 70.9%, Blast 3.0%, Debris/nRBC 5.2%,Viability 20.3%.  Immunohistochemical studies were performed on the 3 G and 6B with adequate positive and negative controls.  The large atypical lymphocytes are positive for CD3, CD8(focal), CD45, high Ki-67(90%) and are negative for CD4,CD10, CD20, [place]-5, CD30, CD34, TDT, CD56, , , HMB-45, and AE1/AE3. In situ hybridization for EBV is negative.  Findings are consistent with peripheral T-cell lymphoma, NOS.      6/20/18 bone marrow biopsy      CELLULARITY=40-60%, TRILINEAGE HEMATOPOIETIC  ACTIVITY (M/E=1.4:1).  NO DEFINITIVE MORPHOLOGIC EVIDENCE OF T-CELL LYMPHOMA. SEE COMMENT.  INCREASED STORAGE IRON.  ADEQUATE NUMBER OF MEGAKARYOCYTES.  COMMENT: Flow cytometry analysis of bone marrow aspirate shows lymph gate(29.6%) containing T and B cells.   No B cell clonality or T-cell aberrancy is evident. Blast gate is not increased. Plasma cell study shows no light chain restricted plasma cell population.  Immunohistochemical studies were performed on the clot and core biopsy for further architecture evaluation with adequate positive and negative controls. Scattered mixed predominantly T cells (CD3 positive) and B cells (CD20 positive) are evident. Findings are nondiagnostic for lymphoma. Correlate clinically and with a cytogenetics report.      10/11/18 PET CT      COMPARISON:  PET-CT 06/15/2018    FINDINGS:  Quality of the study: Adequate.    Postoperative findings left neck dissection. There is ill-defined hypermetabolic soft tissue within the dissection bed extending anterior to the left mandible with markedly increased FDG avidity and SUV max 14.4.  There is additional hypermetabolic ill-defined soft tissue in the posterior left neck.    Hypermetabolic left submental lymph node has resolved; however, there are now hypermetabolic right submental lymph nodes with an SUV max 10.3.  There has also been development of hypermetabolic left posterior cervical, left supraclavicular, left axillary lymph nodes, and a tiny right cervical node.    Physiologic uptake of the tracer is present within the brain, salivary glands, myocardium, GI and  tracts.    Incidental CT findings: There is a port in the left arm with a transvenous catheter extending to the SVC.  Stable focal cortical defect in right kidney.      Impression       Progression of disease with increased hypermetabolic soft tissue in the operative bed in addition to bilateral cervical and left axillary lymphadenopathy.           Component      Latest Ref  Rng & Units 10/11/2018   WBC      3.90 - 12.70 K/uL 4.31   RBC      4.60 - 6.20 M/uL 3.51 (L)   Hemoglobin      14.0 - 18.0 g/dL 10.4 (L)   Hematocrit      40.0 - 54.0 % 33.5 (L)   MCV      82 - 98 fL 95   MCH      27.0 - 31.0 pg 29.6   MCHC      32.0 - 36.0 g/dL 31.0 (L)   RDW      11.5 - 14.5 % 17.3 (H)   Platelets      150 - 350 K/uL 405 (H)   MPV      9.2 - 12.9 fL 9.2   Immature Granulocytes      0.0 - 0.5 % 0.5   Gran # (ANC)      1.8 - 7.7 K/uL 2.3   Immature Grans (Abs)      0.00 - 0.04 K/uL 0.02   Lymph #      1.0 - 4.8 K/uL 1.0   Mono #      0.3 - 1.0 K/uL 0.9   Eos #      0.0 - 0.5 K/uL 0.0   Baso #      0.00 - 0.20 K/uL 0.08   nRBC      0 /100 WBC 1 (A)   Gran%      38.0 - 73.0 % 53.5   Lymph%      18.0 - 48.0 % 22.5   Mono%      4.0 - 15.0 % 21.1 (H)   Eosinophil%      0.0 - 8.0 % 0.5   Basophil%      0.0 - 1.9 % 1.9   Differential Method       Automated   Sodium      136 - 145 mmol/L 138   Potassium      3.5 - 5.1 mmol/L 3.8   Chloride      95 - 110 mmol/L 103   CO2      23 - 29 mmol/L 29   Glucose      70 - 110 mg/dL 109   BUN, Bld      8 - 23 mg/dL 7 (L)   Creatinine      0.5 - 1.4 mg/dL 0.9   Calcium      8.7 - 10.5 mg/dL 10.2   Total Protein      6.0 - 8.4 g/dL 6.7   Albumin      3.5 - 5.2 g/dL 3.7   Total Bilirubin      0.1 - 1.0 mg/dL 0.6   Alkaline Phosphatase      55 - 135 U/L 76   AST      10 - 40 U/L 14   ALT      10 - 44 U/L 10   Anion Gap      8 - 16 mmol/L 6 (L)   eGFR if African American      >60 mL/min/1.73 m:2 >60.0   eGFR if non African American      >60 mL/min/1.73 m:2 >60.0         Assessment:  1. Refractory peripheral t cell lymphoma NOS  2. Cervical adenopathy  3. Peripheral neuropathy  4. Diabetes mellitus type 2    Plan:    1,2. Discussed the prognosis and treatment options in detail with the patient and his spouse today. I explained that peripheral t cell lymphoma is a very aggressive form of malignancy, especially when it is treatment/chemotherapy refractory.Available optiona  include salvage chemotherapy followed by auto or allo SCT. He has 3 brothers and 2 sisters. Overall his performance status is good, and has limited co-morbidities. HCT-CI is 1.  Auto SCT is an option, but only if he has very good response to salvage chemotherapy.  Case will be discussed at this week's BMT meeting.We do  Not have any clinical trials at this time for T cell lymphoma. H eis planning to go to [facility] next week. Options include clinicla trial there, including CART therapy.    I dicussed salvage treatment with Gemcitabine-Oxalipaltin-Decadron.  There are several small studies which evaluated the response to gem-Ox in R/R T cell lymphoma.  Overall response rate (JUAREZ) is between 30-50% , with rare complete responses . Most are seven partial responses).  Other agents, like Paratrexate/ Romidepsin/Belinostat have similar response rates.  No superiority has been established compared to conventional salvage chemotherapy options.  I explained that peripheral neuropathy might be a problem with Oxaliplatin. He has grade 1 neuropathy in his hands and feet that pre-dated EPOCH.  Myelosuppression, infusion/allergic reactions, infection risk, nausea, diarrhea, were all discussed.     Acyclovir prescription sent, to start with chemnotherapy for HZV prophylaxis. Decadron and Protonix prescriptions sent.     3. Grade 1-2. No intervention at this time.    4. On Metformin    Distress Screening Results: Psychosocial Distress screening score of Distress Score: 1 noted and reviewed. No intervention indicated.

## 2018-10-18 ENCOUNTER — PATIENT MESSAGE (OUTPATIENT)
Dept: HEMATOLOGY/ONCOLOGY | Facility: CLINIC | Age: 63
End: 2018-10-18

## 2018-10-19 ENCOUNTER — TELEPHONE (OUTPATIENT)
Dept: HEMATOLOGY/ONCOLOGY | Facility: CLINIC | Age: 63
End: 2018-10-19

## 2018-10-19 ENCOUNTER — INFUSION (OUTPATIENT)
Dept: INFUSION THERAPY | Facility: HOSPITAL | Age: 63
End: 2018-10-19
Attending: EMERGENCY MEDICINE
Payer: COMMERCIAL

## 2018-10-26 ENCOUNTER — INFUSION (OUTPATIENT)
Dept: INFUSION THERAPY | Facility: HOSPITAL | Age: 63
End: 2018-10-26
Attending: INTERNAL MEDICINE
Payer: COMMERCIAL

## 2018-10-26 DIAGNOSIS — C85.90 T-CELL LYMPHOMA: Primary | ICD-10-CM

## 2018-10-26 PROCEDURE — 25000003 PHARM REV CODE 250: Performed by: INTERNAL MEDICINE

## 2018-10-26 PROCEDURE — A4216 STERILE WATER/SALINE, 10 ML: HCPCS | Performed by: INTERNAL MEDICINE

## 2018-10-26 PROCEDURE — 63600175 PHARM REV CODE 636 W HCPCS: Performed by: INTERNAL MEDICINE

## 2018-10-26 PROCEDURE — 96523 IRRIG DRUG DELIVERY DEVICE: CPT

## 2018-10-26 RX ORDER — SODIUM CHLORIDE 0.9 % (FLUSH) 0.9 %
10 SYRINGE (ML) INJECTION
Status: CANCELLED | OUTPATIENT
Start: 2018-10-26

## 2018-10-26 RX ORDER — HEPARIN 100 UNIT/ML
500 SYRINGE INTRAVENOUS
Status: COMPLETED | OUTPATIENT
Start: 2018-10-26 | End: 2018-10-26

## 2018-10-26 RX ORDER — HEPARIN 100 UNIT/ML
500 SYRINGE INTRAVENOUS
Status: CANCELLED | OUTPATIENT
Start: 2018-10-26

## 2018-10-26 RX ORDER — SODIUM CHLORIDE 0.9 % (FLUSH) 0.9 %
10 SYRINGE (ML) INJECTION
Status: COMPLETED | OUTPATIENT
Start: 2018-10-26 | End: 2018-10-26

## 2018-10-26 RX ADMIN — HEPARIN 500 UNITS: 100 SYRINGE at 11:10

## 2018-10-26 RX ADMIN — Medication 10 ML: at 11:10

## 2018-10-30 ENCOUNTER — HOSPITAL ENCOUNTER (OUTPATIENT)
Facility: HOSPITAL | Age: 63
Discharge: HOME | End: 2018-11-01
Attending: EMERGENCY MEDICINE | Admitting: INTERNAL MEDICINE
Payer: COMMERCIAL

## 2018-10-30 DIAGNOSIS — C85.90 T-CELL LYMPHOMA: ICD-10-CM

## 2018-10-30 DIAGNOSIS — R06.00 DYSPNEA: ICD-10-CM

## 2018-10-30 DIAGNOSIS — R22.0 LIP SWELLING: ICD-10-CM

## 2018-10-30 DIAGNOSIS — R22.0 LEFT FACIAL SWELLING: Primary | ICD-10-CM

## 2018-10-30 DIAGNOSIS — C85.90 LYMPHOMA, UNSPECIFIED BODY REGION, UNSPECIFIED LYMPHOMA TYPE: ICD-10-CM

## 2018-10-30 LAB
ALBUMIN SERPL BCP-MCNC: 3.6 G/DL
ALP SERPL-CCNC: 77 U/L
ALT SERPL W/O P-5'-P-CCNC: 11 U/L
ANION GAP SERPL CALC-SCNC: 6 MMOL/L
AST SERPL-CCNC: 30 U/L
BASOPHILS # BLD AUTO: 0.07 K/UL
BASOPHILS NFR BLD: 1.7 %
BILIRUB SERPL-MCNC: 0.4 MG/DL
BUN SERPL-MCNC: 10 MG/DL
CALCIUM SERPL-MCNC: 9.5 MG/DL
CHLORIDE SERPL-SCNC: 108 MMOL/L
CO2 SERPL-SCNC: 25 MMOL/L
CREAT SERPL-MCNC: 0.8 MG/DL
DIFFERENTIAL METHOD: ABNORMAL
EOSINOPHIL # BLD AUTO: 0.2 K/UL
EOSINOPHIL NFR BLD: 5.5 %
ERYTHROCYTE [DISTWIDTH] IN BLOOD BY AUTOMATED COUNT: 15.4 %
EST. GFR  (AFRICAN AMERICAN): >60 ML/MIN/1.73 M^2
EST. GFR  (NON AFRICAN AMERICAN): >60 ML/MIN/1.73 M^2
GLUCOSE SERPL-MCNC: 146 MG/DL
HCT VFR BLD AUTO: 36.6 %
HGB BLD-MCNC: 11.6 G/DL
IMM GRANULOCYTES # BLD AUTO: 0 K/UL
IMM GRANULOCYTES NFR BLD AUTO: 0 %
LACTATE SERPL-SCNC: 2.1 MMOL/L
LYMPHOCYTES # BLD AUTO: 1 K/UL
LYMPHOCYTES NFR BLD: 24.6 %
MCH RBC QN AUTO: 30.8 PG
MCHC RBC AUTO-ENTMCNC: 31.7 G/DL
MCV RBC AUTO: 97 FL
MONOCYTES # BLD AUTO: 0.5 K/UL
MONOCYTES NFR BLD: 12.6 %
NEUTROPHILS # BLD AUTO: 2.3 K/UL
NEUTROPHILS NFR BLD: 55.6 %
NRBC BLD-RTO: 0 /100 WBC
PLATELET # BLD AUTO: 235 K/UL
PMV BLD AUTO: 9.7 FL
POTASSIUM SERPL-SCNC: 4.2 MMOL/L
PROT SERPL-MCNC: 7.1 G/DL
RBC # BLD AUTO: 3.77 M/UL
SODIUM SERPL-SCNC: 139 MMOL/L
WBC # BLD AUTO: 4.19 K/UL

## 2018-10-30 PROCEDURE — 63600175 PHARM REV CODE 636 W HCPCS: Performed by: EMERGENCY MEDICINE

## 2018-10-30 PROCEDURE — 96375 TX/PRO/DX INJ NEW DRUG ADDON: CPT

## 2018-10-30 PROCEDURE — 87040 BLOOD CULTURE FOR BACTERIA: CPT | Mod: 59

## 2018-10-30 PROCEDURE — 85025 COMPLETE CBC W/AUTO DIFF WBC: CPT

## 2018-10-30 PROCEDURE — 25500020 PHARM REV CODE 255: Performed by: EMERGENCY MEDICINE

## 2018-10-30 PROCEDURE — 96376 TX/PRO/DX INJ SAME DRUG ADON: CPT

## 2018-10-30 PROCEDURE — 96366 THER/PROPH/DIAG IV INF ADDON: CPT

## 2018-10-30 PROCEDURE — 80053 COMPREHEN METABOLIC PANEL: CPT

## 2018-10-30 PROCEDURE — 99285 EMERGENCY DEPT VISIT HI MDM: CPT | Mod: 25

## 2018-10-30 PROCEDURE — 96367 TX/PROPH/DG ADDL SEQ IV INF: CPT

## 2018-10-30 PROCEDURE — 25000003 PHARM REV CODE 250: Performed by: EMERGENCY MEDICINE

## 2018-10-30 PROCEDURE — 96361 HYDRATE IV INFUSION ADD-ON: CPT

## 2018-10-30 PROCEDURE — 99291 CRITICAL CARE FIRST HOUR: CPT | Mod: ,,, | Performed by: EMERGENCY MEDICINE

## 2018-10-30 PROCEDURE — 93005 ELECTROCARDIOGRAM TRACING: CPT

## 2018-10-30 PROCEDURE — 82962 GLUCOSE BLOOD TEST: CPT

## 2018-10-30 PROCEDURE — 83605 ASSAY OF LACTIC ACID: CPT

## 2018-10-30 PROCEDURE — 96365 THER/PROPH/DIAG IV INF INIT: CPT

## 2018-10-30 RX ORDER — VANCOMYCIN 2 GRAM/500 ML IN 0.9 % SODIUM CHLORIDE INTRAVENOUS
2000
Status: COMPLETED | OUTPATIENT
Start: 2018-10-30 | End: 2018-10-30

## 2018-10-30 RX ORDER — AMOXICILLIN AND CLAVULANATE POTASSIUM 875; 125 MG/1; MG/1
1 TABLET, FILM COATED ORAL 2 TIMES DAILY
Qty: 14 TABLET | Refills: 0 | Status: SHIPPED | OUTPATIENT
Start: 2018-10-30 | End: 2018-11-01 | Stop reason: HOSPADM

## 2018-10-30 RX ADMIN — VANCOMYCIN HYDROCHLORIDE 2000 MG: 10 INJECTION, POWDER, LYOPHILIZED, FOR SOLUTION INTRAVENOUS at 09:10

## 2018-10-30 RX ADMIN — IOHEXOL 75 ML: 350 INJECTION, SOLUTION INTRAVENOUS at 10:10

## 2018-10-31 PROBLEM — R22.0 LEFT FACIAL SWELLING: Status: ACTIVE | Noted: 2018-10-31

## 2018-10-31 PROBLEM — K59.00 CONSTIPATION: Status: ACTIVE | Noted: 2018-10-31

## 2018-10-31 PROBLEM — E11.9 TYPE 2 DIABETES MELLITUS WITHOUT COMPLICATION, WITH LONG-TERM CURRENT USE OF INSULIN: Status: ACTIVE | Noted: 2018-10-31

## 2018-10-31 PROBLEM — Z79.4 TYPE 2 DIABETES MELLITUS WITHOUT COMPLICATION, WITH LONG-TERM CURRENT USE OF INSULIN: Status: ACTIVE | Noted: 2018-10-31

## 2018-10-31 LAB
ALBUMIN SERPL BCP-MCNC: 3.6 G/DL
ALP SERPL-CCNC: 75 U/L
ALT SERPL W/O P-5'-P-CCNC: 10 U/L
ANION GAP SERPL CALC-SCNC: 7 MMOL/L
AST SERPL-CCNC: 19 U/L
BASOPHILS # BLD AUTO: 0.04 K/UL
BASOPHILS NFR BLD: 0.5 %
BILIRUB SERPL-MCNC: 0.5 MG/DL
BUN SERPL-MCNC: 11 MG/DL (ref 6–30)
BUN SERPL-MCNC: 9 MG/DL
CALCIUM SERPL-MCNC: 9.2 MG/DL
CHLORIDE SERPL-SCNC: 102 MMOL/L (ref 95–110)
CHLORIDE SERPL-SCNC: 107 MMOL/L
CO2 SERPL-SCNC: 22 MMOL/L
CREAT SERPL-MCNC: 0.7 MG/DL
CREAT SERPL-MCNC: 0.7 MG/DL (ref 0.5–1.4)
DIFFERENTIAL METHOD: ABNORMAL
EOSINOPHIL # BLD AUTO: 0 K/UL
EOSINOPHIL NFR BLD: 0.3 %
ERYTHROCYTE [DISTWIDTH] IN BLOOD BY AUTOMATED COUNT: 15.8 %
EST. GFR  (AFRICAN AMERICAN): >60 ML/MIN/1.73 M^2
EST. GFR  (NON AFRICAN AMERICAN): >60 ML/MIN/1.73 M^2
ESTIMATED AVG GLUCOSE: 97 MG/DL
GLUCOSE SERPL-MCNC: 148 MG/DL (ref 70–110)
GLUCOSE SERPL-MCNC: 172 MG/DL
HBA1C MFR BLD HPLC: 5 %
HCT VFR BLD AUTO: 33.6 %
HCT VFR BLD CALC: 35 %PCV (ref 36–54)
HGB BLD-MCNC: 10.6 G/DL
IMM GRANULOCYTES # BLD AUTO: 0.02 K/UL
IMM GRANULOCYTES NFR BLD AUTO: 0.3 %
LYMPHOCYTES # BLD AUTO: 0.5 K/UL
LYMPHOCYTES NFR BLD: 6 %
MAGNESIUM SERPL-MCNC: 2 MG/DL
MCH RBC QN AUTO: 30.5 PG
MCHC RBC AUTO-ENTMCNC: 31.5 G/DL
MCV RBC AUTO: 97 FL
MONOCYTES # BLD AUTO: 0.2 K/UL
MONOCYTES NFR BLD: 1.9 %
NEUTROPHILS # BLD AUTO: 7.2 K/UL
NEUTROPHILS NFR BLD: 91 %
NRBC BLD-RTO: 0 /100 WBC
PHOSPHATE SERPL-MCNC: 3.3 MG/DL
PLATELET # BLD AUTO: 196 K/UL
PMV BLD AUTO: 10.2 FL
POC IONIZED CALCIUM: 1.11 MMOL/L (ref 1.06–1.42)
POC TCO2 (MEASURED): 27 MMOL/L (ref 23–29)
POCT GLUCOSE: 128 MG/DL (ref 70–110)
POCT GLUCOSE: 153 MG/DL (ref 70–110)
POCT GLUCOSE: 176 MG/DL (ref 70–110)
POCT GLUCOSE: 94 MG/DL (ref 70–110)
POTASSIUM BLD-SCNC: 4.1 MMOL/L (ref 3.5–5.1)
POTASSIUM SERPL-SCNC: 4.5 MMOL/L
PROT SERPL-MCNC: 6.6 G/DL
RBC # BLD AUTO: 3.47 M/UL
SAMPLE: ABNORMAL
SODIUM BLD-SCNC: 141 MMOL/L (ref 136–145)
SODIUM SERPL-SCNC: 136 MMOL/L
WBC # BLD AUTO: 7.86 K/UL

## 2018-10-31 PROCEDURE — 83735 ASSAY OF MAGNESIUM: CPT

## 2018-10-31 PROCEDURE — 25000003 PHARM REV CODE 250: Performed by: INTERNAL MEDICINE

## 2018-10-31 PROCEDURE — 99220 PR INITIAL OBSERVATION CARE,LEVL III: CPT | Mod: ,,, | Performed by: INTERNAL MEDICINE

## 2018-10-31 PROCEDURE — G0378 HOSPITAL OBSERVATION PER HR: HCPCS

## 2018-10-31 PROCEDURE — 83036 HEMOGLOBIN GLYCOSYLATED A1C: CPT

## 2018-10-31 PROCEDURE — S0028 INJECTION, FAMOTIDINE, 20 MG: HCPCS | Performed by: INTERNAL MEDICINE

## 2018-10-31 PROCEDURE — 85025 COMPLETE CBC W/AUTO DIFF WBC: CPT

## 2018-10-31 PROCEDURE — 63600175 PHARM REV CODE 636 W HCPCS: Performed by: STUDENT IN AN ORGANIZED HEALTH CARE EDUCATION/TRAINING PROGRAM

## 2018-10-31 PROCEDURE — 63600175 PHARM REV CODE 636 W HCPCS: Performed by: EMERGENCY MEDICINE

## 2018-10-31 PROCEDURE — 80053 COMPREHEN METABOLIC PANEL: CPT

## 2018-10-31 PROCEDURE — 84100 ASSAY OF PHOSPHORUS: CPT

## 2018-10-31 PROCEDURE — 63600175 PHARM REV CODE 636 W HCPCS: Performed by: INTERNAL MEDICINE

## 2018-10-31 PROCEDURE — 25000003 PHARM REV CODE 250: Performed by: STUDENT IN AN ORGANIZED HEALTH CARE EDUCATION/TRAINING PROGRAM

## 2018-10-31 PROCEDURE — 25000003 PHARM REV CODE 250: Performed by: EMERGENCY MEDICINE

## 2018-10-31 RX ORDER — LANOLIN ALCOHOL/MO/W.PET/CERES
800 CREAM (GRAM) TOPICAL EVERY 4 HOURS PRN
Status: DISCONTINUED | OUTPATIENT
Start: 2018-10-31 | End: 2018-11-01 | Stop reason: HOSPADM

## 2018-10-31 RX ORDER — SODIUM,POTASSIUM PHOSPHATES 280-250MG
2 POWDER IN PACKET (EA) ORAL EVERY 4 HOURS PRN
Status: DISCONTINUED | OUTPATIENT
Start: 2018-10-31 | End: 2018-11-01 | Stop reason: HOSPADM

## 2018-10-31 RX ORDER — ONDANSETRON 2 MG/ML
8 INJECTION INTRAMUSCULAR; INTRAVENOUS
Status: COMPLETED | OUTPATIENT
Start: 2018-10-31 | End: 2018-10-31

## 2018-10-31 RX ORDER — DEXAMETHASONE SODIUM PHOSPHATE 4 MG/ML
4 INJECTION, SOLUTION INTRA-ARTICULAR; INTRALESIONAL; INTRAMUSCULAR; INTRAVENOUS; SOFT TISSUE EVERY 6 HOURS
Status: DISCONTINUED | OUTPATIENT
Start: 2018-10-31 | End: 2018-11-01

## 2018-10-31 RX ORDER — SODIUM CHLORIDE 0.9 % (FLUSH) 0.9 %
5 SYRINGE (ML) INJECTION
Status: DISCONTINUED | OUTPATIENT
Start: 2018-10-31 | End: 2018-11-01 | Stop reason: HOSPADM

## 2018-10-31 RX ORDER — ACETAMINOPHEN 325 MG/1
650 TABLET ORAL EVERY 6 HOURS PRN
Status: DISCONTINUED | OUTPATIENT
Start: 2018-10-31 | End: 2018-11-01 | Stop reason: HOSPADM

## 2018-10-31 RX ORDER — LANOLIN ALCOHOL/MO/W.PET/CERES
400 CREAM (GRAM) TOPICAL EVERY 4 HOURS PRN
Status: DISCONTINUED | OUTPATIENT
Start: 2018-10-31 | End: 2018-11-01 | Stop reason: HOSPADM

## 2018-10-31 RX ORDER — INSULIN ASPART 100 [IU]/ML
0-5 INJECTION, SOLUTION INTRAVENOUS; SUBCUTANEOUS EVERY 6 HOURS PRN
Status: DISCONTINUED | OUTPATIENT
Start: 2018-10-31 | End: 2018-11-01 | Stop reason: HOSPADM

## 2018-10-31 RX ORDER — FAMOTIDINE 10 MG/ML
20 INJECTION INTRAVENOUS 2 TIMES DAILY
Status: DISCONTINUED | OUTPATIENT
Start: 2018-10-31 | End: 2018-11-01 | Stop reason: HOSPADM

## 2018-10-31 RX ORDER — ONDANSETRON 2 MG/ML
8 INJECTION INTRAMUSCULAR; INTRAVENOUS EVERY 6 HOURS PRN
Status: DISCONTINUED | OUTPATIENT
Start: 2018-10-31 | End: 2018-11-01 | Stop reason: HOSPADM

## 2018-10-31 RX ORDER — PROCHLORPERAZINE EDISYLATE 5 MG/ML
5 INJECTION INTRAMUSCULAR; INTRAVENOUS EVERY 6 HOURS PRN
Status: DISCONTINUED | OUTPATIENT
Start: 2018-10-31 | End: 2018-11-01 | Stop reason: HOSPADM

## 2018-10-31 RX ORDER — AMOXICILLIN 250 MG
1 CAPSULE ORAL 2 TIMES DAILY PRN
Status: DISCONTINUED | OUTPATIENT
Start: 2018-10-31 | End: 2018-11-01 | Stop reason: HOSPADM

## 2018-10-31 RX ORDER — ONDANSETRON 2 MG/ML
4 INJECTION INTRAMUSCULAR; INTRAVENOUS EVERY 8 HOURS
Status: DISCONTINUED | OUTPATIENT
Start: 2018-10-31 | End: 2018-10-31

## 2018-10-31 RX ORDER — POTASSIUM CHLORIDE 20 MEQ/1
20 TABLET, EXTENDED RELEASE ORAL
Status: DISCONTINUED | OUTPATIENT
Start: 2018-10-31 | End: 2018-11-01 | Stop reason: HOSPADM

## 2018-10-31 RX ORDER — IBUPROFEN 200 MG
16 TABLET ORAL
Status: DISCONTINUED | OUTPATIENT
Start: 2018-10-31 | End: 2018-11-01 | Stop reason: HOSPADM

## 2018-10-31 RX ORDER — SODIUM,POTASSIUM PHOSPHATES 280-250MG
1 POWDER IN PACKET (EA) ORAL EVERY 4 HOURS PRN
Status: DISCONTINUED | OUTPATIENT
Start: 2018-10-31 | End: 2018-11-01 | Stop reason: HOSPADM

## 2018-10-31 RX ORDER — DIPHENHYDRAMINE HYDROCHLORIDE 50 MG/ML
25 INJECTION INTRAMUSCULAR; INTRAVENOUS EVERY 8 HOURS
Status: COMPLETED | OUTPATIENT
Start: 2018-10-31 | End: 2018-11-01

## 2018-10-31 RX ORDER — SODIUM CHLORIDE 9 MG/ML
INJECTION, SOLUTION INTRAVENOUS CONTINUOUS
Status: ACTIVE | OUTPATIENT
Start: 2018-10-31 | End: 2018-10-31

## 2018-10-31 RX ORDER — DIPHENHYDRAMINE HYDROCHLORIDE 50 MG/ML
25 INJECTION INTRAMUSCULAR; INTRAVENOUS
Status: COMPLETED | OUTPATIENT
Start: 2018-10-31 | End: 2018-10-31

## 2018-10-31 RX ORDER — DEXAMETHASONE SODIUM PHOSPHATE 4 MG/ML
8 INJECTION, SOLUTION INTRA-ARTICULAR; INTRALESIONAL; INTRAMUSCULAR; INTRAVENOUS; SOFT TISSUE
Status: COMPLETED | OUTPATIENT
Start: 2018-10-31 | End: 2018-10-31

## 2018-10-31 RX ORDER — DEXAMETHASONE SODIUM PHOSPHATE 4 MG/ML
8 INJECTION, SOLUTION INTRA-ARTICULAR; INTRALESIONAL; INTRAMUSCULAR; INTRAVENOUS; SOFT TISSUE ONCE
Status: COMPLETED | OUTPATIENT
Start: 2018-10-31 | End: 2018-10-31

## 2018-10-31 RX ORDER — IBUPROFEN 200 MG
24 TABLET ORAL
Status: DISCONTINUED | OUTPATIENT
Start: 2018-10-31 | End: 2018-11-01 | Stop reason: HOSPADM

## 2018-10-31 RX ORDER — METHYLPREDNISOLONE SOD SUCC 125 MG
125 VIAL (EA) INJECTION
Status: DISCONTINUED | OUTPATIENT
Start: 2018-10-31 | End: 2018-10-31

## 2018-10-31 RX ORDER — SODIUM CHLORIDE 9 MG/ML
INJECTION, SOLUTION INTRAVENOUS CONTINUOUS
Status: ACTIVE | OUTPATIENT
Start: 2018-10-31 | End: 2018-11-01

## 2018-10-31 RX ORDER — ENOXAPARIN SODIUM 100 MG/ML
40 INJECTION SUBCUTANEOUS EVERY 24 HOURS
Status: DISCONTINUED | OUTPATIENT
Start: 2018-10-31 | End: 2018-11-01 | Stop reason: HOSPADM

## 2018-10-31 RX ORDER — ACETAMINOPHEN 325 MG/1
650 TABLET ORAL
Status: COMPLETED | OUTPATIENT
Start: 2018-10-31 | End: 2018-10-31

## 2018-10-31 RX ORDER — GLUCAGON 1 MG
1 KIT INJECTION
Status: DISCONTINUED | OUTPATIENT
Start: 2018-10-31 | End: 2018-11-01 | Stop reason: HOSPADM

## 2018-10-31 RX ADMIN — FAMOTIDINE 20 MG: 10 INJECTION, SOLUTION INTRAVENOUS at 08:10

## 2018-10-31 RX ADMIN — ENOXAPARIN SODIUM 40 MG: 100 INJECTION SUBCUTANEOUS at 06:10

## 2018-10-31 RX ADMIN — ONDANSETRON 8 MG: 2 INJECTION INTRAMUSCULAR; INTRAVENOUS at 12:10

## 2018-10-31 RX ADMIN — PROMETHAZINE HYDROCHLORIDE 12.5 MG: 25 INJECTION INTRAMUSCULAR; INTRAVENOUS at 12:10

## 2018-10-31 RX ADMIN — SODIUM CHLORIDE: 0.9 INJECTION, SOLUTION INTRAVENOUS at 01:10

## 2018-10-31 RX ADMIN — DIPHENHYDRAMINE HYDROCHLORIDE 25 MG: 50 INJECTION, SOLUTION INTRAMUSCULAR; INTRAVENOUS at 01:10

## 2018-10-31 RX ADMIN — SODIUM CHLORIDE: 0.9 INJECTION, SOLUTION INTRAVENOUS at 03:10

## 2018-10-31 RX ADMIN — DEXAMETHASONE SODIUM PHOSPHATE 4 MG: 4 INJECTION, SOLUTION INTRAMUSCULAR; INTRAVENOUS at 06:10

## 2018-10-31 RX ADMIN — DEXAMETHASONE SODIUM PHOSPHATE 8 MG: 4 INJECTION, SOLUTION INTRAMUSCULAR; INTRAVENOUS at 12:10

## 2018-10-31 RX ADMIN — FAMOTIDINE 20 MG: 10 INJECTION, SOLUTION INTRAVENOUS at 12:10

## 2018-10-31 RX ADMIN — DIPHENHYDRAMINE HYDROCHLORIDE 25 MG: 50 INJECTION, SOLUTION INTRAMUSCULAR; INTRAVENOUS at 12:10

## 2018-10-31 RX ADMIN — DEXAMETHASONE SODIUM PHOSPHATE 4 MG: 4 INJECTION, SOLUTION INTRAMUSCULAR; INTRAVENOUS at 11:10

## 2018-10-31 RX ADMIN — ACETAMINOPHEN 650 MG: 325 TABLET ORAL at 01:10

## 2018-10-31 RX ADMIN — DIPHENHYDRAMINE HYDROCHLORIDE 25 MG: 50 INJECTION, SOLUTION INTRAMUSCULAR; INTRAVENOUS at 08:10

## 2018-10-31 NOTE — ED NOTES
Wife is present.    Pain:  denies    Psychosocial:  Patient is calm and cooperative.  Patients insight and judgement are appropriate to situation.  Appears clean, well maintained, with clothing appropriate to environment.  No evidence of hallucinations, delusions, or psychosis.    Neuro:  Eyes open spontaneously.  Awake, alert.  Oriented x 4.  Speech clear and appropriate.  Tolerating saliva secretions well.  Able to follow commands, demonstrating ability to actively and appropriately communicate within context of current conversation.      Airway:  Bilateral chest rise and fall.  RR regular and non labored.  Air entry patent and clear x 5 lobes of the lungs.     Circulatory:  Skin warm, dry, and pink.  Apical and radial pulses strong and regular.      Abdomen:  Abdomen soft and non distended.      Urinary:  Patient reports routine urination without pain, frequency, or urgency.  Voids independently.    Extremities:  No redness, heat, swelling, deformity, or pain.    Skin:  Intact with no bruising/discolorations noted. Pt. Denies skin breakdown.

## 2018-10-31 NOTE — ED NOTES
Patient under constant observation since start of symptoms with frequent assessments and reassessments  At this time patient verbalizes improvement in symptoms and verbalizes that the nausea has improved. Patient also denies headache. Patient is no longer diaphoretic and vision is back to baseline per patient  Will continue to monitor patient for any changes in condition .

## 2018-10-31 NOTE — ED NOTES
Patient continues to rest in bed. Denies nausea, chest pain, or shortness of breath  Patient also denies headaches and vision changes

## 2018-10-31 NOTE — ED PROVIDER NOTES
Encounter Date: 10/30/2018    SCRIBE #1 NOTE: I, Rochelle Jeffesron, am scribing for, and in the presence of,  Dr. Tolentino. I have scribed the entire note.       History     Chief Complaint   Patient presents with    Facial Swelling     being treated at [facility] for lymphoma, swelling to L side of face and lip, voice clear, resp even     Time patient was seen by the provider: 8:09 PM      The patient is a 63 y.o. male with co-morbidities including: pre-diabetes, peripheral neuropathy, and lymphoma, who presents to the ED with a complaint of left sided facial swelling. He states he noticed his lip swelling 2 days ago and it has mildly progressed toward the middle of his lip. He complains of headache. He notes erythema to his left lower cheek. He reports it is difficult to open his jaw. He denies tongue swelling, difficulty swallowing, chest pain, shortness of breath. He denies diet changes or changes in soap or detergents at home. He has a left upper extremity PICC line. His oncologist is Dr. Parmer at [facility], [phone].       The history is provided by the patient.     Review of patient's allergies indicates:  No Known Allergies  Past Medical History:   Diagnosis Date    Cancer     Hearing loss     Peripheral neuropathy 6/19/2018    Pre-diabetes      Past Surgical History:   Procedure Laterality Date    DISSECTION OF NECK Left 5/30/2018    Procedure: DISSECTION, NECK;  Surgeon: Regan King MD;  Location: [facility] OR [address] [place] [address];  Service: ENT;  Laterality: Left;    DISSECTION, NECK Left 5/30/2018    Performed by Regan King MD at [facility] OR [address] [place] [address]    EXCISION OF PAROTID GLAND Left 5/30/2018    Procedure: EXCISION, PAROTID GLAND;  Surgeon: Regan King MD;  Location: [facility] OR [address] [place] [address];  Service: ENT;  Laterality: Left;    EXCISION, PAROTID GLAND Left 5/30/2018    Performed by Regan King MD at [facility] OR [address] [place] [address]    KNEE SURGERY Right      History reviewed. No pertinent family  history.  Social History     Tobacco Use    Smoking status: Former Smoker     Types: Cigars    Smokeless tobacco: Never Used    Tobacco comment: 1 cigar a week   Substance Use Topics    Alcohol use: Yes     Alcohol/week: 0.6 oz     Types: 1 Shots of liquor per week     Comment: 2 per month    Drug use: No     Review of Systems   Constitutional: Negative for fever.   HENT: Positive for facial swelling (left sided). Negative for trouble swallowing.    Eyes: Negative for visual disturbance.   Respiratory: Negative for shortness of breath.    Cardiovascular: Negative for chest pain.   Gastrointestinal: Negative for abdominal pain.   Genitourinary: Negative for dysuria.   Musculoskeletal: Negative for back pain.   Skin: Positive for color change (erythema on left lower cheek).   Neurological: Positive for headaches.       Physical Exam     Initial Vitals [10/30/18 1858]   BP Pulse Resp Temp SpO2   129/87 100 18 98.5 °F (36.9 °C) 97 %      MAP       --         Physical Exam    Nursing note and vitals reviewed.  Constitutional: He is not diaphoretic. No distress.   Ill appearing.   HENT:   Head: Normocephalic and atraumatic.   Mouth/Throat: Oropharynx is clear and moist.   Significant deformity to left side of face and neck with stasis changes of the skin. Fullness of submental and submandibular. Edema of left lower lip to midline. No tongue swelling. Uvula midline. No drooling.   Eyes: Conjunctivae and EOM are normal. Pupils are equal, round, and reactive to light.   Neck: Normal range of motion. Neck supple.   Cardiovascular: Normal rate, regular rhythm, normal heart sounds and intact distal pulses.   Pulmonary/Chest: Breath sounds normal. No stridor. No respiratory distress. He has no wheezes. He has no rhonchi. He has no rales.   Abdominal: Soft. He exhibits no distension. There is no tenderness.   Musculoskeletal: Normal range of motion. He exhibits no edema.   Neurological: He is alert and oriented to person,  place, and time. He has normal strength. No cranial nerve deficit or sensory deficit.   Skin: Skin is warm and dry.                     ED Course   Procedures  Labs Reviewed   CBC W/ AUTO DIFFERENTIAL - Abnormal; Notable for the following components:       Result Value    RBC 3.77 (*)     Hemoglobin 11.6 (*)     Hematocrit 36.6 (*)     MCHC 31.7 (*)     RDW 15.4 (*)     All other components within normal limits   COMPREHENSIVE METABOLIC PANEL - Abnormal; Notable for the following components:    Glucose 146 (*)     Anion Gap 6 (*)     All other components within normal limits   CULTURE, BLOOD   CULTURE, BLOOD   LACTIC ACID, PLASMA   POCT CREATININE   POCT GLUCOSE MONITORING CONTINUOUS          Imaging Results          CTA Neck (Final result)  Result time 10/30/18 23:24:59    Final result by Noel Valencia MD (10/30/18 23:24:59)                 Impression:      1.  CT angiogram with mild narrowing at the left carotid bulb without hemodynamically significant stenosis, potentially secondary to post radiation changes or encasement from large soft tissue mass.    2. Nonvisualization of the left jugular vein, either thrombosed or surgically absent in this patient with history of left-sided neck exploration.    3.  Large infiltrating soft tissue mass involving the left face and neck as above, previously hypermetabolic on PET/CT and compatible with malignancy.    4.  Multiple prominent right submental and right internal jugular chain lymph nodes, some of which were previously hypermetabolic on PET/CT and more conspicuous than prior PET/CT examination.    Electronically signed by resident: Joseph Taylor  Date:    10/30/2018  Time:    22:48    Electronically signed by: Noel Valencia MD  Date:    10/30/2018  Time:    23:24             Narrative:    EXAMINATION:  CTA NECK    CLINICAL HISTORY:  Neck mass, nonpulsatile, solitary    TECHNIQUE:  CT angiogram was performed from the level of the delia to the EAC following the IV  administration of 75mL of Omnipaque 350.   Sagittal and coronal reconstructions and maximum intensity projection reconstructions were performed. Arterial stenosis percentages are based on NASCET measurement criteria.    COMPARISON:  PET/CT 10/11/2018 and CT scan of the neck dated 05/18/2018.    FINDINGS:  There is a left-sided aortic arch with 3 branch vessels.  The aortic branch vessels are patent and normal in caliber.    The common carotid arteries are normal in caliber without significant stenosis.  There is no atherosclerotic disease at either carotid bifurcation.  The right internal carotid artery is normal in caliber and course.    There is mild, smooth narrowing at the left carotid bulb, potentially due to post radiation changes or to encasement from large soft tissue mass without hemodynamically significant stenosis.    The vertebral arteries are normal in caliber and course without evidence of stenosis.  The visualized intracranial circulation is unremarkable.    The right internal jugular vein is patent.  The left internal jugular vein is not visualized and may be thrombosed.    There is a large infiltrating mass involving the left soft tissues involving the left neck and left face extending from just below the zygomatic arch to the left sternum.  This mass was previously hypermetabolic on PET/CT and is compatible with malignancy.  There is diffuse abnormal soft tissue attenuation throughout the left carotid space compatible with involvement of tumor, there is diffuse skin thickening of the left face and neck.  This lesion is difficult to measure due to its large size infiltrating nature.  Abnormal soft tissue attenuation extends over 17 cm in cc dimension as measured on coronal images.  There are scattered surgical clips throughout the left neck mass compatible with right ankle left-sided parotidectomy along with left neck dissection..    There are prominent cervical lymph nodes within the right  submental region involving stations I a and IB, measuring up to 1.7 cm, some of which were hypermetabolic on prior PET/CT examination there are additional multiple prominent lymph nodes throughout the right internal jugular chain, which appear more conspicuous than prior PET-CT on 10/11/2018.    There is diffuse involvement of tumor within the left parotid and submandibular glands.  The right parotid gland and right submandibular gland are unremarkable.    The pharynx and larynx are unremarkable.  The visualized airway is patent.  The lung apices are well aerated and clear.  There is a peripherally inserted central venous catheter with tip terminating in the proximal SVC.  Visualized thoracic inlet is otherwise unremarkable.    The osseous structures demonstrate no evidence of displaced fracture or aggressive lesion.  There are mild age-appropriate degenerative changes of the cervical spine.                                 Medical Decision Making:   History:   Old Medical Records: I decided to obtain old medical records.  Initial Assessment:   This is an emergent evaluation a 63-year-old male with history of peripheral T-cell lymphoma presenting with worsening left-sided facial swelling.  Differential Diagnosis:   Cellulitis, abscess, lymph node enlargement, venous stasis changes, allergic reaction  Clinical Tests:   Lab Tests: Ordered and Reviewed  Radiological Study: Ordered and Reviewed  ED Management:  - labs  - CTA neck  - IV vanc    8:30 PM  Case discussed with Dr. Parmer, oncologist at [facility]. He had a recent biopsy that showed peripheral T cell lymphoma and imaging that was concerning for internal jugular vein thrombosis. She recommends a repeat CT of the neck, antibiotics, and possible anticoagulation. Will call her to discuss results for the final plan.     10:00 PM  Labs with no leukocytosis, nl lactate, nl CMP.  Blood cultures pending. Pt continues to protect airway- no change in facial swelling,  drooling for development of stridor.    11:15 PM  CTA reviewed which shows narrowing of the left carotid without significant stenosis, nonvisualization of the left jugular vein which could be surgically absent versus thrombosis, large infiltrating tissue mass involving the neck and face previously seen recent imaging.  These findings were discussed with Dr. Chang ([phone]) who recommends patient comes to ED at [facility] for admission.  I have discussed transport vs POV- pt prefers POV.  His vitals remained stable while in ED.  She recommends d.c on Augmentin.     12:00 PM   Upon dishcharge, pt noted to be vomiting.  Zofran 8 mg IV given and benadryl 25 mg IV.  Will hold discharge and monitor. ? If symptoms are secondary to contrast.  Decadron 8 mg IV given    I reported the bedside for re-evaluation, patient is diaphoretic and continued vomiting. Phenergan ordered.  Vital signs are stable. He reports headache with left-sided head pain this is been intermittent for the past 1 week per wife at bedside- head ct ordered  Will control pain    I updated Dr. Parmer with new events.  I will admit to [facility] and inpatient team can evaluate for transfer if necessary when clinically stable.  Case discussed with BMT fellow, pt will be placed in obs.  Other:   I have discussed this case with another health care provider.            Scribe Attestation:   Scribe #1: I performed the above scribed service and the documentation accurately describes the services I performed. I attest to the accuracy of the note.    Attending Attestation:         Attending Critical Care:   Critical Care Times:   ==============================================================  · Total Critical Care Time - exclusive of procedural time: 45 minutes.  ==============================================================  Critical care reasons: lymphoma.   Critical care was time spent personally by me on the following activities: discussion with consultants,  evaluation of patient's response to treatment, development of treatment plan with patient or relative, review of old charts, review of x-rays / CT sent with the patient and re-evaluation of patient's conition.     Physician Attestation for Scribe:      Comments: I, Dr. Ruby Tolentino, personally performed the services described in this documentation. All medical record entries made by the scribe were at my direction and in my presence.  I have reviewed the chart and agree that the record reflects my personal performance and is accurate and complete. Ruby Tolentino MD.                 Clinical Impression:   The primary encounter diagnosis was Left facial swelling. Diagnoses of Lip swelling and Lymphoma, unspecified body region, unspecified lymphoma type were also pertinent to this visit.      Disposition:   Disposition: Discharged  Condition: Stable                        Ruby Tolentino MD  10/31/18 0055

## 2018-10-31 NOTE — ASSESSMENT & PLAN NOTE
Nausea/Vomiting  - CTA Neck:   1. CT angiogram with mild narrowing at the left carotid bulb without hemodynamically significant stenosis, potentially secondary to post radiation changes or encasement from large soft tissue mass.  2. Nonvisualization of the left jugular vein, either thrombosed or surgically absent in this patient with history of left-sided neck exploration.  3.  Large infiltrating soft tissue mass involving the left face and neck as above, previously hypermetabolic on PET/CT and compatible with malignancy.  4.  Multiple prominent right submental and right internal jugular chain lymph nodes, some of which were previously hypermetabolic on PET/CT and more conspicuous than prior PET/CT examination.  - No leukocytosis, afebrile, VSS. S/p 2 g IV Vancomycin in ED. Low threshold to start abx to cover for possible cellulitis.    - prn anti-emetics and pain medications, IVFs

## 2018-10-31 NOTE — ED NOTES
Alerted to patients room. Patient vomiting/coughing and spiting up saliva, diaphoretic and complaining of headache and blurred vision   MD simultaneously arrived in room with writer   Medications ordered, given as ordered. IV fluids ordered and given as ordered. BG checked : 93  Patient on continuous cardiac, BP and oxygen monitoring.   Will stay at bedside of patient until symptoms improve.

## 2018-10-31 NOTE — ED TRIAGE NOTES
Pt came in c/o of increasing swelling of the neck/lip. Pt reported being diagnosed with tcell lymphoma in May of 2018 and went through 5 weeks of chemo already.  Pt reported being sent here by  to be evaluated by another doctor and to have doctor call his doctor for further explanation of history.. Pt denies cp, SOB, pain, chills, fever.

## 2018-10-31 NOTE — NURSING
Received report from Ray RN, pt in bed resting, respirations even and unlabored, no acute distress, no complaints of pain, vital signs stable, call light in reach, safety precautions in place, will continue to monitor

## 2018-10-31 NOTE — ASSESSMENT & PLAN NOTE
- 7/2018 hgb a1c 7.1  - home meds: Metformin 1 g BID, holding while inpatient  - LDSS + POCT glc  - repeat hgb A1c pending

## 2018-10-31 NOTE — PROVIDER PROGRESS NOTES - EMERGENCY DEPT.
Encounter Date: 10/30/2018    ED Physician Progress Notes       SCRIBE NOTE: I, Rochelle Jefferson, am scribing for, and in the presence of,  Dr. Tolentino.  Physician Statement: I, Dr. Tolentino, personally performed the services described in this documentation as scribed by Rochelle Jefferson in my presence, and it is both accurate and complete.     Physician Note:   Time patient was seen by the provider: 8:09 PM      The patient is a 63 y.o. male with co-morbidities including: pre-diabetes, peripheral neuropathy, and lymphoma, who presents to the ED with a complaint of left sided facial swelling. He states he noticed his lip swelling 2 days ago and it has mildly progressed toward the middle of his lip. He complains of headache. He notes erythema to his left lower cheek. He reports it is difficult to open his jaw. He denies tongue swelling, difficulty swallowing, chest pain, shortness of breath. He denies diet changes or changes in soap or detergents at home. He has a left upper extremity PICC line. His oncologist is Dr. Chang at [facility], [phone].     8:30 PM  Case discussed with Dr. Chang, oncologist at [facility]. He had a recent biopsy that showed peripheral T cell lymphoma and imaging that was concerning for internal jugular vein thrombosis. She recommends a repeat CT of the neck, antibiotics, and possible anticoagulation. Will call her to discuss results for the final plan.

## 2018-10-31 NOTE — ASSESSMENT & PLAN NOTE
- follows with Dr. Rao, last seen 10/15  Pt was being treated for peripheral T-cell lymphoma by .  At the time of diagnosis, it appeared that all of the disease was confined to his neck and most of it was surgically removed.  A PET scan in June 2018 was normal except for a lymph node just to the  left of midline in the upper neck with an SUV max of 18.2.  That node was palpable, but it disappeared after the first course of therapy.  His pretreatment LDH was normal at 175.     He has completed five courses of dose adjusted EPOCH.  The doses of cyclophosphamide and etoposide have been increased twice based on neutrophil counts and platelet counts.  Adriamycin not incrased because higher than normal risk of future cardiac disease.  Also, the dosage of vincristine was decreased slightly because of diabetes mellitus and numbness in his feet.  The numbness in the feet has not increased. PET CT done on 10/11/18. Dr. Rao had discussed salvage treatment with Gemcitabine-Oxalipaltin-Decadron. He is planning on going to [facility] for chemo.  - per chart review on: Acyclovir, protonix 40 daily, but pt states he does not take anything but Metformin

## 2018-10-31 NOTE — NURSING
Pt in bed resting, wife is at the bedside, pt doesn't need anything at the moment, respirations even and unlabored, no acute distress, no complaints of pain, safety precautions in place call light in reach, will continue to monitor.

## 2018-10-31 NOTE — HPI
Mr. Arjun Mcpherson, 63 y.o. male with PMH of DM2, peripheral neuropathy, and T-cell lymphoma, presents to the ED with a complaint of left sided facial swelling. He states he noticed his lip swelling 2 days ago and it has mildly progressed toward the middle of his lip. He complains of headache. He notes erythema to his left lower cheek. He reports it is difficult to open his jaw. He denies tongue swelling, difficulty swallowing, chest pain, shortness of breath. He denies diet changes or changes in soap or detergents at home. He has a left upper extremity PICC line. His oncologist is Dr. Parmer at [facility], [phone].     Case was discussed with Dr. Parmer, oncologist at [facility]. He had a recent biopsy that showed peripheral T cell lymphoma and imaging that was concerning for internal jugular vein thrombosis. Recommended CT of the neck and abx. In the ED, labs with no leukocytosis, nl lactate, nl CMP.  Blood cultures pending. Pt protecting airway- no change in facial swelling, drooling for development of stridor.    CTA reviewed which shows narrowing of the left carotid without significant stenosis, nonvisualization of the left jugular vein which could be surgically absent versus thrombosis, large infiltrating tissue mass involving the neck and face previously seen recent imaging.  These findings were discussed with Dr. Chang ([phone]) who recommended patient come to ED at [facility] for admission. She recommended d.c on Augmentin. However, upon dishcharge, pt noted to be vomiting.  Zofran 8 mg, benadryl 25 mg IV, and Decadron 8 mg IV, and Phenergan given. Dr. Chang updated with new events and pt admitted to obs for supportive care. Can evaluate for transfer if necessary when clinically stable.

## 2018-10-31 NOTE — H&P
[facility]  Hematology  Bone Marrow Transplant  H&P    Subjective:     Principal Problem: Left facial swelling    HPI: Mr. Arjun Mcpherson, 63 y.o. male with PMH of DM2, peripheral neuropathy, and T-cell lymphoma, presents to the ED with a complaint of left sided facial swelling. He states he noticed his lip swelling 2 days ago and it has mildly progressed toward the middle of his lip. He complains of headache. He notes erythema to his left lower cheek. He reports it is difficult to open his jaw. He denies tongue swelling, difficulty swallowing, chest pain, shortness of breath. He denies diet changes or changes in soap or detergents at home. He has a left upper extremity PICC line. His oncologist is Dr. Parmer at [facility], [phone].     Case was discussed with Dr. Parmer, oncologist at [facility]. He had a recent biopsy that showed peripheral T cell lymphoma and imaging that was concerning for internal jugular vein thrombosis. Recommended CT of the neck and abx. In the ED, labs with no leukocytosis, nl lactate, nl CMP.  Blood cultures pending. Pt protecting airway- no change in facial swelling, drooling for development of stridor.    CTA reviewed which shows narrowing of the left carotid without significant stenosis, nonvisualization of the left jugular vein which could be surgically absent versus thrombosis, large infiltrating tissue mass involving the neck and face previously seen recent imaging.  These findings were discussed with Dr. Chang ([phone]) who recommended patient come to ED at [facility] for admission. She recommended d.c on Augmentin. However, upon dishcharge, pt noted to be vomiting.  Zofran 8 mg, benadryl 25 mg IV, and Decadron 8 mg IV, and Phenergan given. Dr. Chang updated with new events and pt admitted to obs for supportive care. Can evaluate for transfer if necessary when clinically stable.             Patient information was obtained from patient, past  medical records and ER records.     Oncology History:    Per last clinic note: 63-year-old man who was being treated for peripheral T-cell lymphoma by .  At the time of diagnosis, it appeared that all of the disease was confined to his neck and most of it was surgically removed.  A PET scan in June 2018 was normal except for a lymph node just to the  left of midline in the upper neck with an SUV max of 18.2.  That node was palpable, but it disappeared after the first course of therapy.  His pretreatment LDH was normal at 175.     He has completed five courses of dose adjusted EPOCH.  The doses of cyclophosphamide and etoposide have been increased twice based on neutrophil counts and platelet counts.  Adriamycin not incrased because higher than normal risk of future cardiac disease.  Also, the dosage of vincristine was decreased slightly because of diabetes mellitus and numbness in his feet.  The numbness in the feet has not increased. PET CT done on 10/11/18. Dr. Rao had discussed salvage treatment with Gemcitabine-Oxalipaltin-Decadron. He is planning on going to [facility] for chemo.      (Not in a hospital admission)    Patient has no known allergies.     Past Medical History:   Diagnosis Date    Cancer     Hearing loss     Peripheral neuropathy 6/19/2018    Pre-diabetes      Past Surgical History:   Procedure Laterality Date    DISSECTION OF NECK Left 5/30/2018    Procedure: DISSECTION, NECK;  Surgeon: Regan King MD;  Location: [facility] OR [address] [place] [address];  Service: ENT;  Laterality: Left;    DISSECTION, NECK Left 5/30/2018    Performed by Regan King MD at [facility] OR [address] [place] [address]    EXCISION OF PAROTID GLAND Left 5/30/2018    Procedure: EXCISION, PAROTID GLAND;  Surgeon: Regan King MD;  Location: [facility] OR [address] [place] [address];  Service: ENT;  Laterality: Left;    EXCISION, PAROTID GLAND Left 5/30/2018    Performed by Regan King MD at [facility] OR [address] [place] [address]    KNEE SURGERY Right      Family  History     None        Tobacco Use    Smoking status: Former Smoker     Types: Cigars    Smokeless tobacco: Never Used    Tobacco comment: 1 cigar a week   Substance and Sexual Activity    Alcohol use: Yes     Alcohol/week: 0.6 oz     Types: 1 Shots of liquor per week     Comment: 2 per month    Drug use: No    Sexual activity: Not on file       Review of Systems   Constitutional: Negative for fatigue and fever.   HENT: Positive for facial swelling (L side). Negative for congestion and rhinorrhea.    Eyes: Negative for pain and visual disturbance.   Respiratory: Negative for cough and shortness of breath.    Cardiovascular: Negative for chest pain and palpitations.   Gastrointestinal: Positive for nausea (improved s/p meds) and vomiting (improved). Negative for abdominal pain.   Genitourinary: Negative for difficulty urinating and dysuria.   Musculoskeletal: Negative for arthralgias and back pain.   Skin: Positive for color change (erythema). Negative for pallor.   Neurological: Positive for headaches (improved). Negative for dizziness.   Hematological: Negative for adenopathy. Does not bruise/bleed easily.   Psychiatric/Behavioral: Negative for agitation. The patient is not nervous/anxious.      Objective:     Vital Signs (Most Recent):  Temp: 98 °F (36.7 °C) (10/30/18 2109)  Pulse: 76 (10/30/18 2323)  Resp: 18 (10/30/18 2323)  BP: (!) 139/91 (10/30/18 2323)  SpO2: 100 % (10/30/18 2323) Vital Signs (24h Range):  Temp:  [98 °F (36.7 °C)-98.5 °F (36.9 °C)] 98 °F (36.7 °C)  Pulse:  [] 76  Resp:  [18] 18  SpO2:  [97 %-100 %] 100 %  BP: (129-139)/(80-91) 139/91     Weight: 98.9 kg (218 lb)  Body mass index is 29.57 kg/m².  Body surface area is 2.24 meters squared.    ECOG SCORE         [unfilled]    Lines/Drains/Airways     Peripherally Inserted Central Catheter Line                 PICC Double Lumen 06/22/18 1107 left basilic 130 days          Drain                 Closed/Suction Drain 05/30/18 1821 Left  Neck Bulb 15 Fr. 153 days         Closed/Suction Drain 05/30/18 1821 Left Neck Bulb 15 Fr. 153 days          Peripheral Intravenous Line                 Peripheral IV - Single Lumen 10/30/18 2050 Right Hand less than 1 day                Physical Exam   Constitutional: He is oriented to person, place, and time. He appears well-developed and well-nourished. No distress.   HENT:   See pics. L sided facial swelling. Edema of left lower lip to midline. No tongue swelling. No drooling.    Eyes: EOM are normal. Pupils are equal, round, and reactive to light. Right eye exhibits no discharge. Left eye exhibits no discharge.   Neck: Normal range of motion. Neck supple.   Cardiovascular: Normal rate, regular rhythm and intact distal pulses.   Pulmonary/Chest: Effort normal and breath sounds normal. No respiratory distress. He has no wheezes. He exhibits no tenderness.   Abdominal: Soft. Bowel sounds are normal. There is no tenderness. There is no guarding.   Musculoskeletal: Normal range of motion. He exhibits no edema.   Neurological: He is oriented to person, place, and time.   Slightly drowsy s/p IV anti-emetics but arousable to voice   Skin: Skin is warm and dry. He is not diaphoretic.   Psychiatric: He has a normal mood and affect. His behavior is normal.   Vitals reviewed.                     Significant Labs:   All pertinent labs from the last 24 hours have been reviewed.    Diagnostic Results:  I have reviewed all pertinent imaging results/findings within the past 24 hours.    Assessment/Plan:     * Left facial swelling    Nausea/Vomiting  - CTA Neck:   1. CT angiogram with mild narrowing at the left carotid bulb without hemodynamically significant stenosis, potentially secondary to post radiation changes or encasement from large soft tissue mass.  2. Nonvisualization of the left jugular vein, either thrombosed or surgically absent in this patient with history of left-sided neck exploration.  3.  Large infiltrating  soft tissue mass involving the left face and neck as above, previously hypermetabolic on PET/CT and compatible with malignancy.  4.  Multiple prominent right submental and right internal jugular chain lymph nodes, some of which were previously hypermetabolic on PET/CT and more conspicuous than prior PET/CT examination.  - No leukocytosis, afebrile, VSS. S/p 2 g IV Vancomycin in ED. Low threshold to start abx to cover for possible cellulitis.    - prn anti-emetics and pain medications, IVFs     T-cell lymphoma    - follows with Dr. Rao, last seen 10/15  Pt was being treated for peripheral T-cell lymphoma by .  At the time of diagnosis, it appeared that all of the disease was confined to his neck and most of it was surgically removed.  A PET scan in June 2018 was normal except for a lymph node just to the  left of midline in the upper neck with an SUV max of 18.2.  That node was palpable, but it disappeared after the first course of therapy.  His pretreatment LDH was normal at 175.     He has completed five courses of dose adjusted EPOCH.  The doses of cyclophosphamide and etoposide have been increased twice based on neutrophil counts and platelet counts.  Adriamycin not incrased because higher than normal risk of future cardiac disease.  Also, the dosage of vincristine was decreased slightly because of diabetes mellitus and numbness in his feet.  The numbness in the feet has not increased. PET CT done on 10/11/18.  Dr. Rao had discussed salvage treatment with Gemcitabine-Oxalipaltin-Decadron. He is planning on going to [facility] for chemo.  - per chart review on: Acyclovir, protonix 40 daily, but pt states he does not take anything but Metformin         Constipation    - prn senna     Type 2 diabetes mellitus without complication, with long-term current use of insulin    - 7/2018 hgb a1c 7.1  - home meds: Metformin 1 g BID, holding while inpatient  - LDSS + POCT glc  - repeat hgb A1c pending          VTE Risk Mitigation (From admission, onward)        Ordered     enoxaparin injection 40 mg  Daily      10/31/18 0134     Place NATO hose  Until discontinued      10/31/18 0101     Place sequential compression device  Until discontinued      10/31/18 0101     IP VTE HIGH RISK PATIENT  Once      10/31/18 0101          Disposition: admit to obs    Lisa Alvares MD  Bone Marrow Transplant  Hematology  [facility]

## 2018-10-31 NOTE — SUBJECTIVE & OBJECTIVE
Patient information was obtained from patient, past medical records and ER records.     Oncology History:    Per last clinic note: 63-year-old man who was being treated for peripheral T-cell lymphoma by .  At the time of diagnosis, it appeared that all of the disease was confined to his neck and most of it was surgically removed.  A PET scan in June 2018 was normal except for a lymph node just to the  left of midline in the upper neck with an SUV max of 18.2.  That node was palpable, but it disappeared after the first course of therapy.  His pretreatment LDH was normal at 175.     He has completed five courses of dose adjusted EPOCH.  The doses of cyclophosphamide and etoposide have been increased twice based on neutrophil counts and platelet counts.  Adriamycin not incrased because higher than normal risk of future cardiac disease.  Also, the dosage of vincristine was decreased slightly because of diabetes mellitus and numbness in his feet.  The numbness in the feet has not increased. PET CT done on 10/11/18. Dr. Rao had discussed salvage treatment with Gemcitabine-Oxalipaltin-Decadron. He is planning on going to [facility] for chemo.      (Not in a hospital admission)    Patient has no known allergies.     Past Medical History:   Diagnosis Date    Cancer     Hearing loss     Peripheral neuropathy 6/19/2018    Pre-diabetes      Past Surgical History:   Procedure Laterality Date    DISSECTION OF NECK Left 5/30/2018    Procedure: DISSECTION, NECK;  Surgeon: Regan King MD;  Location: [facility] OR [address] [place] [address];  Service: ENT;  Laterality: Left;    DISSECTION, NECK Left 5/30/2018    Performed by Regan King MD at [facility] OR [address] [place] [address]    EXCISION OF PAROTID GLAND Left 5/30/2018    Procedure: EXCISION, PAROTID GLAND;  Surgeon: Regan King MD;  Location: [facility] OR [address] [place] [address];  Service: ENT;  Laterality: Left;    EXCISION, PAROTID GLAND Left 5/30/2018    Performed by Regan King MD at  [facility] OR [facility]ND [facility]    KNEE SURGERY Right      Family History     None        Tobacco Use    Smoking status: Former Smoker     Types: Cigars    Smokeless tobacco: Never Used    Tobacco comment: 1 cigar a week   Substance and Sexual Activity    Alcohol use: Yes     Alcohol/week: 0.6 oz     Types: 1 Shots of liquor per week     Comment: 2 per month    Drug use: No    Sexual activity: Not on file       Review of Systems   Constitutional: Negative for fatigue and fever.   HENT: Positive for facial swelling (L side). Negative for congestion and rhinorrhea.    Eyes: Negative for pain and visual disturbance.   Respiratory: Negative for cough and shortness of breath.    Cardiovascular: Negative for chest pain and palpitations.   Gastrointestinal: Positive for nausea (improved s/p meds) and vomiting (improved). Negative for abdominal pain.   Genitourinary: Negative for difficulty urinating and dysuria.   Musculoskeletal: Negative for arthralgias and back pain.   Skin: Positive for color change (erythema). Negative for pallor.   Neurological: Positive for headaches (improved). Negative for dizziness.   Hematological: Negative for adenopathy. Does not bruise/bleed easily.   Psychiatric/Behavioral: Negative for agitation. The patient is not nervous/anxious.      Objective:     Vital Signs (Most Recent):  Temp: 98 °F (36.7 °C) (10/30/18 2109)  Pulse: 76 (10/30/18 2323)  Resp: 18 (10/30/18 2323)  BP: (!) 139/91 (10/30/18 2323)  SpO2: 100 % (10/30/18 2323) Vital Signs (24h Range):  Temp:  [98 °F (36.7 °C)-98.5 °F (36.9 °C)] 98 °F (36.7 °C)  Pulse:  [] 76  Resp:  [18] 18  SpO2:  [97 %-100 %] 100 %  BP: (129-139)/(80-91) 139/91     Weight: 98.9 kg (218 lb)  Body mass index is 29.57 kg/m².  Body surface area is 2.24 meters squared.    ECOG SCORE         [unfilled]    Lines/Drains/Airways     Peripherally Inserted Central Catheter Line                 PICC Double Lumen 06/22/18 1107 left basilic 130 days          Drain                  Closed/Suction Drain 05/30/18 1821 Left Neck Bulb 15 Fr. 153 days         Closed/Suction Drain 05/30/18 1821 Left Neck Bulb 15 Fr. 153 days          Peripheral Intravenous Line                 Peripheral IV - Single Lumen 10/30/18 2050 Right Hand less than 1 day                Physical Exam   Constitutional: He is oriented to person, place, and time. He appears well-developed and well-nourished. No distress.   HENT:   See pics. L sided facial swelling. Edema of left lower lip to midline. No tongue swelling. No drooling.    Eyes: EOM are normal. Pupils are equal, round, and reactive to light. Right eye exhibits no discharge. Left eye exhibits no discharge.   Neck: Normal range of motion. Neck supple.   Cardiovascular: Normal rate, regular rhythm and intact distal pulses.   Pulmonary/Chest: Effort normal and breath sounds normal. No respiratory distress. He has no wheezes. He exhibits no tenderness.   Abdominal: Soft. Bowel sounds are normal. There is no tenderness. There is no guarding.   Musculoskeletal: Normal range of motion. He exhibits no edema.   Neurological: He is oriented to person, place, and time.   Slightly drowsy s/p IV anti-emetics but arousable to voice   Skin: Skin is warm and dry. He is not diaphoretic.   Psychiatric: He has a normal mood and affect. His behavior is normal.   Vitals reviewed.                     Significant Labs:   All pertinent labs from the last 24 hours have been reviewed.    Diagnostic Results:  I have reviewed all pertinent imaging results/findings within the past 24 hours.

## 2018-11-01 VITALS
TEMPERATURE: 99 F | RESPIRATION RATE: 16 BRPM | SYSTOLIC BLOOD PRESSURE: 132 MMHG | WEIGHT: 221 LBS | BODY MASS INDEX: 29.93 KG/M2 | DIASTOLIC BLOOD PRESSURE: 81 MMHG | HEIGHT: 72 IN | OXYGEN SATURATION: 97 % | HEART RATE: 92 BPM

## 2018-11-01 PROBLEM — K59.00 CONSTIPATION: Status: RESOLVED | Noted: 2018-10-31 | Resolved: 2018-11-01

## 2018-11-01 LAB
ALBUMIN SERPL BCP-MCNC: 3.4 G/DL
ALP SERPL-CCNC: 72 U/L
ALT SERPL W/O P-5'-P-CCNC: 8 U/L
ANION GAP SERPL CALC-SCNC: 8 MMOL/L
AST SERPL-CCNC: 13 U/L
BASOPHILS # BLD AUTO: 0.01 K/UL
BASOPHILS NFR BLD: 0.3 %
BILIRUB SERPL-MCNC: 0.4 MG/DL
BUN SERPL-MCNC: 11 MG/DL
CALCIUM SERPL-MCNC: 9.3 MG/DL
CHLORIDE SERPL-SCNC: 107 MMOL/L
CO2 SERPL-SCNC: 23 MMOL/L
CREAT SERPL-MCNC: 0.8 MG/DL
DIFFERENTIAL METHOD: ABNORMAL
EOSINOPHIL # BLD AUTO: 0 K/UL
EOSINOPHIL NFR BLD: 0 %
ERYTHROCYTE [DISTWIDTH] IN BLOOD BY AUTOMATED COUNT: 15.4 %
EST. GFR  (AFRICAN AMERICAN): >60 ML/MIN/1.73 M^2
EST. GFR  (NON AFRICAN AMERICAN): >60 ML/MIN/1.73 M^2
GLUCOSE SERPL-MCNC: 171 MG/DL
HCT VFR BLD AUTO: 34 %
HGB BLD-MCNC: 10.6 G/DL
IMM GRANULOCYTES # BLD AUTO: 0.03 K/UL
IMM GRANULOCYTES NFR BLD AUTO: 0.8 %
LYMPHOCYTES # BLD AUTO: 0.6 K/UL
LYMPHOCYTES NFR BLD: 14.9 %
MAGNESIUM SERPL-MCNC: 2.2 MG/DL
MCH RBC QN AUTO: 30.6 PG
MCHC RBC AUTO-ENTMCNC: 31.2 G/DL
MCV RBC AUTO: 98 FL
MONOCYTES # BLD AUTO: 0.3 K/UL
MONOCYTES NFR BLD: 6.8 %
NEUTROPHILS # BLD AUTO: 3.1 K/UL
NEUTROPHILS NFR BLD: 77.2 %
NRBC BLD-RTO: 0 /100 WBC
PHOSPHATE SERPL-MCNC: 3 MG/DL
PLATELET # BLD AUTO: 243 K/UL
PMV BLD AUTO: 9.7 FL
POCT GLUCOSE: 167 MG/DL (ref 70–110)
POCT GLUCOSE: 176 MG/DL (ref 70–110)
POTASSIUM SERPL-SCNC: 3.8 MMOL/L
PROT SERPL-MCNC: 6.5 G/DL
RBC # BLD AUTO: 3.46 M/UL
SODIUM SERPL-SCNC: 138 MMOL/L
WBC # BLD AUTO: 3.95 K/UL

## 2018-11-01 PROCEDURE — 63600175 PHARM REV CODE 636 W HCPCS: Performed by: INTERNAL MEDICINE

## 2018-11-01 PROCEDURE — G0378 HOSPITAL OBSERVATION PER HR: HCPCS

## 2018-11-01 PROCEDURE — 25000003 PHARM REV CODE 250: Performed by: INTERNAL MEDICINE

## 2018-11-01 PROCEDURE — 92610 EVALUATE SWALLOWING FUNCTION: CPT

## 2018-11-01 PROCEDURE — 85025 COMPLETE CBC W/AUTO DIFF WBC: CPT

## 2018-11-01 PROCEDURE — S0028 INJECTION, FAMOTIDINE, 20 MG: HCPCS | Performed by: INTERNAL MEDICINE

## 2018-11-01 PROCEDURE — 99217 PR OBSERVATION CARE DISCHARGE: CPT | Mod: ,,, | Performed by: INTERNAL MEDICINE

## 2018-11-01 PROCEDURE — 92523 SPEECH SOUND LANG COMPREHEN: CPT

## 2018-11-01 PROCEDURE — 63600175 PHARM REV CODE 636 W HCPCS: Performed by: STUDENT IN AN ORGANIZED HEALTH CARE EDUCATION/TRAINING PROGRAM

## 2018-11-01 PROCEDURE — 84100 ASSAY OF PHOSPHORUS: CPT

## 2018-11-01 PROCEDURE — 36415 COLL VENOUS BLD VENIPUNCTURE: CPT

## 2018-11-01 PROCEDURE — 80053 COMPREHEN METABOLIC PANEL: CPT

## 2018-11-01 PROCEDURE — 83735 ASSAY OF MAGNESIUM: CPT

## 2018-11-01 RX ORDER — PREDNISONE 20 MG/1
60 TABLET ORAL ONCE
Status: COMPLETED | OUTPATIENT
Start: 2018-11-01 | End: 2018-11-01

## 2018-11-01 RX ORDER — PREDNISONE 20 MG/1
60 TABLET ORAL DAILY
Qty: 12 TABLET | Refills: 0 | Status: SHIPPED | OUTPATIENT
Start: 2018-11-01 | End: 2018-11-05

## 2018-11-01 RX ADMIN — FAMOTIDINE 20 MG: 10 INJECTION, SOLUTION INTRAVENOUS at 10:11

## 2018-11-01 RX ADMIN — DIPHENHYDRAMINE HYDROCHLORIDE 25 MG: 50 INJECTION, SOLUTION INTRAMUSCULAR; INTRAVENOUS at 05:11

## 2018-11-01 RX ADMIN — DEXAMETHASONE SODIUM PHOSPHATE 4 MG: 4 INJECTION, SOLUTION INTRAMUSCULAR; INTRAVENOUS at 05:11

## 2018-11-01 RX ADMIN — PREDNISONE 60 MG: 20 TABLET ORAL at 01:11

## 2018-11-01 RX ADMIN — DIPHENHYDRAMINE HYDROCHLORIDE 25 MG: 50 INJECTION, SOLUTION INTRAMUSCULAR; INTRAVENOUS at 01:11

## 2018-11-01 NOTE — PROGRESS NOTES
[facility]  Hematology  Bone Marrow Transplant  Progress Note    Patient Name: Arjun Mcpherson Jr.  Admission Date: 10/30/2018  Hospital Length of Stay: 0 days  Code Status: Prior    Subjective:     Interval History: No acute overnight events. Patient states he feels facial swelling has improved slightly. He wishes to seek treatment at [facility]. He was discharged on oral prednisone w/ f/u appt w/ Dr. Rao in clinic.     Objective:     Vital Signs (Most Recent):  Temp: 98.5 °F (36.9 °C) (11/01/18 1114)  Pulse: 92 (11/01/18 1114)  Resp: 16 (11/01/18 1114)  BP: 132/81 (11/01/18 1114)  SpO2: 97 % (11/01/18 1114) Vital Signs (24h Range):  Temp:  [97.6 °F (36.4 °C)-98.5 °F (36.9 °C)] 98.5 °F (36.9 °C)  Pulse:  [81-93] 92  Resp:  [14-18] 16  SpO2:  [95 %-99 %] 97 %  BP: (119-141)/(67-84) 132/81     Weight: 100.3 kg (221 lb 0.2 oz)  Body mass index is 29.97 kg/m².  Body surface area is 2.26 meters squared.    ECOG SCORE         [unfilled]    Intake/Output - Last 3 Shifts       10/30 0700 - 10/31 0659 10/31 0700 - 11/01 0659 11/01 0700 - 11/02 0659    P.O.   1080    I.V. (mL/kg)  1708.8 (17)     Total Intake(mL/kg)  1708.8 (17) 1080 (10.8)    Net  +1708.8 +1080           Urine Occurrence  1 x           Physical Exam   Constitutional: He is oriented to person, place, and time. He appears well-developed and well-nourished. No distress.   HENT:   L sided facial swelling. Edema of left upper and lower lips No tongue swelling. No drooling.    Eyes: EOM are normal. Pupils are equal, round, and reactive to light. Right eye exhibits no discharge. Left eye exhibits no discharge.   Neck: Normal range of motion. Neck supple.   Cardiovascular: Normal rate, regular rhythm and intact distal pulses.   Pulmonary/Chest: Effort normal and breath sounds normal. No respiratory distress. He has no wheezes. He exhibits no tenderness.   Abdominal: Soft. Bowel sounds are normal. There is no tenderness. There is no  guarding.   Musculoskeletal: Normal range of motion. He exhibits no edema.   Neurological: He is alert and oriented to person, place, and time.   Skin: Skin is warm and dry. He is not diaphoretic.   Psychiatric: He has a normal mood and affect. His behavior is normal.   Vitals reviewed.      Significant Labs:   CBC:   Recent Labs   Lab 10/30/18  2050 10/30/18  2056 10/31/18  0400 11/01/18  0515   WBC 4.19  --  7.86 3.95   HGB 11.6*  --  10.6* 10.6*   HCT 36.6* 35* 33.6* 34.0*     --  196 243    and CMP:   Recent Labs   Lab 10/30/18  2050 10/31/18  0541 11/01/18  0515    136 138   K 4.2 4.5 3.8    107 107   CO2 25 22* 23   * 172* 171*   BUN 10 9 11   CREATININE 0.8 0.7 0.8   CALCIUM 9.5 9.2 9.3   PROT 7.1 6.6 6.5   ALBUMIN 3.6 3.6 3.4*   BILITOT 0.4 0.5 0.4   ALKPHOS 77 75 72   AST 30 19 13   ALT 11 10 8*   ANIONGAP 6* 7* 8   EGFRNONAA >60.0 >60.0 >60.0       Diagnostic Results:  I have reviewed all pertinent imaging results/findings within the past 24 hours.    Assessment/Plan:     * Left facial swelling    Nausea/Vomiting  - CTA Neck:   1. CT angiogram with mild narrowing at the left carotid bulb without hemodynamically significant stenosis, potentially secondary to post radiation changes or encasement from large soft tissue mass.  2. Nonvisualization of the left jugular vein, either thrombosed or surgically absent in this patient with history of left-sided neck exploration.  3.  Large infiltrating soft tissue mass involving the left face and neck as above, previously hypermetabolic on PET/CT and compatible with malignancy.  4.  Multiple prominent right submental and right internal jugular chain lymph nodes, some of which were previously hypermetabolic on PET/CT and more conspicuous than prior PET/CT examination.     Type 2 diabetes mellitus without complication, with long-term current use of insulin    - 7/2018 hgb a1c 7.1  - home meds: Metformin 1 g BID, holding while inpatient  - LDSS +  POCT glc  - hgb A1c 5.0, however, unsure if patient has had any recent transfusions  Will discharge on home metformin qing as patient to be discharged on high dose prednisone and can f/u w/ PCP     T-cell lymphoma    - follows with Dr. Roa, last seen 10/15  Pt was being treated for peripheral T-cell lymphoma by .  At the time of diagnosis, it appeared that all of the disease was confined to his neck and most of it was surgically removed.  A PET scan in June 2018 was normal except for a lymph node just to the  left of midline in the upper neck with an SUV max of 18.2.  That node was palpable, but it disappeared after the first course of therapy.  His pretreatment LDH was normal at 175.     He has completed five courses of dose adjusted EPOCH.  The doses of cyclophosphamide and etoposide have been increased twice based on neutrophil counts and platelet counts.  Adriamycin not incrased because higher than normal risk of future cardiac disease.  Also, the dosage of vincristine was decreased slightly because of diabetes mellitus and numbness in his feet.  The numbness in the feet has not increased. PET CT done on 10/11/18. Dr. Rao had discussed salvage treatment with Gemcitabine-Oxalipaltin-Decadron. He is planning on going to [facility] for chemo.  - per he does not take anything but Metformin    C/w prednisone 60mg x 4 more days  F/u w/ Dr. Rao in clinic on 11/6  Patient will need to be started on chemotherapy to treat relapsed T cell lymphoma           VTE Risk Mitigation (From admission, onward)        Ordered     IP VTE HIGH RISK PATIENT  Once      10/31/18 0101          Disposition:     Alex Colindres MD  Bone Marrow Transplant  [facility]

## 2018-11-01 NOTE — HOSPITAL COURSE
Patient admitted due to progressively worsening L-sided neck and facial plethora. CT head revealed no CT evidence of acute intracranial abnormality. CTA neck demonstrated: nonvisualization of the left jugular vein, either thrombosed or surgically absent in this patient with history of left-sided neck exploration. Large infiltrating soft tissue mass involving the left face and neck as above, previously hypermetabolic on PET/CT and compatible with malignancy. Multiple prominent right submental and right internal jugular chain lymph nodes. He was started on oral steroids and patient stated he noted mild improvement of the swelling. Patient counseled on importance of receiving chemotherapy to treat the malignancy and swelling. He made the decision to f/u and receive chemotherapy at [facility]. He was discharged on oral prednisone and an outpatient appointment was made to f/u w/ Dr. Rao in clinic. He was advised on signs and symptoms to monitor which if present should prompt a visit to the ED including worsening facial swelling or airway compromise.

## 2018-11-01 NOTE — PLAN OF CARE
Problem: Fall Risk (Adult)  Goal: Identify Related Risk Factors and Signs and Symptoms  Related risk factors and signs and symptoms are identified upon initiation of Human Response Clinical Practice Guideline (CPG)   11/01/18 0012   Fall Risk   Related Risk Factors (Fall Risk) age-related changes;culprit medication(s);fatigue/slow reaction   Signs and Symptoms (Fall Risk) presence of risk factors

## 2018-11-01 NOTE — PLAN OF CARE
Problem: Patient Care Overview  Goal: Plan of Care Review  Outcome: Outcome(s) achieved Date Met: 11/01/18  Pt with no falls or injuries this shift. Pt with no s/s hypo or hyperglycemia this shift. Pt afebrile, no s/s infection this shift.

## 2018-11-01 NOTE — DISCHARGE SUMMARY
[facility]  Hematology  Bone Marrow Transplant  Discharge Summary      Patient Name: Arjun Mcpherson Jr.  MRN: [id number]  Admission Date: 10/30/2018  Hospital Length of Stay: 0 days  Discharge Date and Time: 11/1/2018  1:42 PM  Attending Physician: No att. providers found   Discharging Provider: Alex Colindres MD  Primary Care Provider: Jason Santo MD    HPI: Mr. Arjun Mcpherson, 63 y.o. male with PMH of DM2, peripheral neuropathy, and T-cell lymphoma, presents to the ED with a complaint of left sided facial swelling. He states he noticed his lip swelling 2 days ago and it has mildly progressed toward the middle of his lip. He complains of headache. He notes erythema to his left lower cheek. He reports it is difficult to open his jaw. He denies tongue swelling, difficulty swallowing, chest pain, shortness of breath. He denies diet changes or changes in soap or detergents at home. He has a left upper extremity PICC line. His oncologist is Dr. Parmer at [facility], [phone].     Case was discussed with Dr. Parmer, oncologist at [facility]. He had a recent biopsy that showed peripheral T cell lymphoma and imaging that was concerning for internal jugular vein thrombosis. Recommended CT of the neck and abx. In the ED, labs with no leukocytosis, nl lactate, nl CMP.  Blood cultures pending. Pt protecting airway- no change in facial swelling, drooling for development of stridor.    CTA reviewed which shows narrowing of the left carotid without significant stenosis, nonvisualization of the left jugular vein which could be surgically absent versus thrombosis, large infiltrating tissue mass involving the neck and face previously seen recent imaging.  These findings were discussed with Dr. Chang ([phone]) who recommended patient come to ED at [facility] for admission. She recommended d.c on Augmentin. However, upon dishcharge, pt noted to be vomiting.  Zofran 8 mg, benadryl 25  mg IV, and Decadron 8 mg IV, and Phenergan given. Dr. Chang updated with new events and pt admitted to obs for supportive care. Can evaluate for transfer if necessary when clinically stable.             * No surgery found *     Hospital Course: Patient admitted due to progressively worsening L-sided neck and facial plethora. CT head revealed no CT evidence of acute intracranial abnormality. CTA neck demonstrated: nonvisualization of the left jugular vein, either thrombosed or surgically absent in this patient with history of left-sided neck exploration. Large infiltrating soft tissue mass involving the left face and neck as above, previously hypermetabolic on PET/CT and compatible with malignancy. Multiple prominent right submental and right internal jugular chain lymph nodes. He was started on oral steroids and patient stated he noted mild improvement of the swelling. Patient counseled on importance of receiving chemotherapy to treat the malignancy and swelling. He made the decision to f/u and receive chemotherapy at [facility]. He was discharged on oral prednisone and an outpatient appointment was made to f/u w/ Dr. Rao in clinic. He was advised on signs and symptoms to monitor which if present should prompt a visit to the ED including worsening facial swelling or airway compromise.        Significant Diagnostic Studies: Labs:   CMP   Recent Labs   Lab 10/30/18  2050 10/31/18  0541 11/01/18  0515    136 138   K 4.2 4.5 3.8    107 107   CO2 25 22* 23   * 172* 171*   BUN 10 9 11   CREATININE 0.8 0.7 0.8   CALCIUM 9.5 9.2 9.3   PROT 7.1 6.6 6.5   ALBUMIN 3.6 3.6 3.4*   BILITOT 0.4 0.5 0.4   ALKPHOS 77 75 72   AST 30 19 13   ALT 11 10 8*   ANIONGAP 6* 7* 8   ESTGFRAFRICA >60.0 >60.0 >60.0   EGFRNONAA >60.0 >60.0 >60.0   , CBC   Recent Labs   Lab 10/30/18  2050  10/31/18  0400 11/01/18  0515   WBC 4.19  --  7.86 3.95   HGB 11.6*  --  10.6* 10.6*   HCT 36.6*   < > 33.6* 34.0*     --  196  243    < > = values in this interval not displayed.    and A1C:   Recent Labs   Lab 06/19/18  1058 07/12/18  0823 10/31/18  0400   HGBA1C 6.8* 7.1* 5.0     Radiology: CT scan: CTA neck 1.  CT angiogram with mild narrowing at the left carotid bulb without hemodynamically significant stenosis, potentially secondary to post radiation changes or encasement from large soft tissue mass.    2. Nonvisualization of the left jugular vein, either thrombosed or surgically absent in this patient with history of left-sided neck exploration.    3.  Large infiltrating soft tissue mass involving the left face and neck as above, previously hypermetabolic on PET/CT and compatible with malignancy.    4.  Multiple prominent right submental and right internal jugular chain lymph nodes, some of which were previously hypermetabolic on PET/CT and more conspicuous than prior PET/CT examination.    Pending Diagnostic Studies:     None        Final Active Diagnoses:    Diagnosis Date Noted POA    PRINCIPAL PROBLEM:  Left facial swelling [R22.0] 10/31/2018 Yes    Type 2 diabetes mellitus without complication, with long-term current use of insulin [E11.9, Z79.4] 10/31/2018 Not Applicable    T-cell lymphoma [C85.90] 06/11/2018 Yes      Problems Resolved During this Admission:    Diagnosis Date Noted Date Resolved POA    Constipation [K59.00] 10/31/2018 11/01/2018 Yes      Discharged Condition: stable    Disposition: Home or Self Care    Follow Up:  Follow-up Information     Follow up On 10/31/2018.    Contact information:  please report to [facility] ED for admission by oncology.  If trouble breathing or worsening symptoms, go to closest ED           Brodie Rao MD On 11/6/2018.    Specialty:  Hematology and Oncology  Why:  Labs, follow up- requested if you are not able to get to [facility] over the weekend  Contact information:  Daryl VELMA ARTURO  [facility] [id number] [phone]                 Patient Instructions:   No discharge  "procedures on file.  Medications:  Reconciled Home Medications:      Medication List      START taking these medications    predniSONE 20 MG tablet  Commonly known as:  DELTASONE  Take 3 tablets (60 mg total) by mouth once daily. for 4 days        CONTINUE taking these medications    blood sugar diagnostic Strp  To check BG 4 times daily, to use with insurance preferred meter     blood-glucose meter kit  To check BG 4 times daily, to use with insurance preferred meter     lancets Misc  To check BG 4 times daily, to use with insurance preferred meter     metFORMIN 500 MG tablet  Commonly known as:  GLUCOPHAGE  Take 2 tablets (1,000 mg total) by mouth 2 (two) times daily with meals.     ondansetron 8 MG tablet  Commonly known as:  ZOFRAN  Take 1 tablet (8 mg total) by mouth every 8 (eight) hours as needed for Nausea.     oxyCODONE-acetaminophen 5-325 mg per tablet  Commonly known as:  PERCOCET  1 or 2 tablets every 6 hours as needed for pain     pen needle, diabetic 32 gauge x 5/32" Ndle  Commonly known as:  BD ULTRA-FINE CORI PEN NEEDLE  To use 3 time daily with insulin        STOP taking these medications    acyclovir 800 MG Tab  Commonly known as:  ZOVIRAX            Alex Colindres MD  Bone Marrow Transplant  [facility]  "

## 2018-11-01 NOTE — PT/OT/SLP EVAL
"Speech Language Pathology Evaluation  Cognitive/Bedside Swallow/  Discharge Summary    Patient Name:  Arjun Mcpherson Jr.   MRN:  [id number]   OBS 08/OBS 08A    Admitting Diagnosis: Left facial swelling    Recommendations:                  General Recommendations:  Follow-up not indicated  Diet recommendations:  Regular, Thin   Aspiration Precautions:   · Fully awake and alert for PO intake  · Fully upright position for PO intake  · Small bites/ sips  · Slow rate of eating/ drinking  · 1 bite/ sip @ a time  · Refrain from talking prior to swallow completion   · Discontinue PO upon: coughing, throat clearing, choking, wet vocal quality, wet breath sounds, watery eyes, reddened facial features  General Precautions: Standard, fall    History:     Past Medical History:   Diagnosis Date    Cancer     Hearing loss     Peripheral neuropathy 6/19/2018    Pre-diabetes      Past Surgical History:   Procedure Laterality Date    DISSECTION OF NECK Left 5/30/2018    Procedure: DISSECTION, NECK;  Surgeon: Regan King MD;  Location: [facility] OR [address] [place] [address];  Service: ENT;  Laterality: Left;    DISSECTION, NECK Left 5/30/2018    Performed by Regan King MD at [facility] OR [address] [place] [address]    EXCISION OF PAROTID GLAND Left 5/30/2018    Procedure: EXCISION, PAROTID GLAND;  Surgeon: Regan King MD;  Location: [facility] OR [address] [place] [address];  Service: ENT;  Laterality: Left;    EXCISION, PAROTID GLAND Left 5/30/2018    Performed by Regan King MD at [facility] OR [address] [place] [address]    KNEE SURGERY Right      Social History: Per pt, lives with his wife and 27yo son. Drives. Is not responsible for his finances.     Prior diet: Per pt, regular consistency solids and thin liquids.     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 However has not worked since May when he underwent L neck dissection.     Subjective     "The doctor said when they did the neck dissection they " "knicked a nerve." Per pt, he occasionally experienced "slurred" speech 2/2 this. Unappreciated this evaluation.     Pain/Comfort:  · Pain Rating 1: 0/10  · Pain Rating Post-Intervention 1: 0/10    Objective:     Cognitive Status:    · Orientation WFL  · Answered 3/3 long term memory q's ind'ly   · During immediate memory task recalled series of 5, 1-digit numbers ind'ly and series of 5 unrelated words ind'ly   · Answered 4/4 basic problem solving q's ind'ly   · Sequenced series of 4 words according to given attribute ind'ly  · Generated comparison/ contrast between 2 similar, common objects ind'ly  · Appeared WFL in spontaneous conversation with clinician      Receptive Language:   · Answered 7/7 complex y/n q's ind'ly  · Followed 4/4 complex directions ind'ly  · Appeared WFL in spontaneous conversation with clinician     Pragmatics:    · WFL    Expressive Language:  · Answered 6/6 responsive naming q's ind'ly  · Appeared WFL in spontaneous conversation with clinician       Motor Speech:  · WFL    Voice:   · WFL    Oral Musculature Evaluation  · Oral Musculature: gross asymmetry present(L facial/ neck edema)  · Dentition: present and adequate  · Secretion Management: adequate  · Mucosal Quality: good  · Mandibular Strength and Mobility: WFL  · Oral Labial Strength and Mobility: functional retraction, functional pursing, other (see comments)(Dec'd L retraction appeared 2/2 L facial edema)  · Lingual Strength and Mobility: functional lateral movement, functional protrusion, other (see comments)(R deviation appeared 2/2 L facial edema)  · Velar Elevation: WFL  · Buccal Strength and Mobility: WFL  · Volitional Cough: WFL  · Volitional Swallow: Reduced hyolaryngeal excursion/ elevation via cervical palpation appeared 2/2 left facial/ neck edema    Bedside Swallow Eval:   Consistencies Assessed:  · Thin water- ~6oz via continuous cup sip x1 taken in isolation and continuous straw sip x1 taken as regular consistency solid " liquid wash  · Regular consistency solid ivette cracker- 1 cracker via bites x2-3    Oral Phase:   · Appeared WFL    Pharyngeal Phase:   · No overt clinical signs aspiration observed    Treatment:   Pt awake and alert upon entry. Observed to ind'ly raise his HOB to fully upright position upon clinician introduction. Education provided re: role of SLP, definition/ risk/ overt clinical signs aspiration, aspiration precautions listed above, and SLP POC. Encouragement provided to request SLP re-consult should he experience any swallowing and/or cognitive linguistic changes. No further questions.     Assessment:     Arjun Mcphersno Jr. is a 63 y.o. male with no further SLP needs at this time. Please re-consult should changes occur.     Goals:   Multidisciplinary Problems     SLP Goals        Problem: SLP Goal    Goal Priority Disciplines Outcome   SLP Goal     SLP Ongoing (interventions implemented as appropriate)                 Plan:     · Plan of Care reviewed with:  patient   · SLP Follow-Up:  No       Discharge recommendations:  Discharge Facility/Level Of Care Needs: other (see comments)(Currently no SLP needs upon hospital d/c)     Time Tracking:     SLP Treatment Date:   11/01/18  Speech Start Time:  1004  Speech Stop Time:  1015     Speech Total Time (min):  11 min    Billable Minutes: Eval 6  and Eval Swallow and Oral Function 5    LUMA Franklin, CCC-SLP  [phone]  11/1/2018

## 2018-11-01 NOTE — ASSESSMENT & PLAN NOTE
Nausea/Vomiting  - CTA Neck:   1. CT angiogram with mild narrowing at the left carotid bulb without hemodynamically significant stenosis, potentially secondary to post radiation changes or encasement from large soft tissue mass.  2. Nonvisualization of the left jugular vein, either thrombosed or surgically absent in this patient with history of left-sided neck exploration.  3.  Large infiltrating soft tissue mass involving the left face and neck as above, previously hypermetabolic on PET/CT and compatible with malignancy.  4.  Multiple prominent right submental and right internal jugular chain lymph nodes, some of which were previously hypermetabolic on PET/CT and more conspicuous than prior PET/CT examination.

## 2018-11-01 NOTE — ASSESSMENT & PLAN NOTE
- 7/2018 hgb a1c 7.1  - home meds: Metformin 1 g BID, holding while inpatient  - LDSS + POCT glc  - hgb A1c 5.0, however, unsure if patient has had any recent transfusions  Will discharge on home metformin qing as patient to be discharged on high dose prednisone and can f/u w/ PCP

## 2018-11-01 NOTE — PLAN OF CARE
Problem: SLP Goal  Goal: SLP Goal  Outcome: Ongoing (interventions implemented as appropriate)  Clinical evaluation of swallow and speech/ language/ cognitive evaluation complete. Recommend regular consistency solids and thin liquids. Pt appears at cognitive-linguistic baseline. No further acute SLP needs at this time. Please re-consult should changes occur.     LUMA Franklin, CCC-SLP  [phone]  11/1/2018

## 2018-11-01 NOTE — ASSESSMENT & PLAN NOTE
- follows with Dr. Rao, last seen 10/15  Pt was being treated for peripheral T-cell lymphoma by .  At the time of diagnosis, it appeared that all of the disease was confined to his neck and most of it was surgically removed.  A PET scan in June 2018 was normal except for a lymph node just to the  left of midline in the upper neck with an SUV max of 18.2.  That node was palpable, but it disappeared after the first course of therapy.  His pretreatment LDH was normal at 175.     He has completed five courses of dose adjusted EPOCH.  The doses of cyclophosphamide and etoposide have been increased twice based on neutrophil counts and platelet counts.  Adriamycin not incrased because higher than normal risk of future cardiac disease.  Also, the dosage of vincristine was decreased slightly because of diabetes mellitus and numbness in his feet.  The numbness in the feet has not increased. PET CT done on 10/11/18. Dr. Rao had discussed salvage treatment with Gemcitabine-Oxalipaltin-Decadron. He is planning on going to [facility] for chemo.  - per he does not take anything but Metformin    C/w prednisone 60mg x 4 more days  F/u w/ Dr. Rao in clinic on 11/6  Patient will need to be started on chemotherapy to treat relapsed T cell lymphoma

## 2018-11-01 NOTE — PLAN OF CARE
MDR's with Dr Beck.  Patient's swelling to face is ongoing.  No respiratory or swallowing difficulties.  Would like to d/c home and try to get to [facility] over there weekend.   requested a f/u with Dr Rao, for Monday, in the event he can not get to [facility].  No d/c needs anticipated.      Follow-up Information     Follow up On 10/31/2018.    Contact information:  please report to [facility] ED for admission by oncology.  If trouble breathing or worsening symptoms, go to closest ED           Brodie Rao MD On 11/5/2018.    Specialty:  Hematology and Oncology  Why:  Labs, follow up- requested if you are not able to get to [facility] over the weekend  Contact information:  [address] [facility] [address]  [phone]                    11/01/18 1153   Final Note   Assessment Type Final Discharge Note   Anticipated Discharge Disposition Home   What phone number can be called within the next 1-3 days to see how you are doing after discharge? ([phone])   Hospital Follow Up  Appt(s) scheduled? (message sent to oncology clinic)   Discharge plans and expectations educations in teach back method with documentation complete? Yes

## 2018-11-01 NOTE — PLAN OF CARE
Problem: Fall Risk (Adult)  Goal: Absence of Falls  Patient will demonstrate the desired outcomes by discharge/transition of care.   11/01/18 0013   Fall Risk (Adult)   Absence of Falls making progress toward outcome

## 2018-11-01 NOTE — SUBJECTIVE & OBJECTIVE
Subjective:     Interval History: No acute overnight events. Patient states he feels facial swelling has improved slightly. He wishes to seek treatment at [facility]. He was discharged on oral prednisone w/ f/u appt w/ Dr. Rao in clinic.     Objective:     Vital Signs (Most Recent):  Temp: 98.5 °F (36.9 °C) (11/01/18 1114)  Pulse: 92 (11/01/18 1114)  Resp: 16 (11/01/18 1114)  BP: 132/81 (11/01/18 1114)  SpO2: 97 % (11/01/18 1114) Vital Signs (24h Range):  Temp:  [97.6 °F (36.4 °C)-98.5 °F (36.9 °C)] 98.5 °F (36.9 °C)  Pulse:  [81-93] 92  Resp:  [14-18] 16  SpO2:  [95 %-99 %] 97 %  BP: (119-141)/(67-84) 132/81     Weight: 100.3 kg (221 lb 0.2 oz)  Body mass index is 29.97 kg/m².  Body surface area is 2.26 meters squared.    ECOG SCORE         [unfilled]    Intake/Output - Last 3 Shifts       10/30 0700 - 10/31 0659 10/31 0700 - 11/01 0659 11/01 0700 - 11/02 0659    P.O.   1080    I.V. (mL/kg)  1708.8 (17)     Total Intake(mL/kg)  1708.8 (17) 1080 (10.8)    Net  +1708.8 +1080           Urine Occurrence  1 x           Physical Exam   Constitutional: He is oriented to person, place, and time. He appears well-developed and well-nourished. No distress.   HENT:   L sided facial swelling. Edema of left upper and lower lips No tongue swelling. No drooling.    Eyes: EOM are normal. Pupils are equal, round, and reactive to light. Right eye exhibits no discharge. Left eye exhibits no discharge.   Neck: Normal range of motion. Neck supple.   Cardiovascular: Normal rate, regular rhythm and intact distal pulses.   Pulmonary/Chest: Effort normal and breath sounds normal. No respiratory distress. He has no wheezes. He exhibits no tenderness.   Abdominal: Soft. Bowel sounds are normal. There is no tenderness. There is no guarding.   Musculoskeletal: Normal range of motion. He exhibits no edema.   Neurological: He is alert and oriented to person, place, and time.   Skin: Skin is warm and dry. He is not diaphoretic.   Psychiatric:  He has a normal mood and affect. His behavior is normal.   Vitals reviewed.      Significant Labs:   CBC:   Recent Labs   Lab 10/30/18  2050 10/30/18  2056 10/31/18  0400 11/01/18  0515   WBC 4.19  --  7.86 3.95   HGB 11.6*  --  10.6* 10.6*   HCT 36.6* 35* 33.6* 34.0*     --  196 243    and CMP:   Recent Labs   Lab 10/30/18  2050 10/31/18  0541 11/01/18  0515    136 138   K 4.2 4.5 3.8    107 107   CO2 25 22* 23   * 172* 171*   BUN 10 9 11   CREATININE 0.8 0.7 0.8   CALCIUM 9.5 9.2 9.3   PROT 7.1 6.6 6.5   ALBUMIN 3.6 3.6 3.4*   BILITOT 0.4 0.5 0.4   ALKPHOS 77 75 72   AST 30 19 13   ALT 11 10 8*   ANIONGAP 6* 7* 8   EGFRNONAA >60.0 >60.0 >60.0       Diagnostic Results:  I have reviewed all pertinent imaging results/findings within the past 24 hours.

## 2018-11-02 ENCOUNTER — INFUSION (OUTPATIENT)
Dept: INFUSION THERAPY | Facility: HOSPITAL | Age: 63
End: 2018-11-02
Attending: INTERNAL MEDICINE
Payer: COMMERCIAL

## 2018-11-02 DIAGNOSIS — C85.90 T-CELL LYMPHOMA: Primary | ICD-10-CM

## 2018-11-02 PROCEDURE — 63600175 PHARM REV CODE 636 W HCPCS: Performed by: INTERNAL MEDICINE

## 2018-11-02 PROCEDURE — A4216 STERILE WATER/SALINE, 10 ML: HCPCS | Performed by: INTERNAL MEDICINE

## 2018-11-02 PROCEDURE — 96523 IRRIG DRUG DELIVERY DEVICE: CPT

## 2018-11-02 PROCEDURE — 25000003 PHARM REV CODE 250: Performed by: INTERNAL MEDICINE

## 2018-11-02 RX ORDER — HEPARIN 100 UNIT/ML
500 SYRINGE INTRAVENOUS
Status: COMPLETED | OUTPATIENT
Start: 2018-11-02 | End: 2018-11-02

## 2018-11-02 RX ORDER — HEPARIN 100 UNIT/ML
500 SYRINGE INTRAVENOUS
Status: CANCELLED | OUTPATIENT
Start: 2018-11-02

## 2018-11-02 RX ORDER — SODIUM CHLORIDE 0.9 % (FLUSH) 0.9 %
10 SYRINGE (ML) INJECTION
Status: COMPLETED | OUTPATIENT
Start: 2018-11-02 | End: 2018-11-02

## 2018-11-02 RX ORDER — SODIUM CHLORIDE 0.9 % (FLUSH) 0.9 %
10 SYRINGE (ML) INJECTION
Status: CANCELLED | OUTPATIENT
Start: 2018-11-02

## 2018-11-02 RX ADMIN — HEPARIN 500 UNITS: 100 SYRINGE at 09:11

## 2018-11-02 RX ADMIN — Medication 10 ML: at 09:11

## 2018-11-02 NOTE — NURSING
PICC line dressing changed. Both lumens flushed and heparin locked. Pt discharged home indpendently

## 2018-11-04 LAB
BACTERIA BLD CULT: NORMAL
BACTERIA BLD CULT: NORMAL

## 2018-11-09 ENCOUNTER — INFUSION (OUTPATIENT)
Dept: INFUSION THERAPY | Facility: HOSPITAL | Age: 63
End: 2018-11-09
Attending: INTERNAL MEDICINE
Payer: COMMERCIAL

## 2018-11-09 DIAGNOSIS — C85.90 T-CELL LYMPHOMA: Primary | ICD-10-CM

## 2018-11-09 PROCEDURE — A4216 STERILE WATER/SALINE, 10 ML: HCPCS | Performed by: INTERNAL MEDICINE

## 2018-11-09 PROCEDURE — 96523 IRRIG DRUG DELIVERY DEVICE: CPT

## 2018-11-09 PROCEDURE — 63600175 PHARM REV CODE 636 W HCPCS: Performed by: INTERNAL MEDICINE

## 2018-11-09 PROCEDURE — 25000003 PHARM REV CODE 250: Performed by: INTERNAL MEDICINE

## 2018-11-09 RX ORDER — SODIUM CHLORIDE 0.9 % (FLUSH) 0.9 %
10 SYRINGE (ML) INJECTION
Status: COMPLETED | OUTPATIENT
Start: 2018-11-09 | End: 2018-11-09

## 2018-11-09 RX ORDER — HEPARIN 100 UNIT/ML
500 SYRINGE INTRAVENOUS
Status: CANCELLED | OUTPATIENT
Start: 2018-11-09

## 2018-11-09 RX ORDER — HEPARIN 100 UNIT/ML
500 SYRINGE INTRAVENOUS
Status: COMPLETED | OUTPATIENT
Start: 2018-11-09 | End: 2018-11-09

## 2018-11-09 RX ORDER — SODIUM CHLORIDE 0.9 % (FLUSH) 0.9 %
10 SYRINGE (ML) INJECTION
Status: CANCELLED | OUTPATIENT
Start: 2018-11-09

## 2018-11-09 RX ADMIN — HEPARIN 500 UNITS: 100 SYRINGE at 10:11

## 2018-11-09 RX ADMIN — Medication 10 ML: at 10:11

## 2018-11-09 NOTE — NURSING
Pt arrived for PICC dsg change and flush.  Both lumens flush easily with good blood return.  Dressing and end caps changed.  Pt states going to [facility] on Monday.  Pt discharged to home with wife.

## 2018-11-14 ENCOUNTER — PATIENT MESSAGE (OUTPATIENT)
Dept: HEMATOLOGY/ONCOLOGY | Facility: CLINIC | Age: 63
End: 2018-11-14

## 2018-11-15 ENCOUNTER — HOSPITAL ENCOUNTER (INPATIENT)
Facility: HOSPITAL | Age: 63
LOS: 5 days | DRG: 603 | End: 2018-11-20
Attending: EMERGENCY MEDICINE | Admitting: INTERNAL MEDICINE
Payer: COMMERCIAL

## 2018-11-15 DIAGNOSIS — Z51.5 PALLIATIVE CARE ENCOUNTER: ICD-10-CM

## 2018-11-15 DIAGNOSIS — C85.90 T-CELL LYMPHOMA: ICD-10-CM

## 2018-11-15 DIAGNOSIS — R22.0 LEFT FACIAL SWELLING: ICD-10-CM

## 2018-11-15 DIAGNOSIS — L13.9 ACUTE BULLOUS DERMATITIS: Primary | ICD-10-CM

## 2018-11-15 DIAGNOSIS — Z71.89 GOALS OF CARE, COUNSELING/DISCUSSION: ICD-10-CM

## 2018-11-15 DIAGNOSIS — L02.01 FACIAL ABSCESS: ICD-10-CM

## 2018-11-15 PROBLEM — R13.10 DYSPHAGIA: Status: ACTIVE | Noted: 2018-11-15

## 2018-11-15 LAB
ALBUMIN SERPL BCP-MCNC: 3.2 G/DL
ALP SERPL-CCNC: 98 U/L
ALT SERPL W/O P-5'-P-CCNC: 17 U/L
ANION GAP SERPL CALC-SCNC: 11 MMOL/L
AST SERPL-CCNC: 33 U/L
BASOPHILS # BLD AUTO: 0.03 K/UL
BASOPHILS NFR BLD: 0.9 %
BILIRUB SERPL-MCNC: 0.8 MG/DL
BUN SERPL-MCNC: 5 MG/DL
CALCIUM SERPL-MCNC: 9.1 MG/DL
CHLORIDE SERPL-SCNC: 102 MMOL/L
CO2 SERPL-SCNC: 23 MMOL/L
CREAT SERPL-MCNC: 0.9 MG/DL (ref 0.5–1.4)
CREAT SERPL-MCNC: 1 MG/DL
DIFFERENTIAL METHOD: ABNORMAL
EOSINOPHIL # BLD AUTO: 0.2 K/UL
EOSINOPHIL NFR BLD: 4.5 %
ERYTHROCYTE [DISTWIDTH] IN BLOOD BY AUTOMATED COUNT: 13.8 %
EST. GFR  (AFRICAN AMERICAN): >60 ML/MIN/1.73 M^2
EST. GFR  (NON AFRICAN AMERICAN): >60 ML/MIN/1.73 M^2
ESTIMATED AVG GLUCOSE: 103 MG/DL
GLUCOSE SERPL-MCNC: 187 MG/DL
HBA1C MFR BLD HPLC: 5.2 %
HCT VFR BLD AUTO: 39.6 %
HGB BLD-MCNC: 12.5 G/DL
IMM GRANULOCYTES # BLD AUTO: 0.01 K/UL
IMM GRANULOCYTES NFR BLD AUTO: 0.3 %
LACTATE SERPL-SCNC: 2.3 MMOL/L
LACTATE SERPL-SCNC: 3.7 MMOL/L
LYMPHOCYTES # BLD AUTO: 0.7 K/UL
LYMPHOCYTES NFR BLD: 21.5 %
MAGNESIUM SERPL-MCNC: 2 MG/DL
MCH RBC QN AUTO: 30.2 PG
MCHC RBC AUTO-ENTMCNC: 31.6 G/DL
MCV RBC AUTO: 96 FL
MONOCYTES # BLD AUTO: 0.6 K/UL
MONOCYTES NFR BLD: 16.7 %
NEUTROPHILS # BLD AUTO: 1.9 K/UL
NEUTROPHILS NFR BLD: 56.1 %
NRBC BLD-RTO: 0 /100 WBC
PLATELET # BLD AUTO: 159 K/UL
PMV BLD AUTO: 9.5 FL
POTASSIUM SERPL-SCNC: 4 MMOL/L
PROT SERPL-MCNC: 6.4 G/DL
RBC # BLD AUTO: 4.14 M/UL
SAMPLE: NORMAL
SODIUM SERPL-SCNC: 136 MMOL/L
VANCOMYCIN SERPL-MCNC: 16.5 UG/ML
WBC # BLD AUTO: 3.3 K/UL

## 2018-11-15 PROCEDURE — 25500020 PHARM REV CODE 255: Performed by: EMERGENCY MEDICINE

## 2018-11-15 PROCEDURE — 96375 TX/PRO/DX INJ NEW DRUG ADDON: CPT

## 2018-11-15 PROCEDURE — 25000003 PHARM REV CODE 250: Performed by: STUDENT IN AN ORGANIZED HEALTH CARE EDUCATION/TRAINING PROGRAM

## 2018-11-15 PROCEDURE — 81001 URINALYSIS AUTO W/SCOPE: CPT

## 2018-11-15 PROCEDURE — 63600175 PHARM REV CODE 636 W HCPCS: Performed by: STUDENT IN AN ORGANIZED HEALTH CARE EDUCATION/TRAINING PROGRAM

## 2018-11-15 PROCEDURE — 96366 THER/PROPH/DIAG IV INF ADDON: CPT

## 2018-11-15 PROCEDURE — 80202 ASSAY OF VANCOMYCIN: CPT

## 2018-11-15 PROCEDURE — 63600175 PHARM REV CODE 636 W HCPCS: Performed by: EMERGENCY MEDICINE

## 2018-11-15 PROCEDURE — 83605 ASSAY OF LACTIC ACID: CPT | Mod: 91

## 2018-11-15 PROCEDURE — 93005 ELECTROCARDIOGRAM TRACING: CPT

## 2018-11-15 PROCEDURE — 99285 EMERGENCY DEPT VISIT HI MDM: CPT | Mod: 25

## 2018-11-15 PROCEDURE — 96367 TX/PROPH/DG ADDL SEQ IV INF: CPT

## 2018-11-15 PROCEDURE — 87040 BLOOD CULTURE FOR BACTERIA: CPT | Mod: 59

## 2018-11-15 PROCEDURE — 93010 ELECTROCARDIOGRAM REPORT: CPT | Mod: ,,, | Performed by: INTERNAL MEDICINE

## 2018-11-15 PROCEDURE — 96365 THER/PROPH/DIAG IV INF INIT: CPT

## 2018-11-15 PROCEDURE — 99285 EMERGENCY DEPT VISIT HI MDM: CPT | Mod: ,,, | Performed by: EMERGENCY MEDICINE

## 2018-11-15 PROCEDURE — 99251 PR INITIAL INPATIENT CONSULT,LEVL I: CPT | Mod: 25,,, | Performed by: OTOLARYNGOLOGY

## 2018-11-15 PROCEDURE — 83605 ASSAY OF LACTIC ACID: CPT

## 2018-11-15 PROCEDURE — 20600001 HC STEP DOWN PRIVATE ROOM

## 2018-11-15 PROCEDURE — 96361 HYDRATE IV INFUSION ADD-ON: CPT

## 2018-11-15 PROCEDURE — 0CJS8ZZ INSPECTION OF LARYNX, VIA NATURAL OR ARTIFICIAL OPENING ENDOSCOPIC: ICD-10-PCS | Performed by: OTOLARYNGOLOGY

## 2018-11-15 PROCEDURE — 80053 COMPREHEN METABOLIC PANEL: CPT

## 2018-11-15 PROCEDURE — 87070 CULTURE OTHR SPECIMN AEROBIC: CPT

## 2018-11-15 PROCEDURE — 85025 COMPLETE CBC W/AUTO DIFF WBC: CPT

## 2018-11-15 PROCEDURE — 31575 DIAGNOSTIC LARYNGOSCOPY: CPT | Mod: ,,, | Performed by: OTOLARYNGOLOGY

## 2018-11-15 PROCEDURE — 83735 ASSAY OF MAGNESIUM: CPT

## 2018-11-15 PROCEDURE — 83036 HEMOGLOBIN GLYCOSYLATED A1C: CPT

## 2018-11-15 RX ORDER — GABAPENTIN 100 MG/1
100 CAPSULE ORAL 3 TIMES DAILY
Status: DISCONTINUED | OUTPATIENT
Start: 2018-11-16 | End: 2018-11-20

## 2018-11-15 RX ORDER — GABAPENTIN 100 MG/1
100 CAPSULE ORAL 3 TIMES DAILY
COMMUNITY

## 2018-11-15 RX ORDER — ALLOPURINOL 300 MG/1
300 TABLET ORAL DAILY
COMMUNITY

## 2018-11-15 RX ORDER — ALLOPURINOL 300 MG/1
300 TABLET ORAL DAILY
Status: DISCONTINUED | OUTPATIENT
Start: 2018-11-16 | End: 2018-11-20 | Stop reason: HOSPADM

## 2018-11-15 RX ORDER — GLUCAGON 1 MG
1 KIT INJECTION
Status: DISCONTINUED | OUTPATIENT
Start: 2018-11-15 | End: 2018-11-20 | Stop reason: HOSPADM

## 2018-11-15 RX ORDER — SYRING-NEEDL,DISP,INSUL,0.3 ML 29 G X1/2"
296 SYRINGE, EMPTY DISPOSABLE MISCELLANEOUS
Status: DISCONTINUED | OUTPATIENT
Start: 2018-11-15 | End: 2018-11-15

## 2018-11-15 RX ORDER — IBUPROFEN 200 MG
24 TABLET ORAL
Status: DISCONTINUED | OUTPATIENT
Start: 2018-11-15 | End: 2018-11-20 | Stop reason: HOSPADM

## 2018-11-15 RX ORDER — VANCOMYCIN HCL IN 5 % DEXTROSE 1.5G/250ML
15 PLASTIC BAG, INJECTION (ML) INTRAVENOUS
Status: COMPLETED | OUTPATIENT
Start: 2018-11-15 | End: 2018-11-15

## 2018-11-15 RX ORDER — OXYCODONE AND ACETAMINOPHEN 10; 325 MG/1; MG/1
1 TABLET ORAL EVERY 4 HOURS PRN
Status: DISCONTINUED | OUTPATIENT
Start: 2018-11-15 | End: 2018-11-16

## 2018-11-15 RX ORDER — HYDROMORPHONE HYDROCHLORIDE 1 MG/ML
0.5 INJECTION, SOLUTION INTRAMUSCULAR; INTRAVENOUS; SUBCUTANEOUS
Status: COMPLETED | OUTPATIENT
Start: 2018-11-15 | End: 2018-11-15

## 2018-11-15 RX ORDER — VANCOMYCIN HCL IN 5 % DEXTROSE 1.5G/250ML
15 PLASTIC BAG, INJECTION (ML) INTRAVENOUS
Status: DISCONTINUED | OUTPATIENT
Start: 2018-11-16 | End: 2018-11-18

## 2018-11-15 RX ORDER — PSEUDOEPHEDRINE/ACETAMINOPHEN 30MG-500MG
100 TABLET ORAL
Status: DISCONTINUED | OUTPATIENT
Start: 2018-11-15 | End: 2018-11-15

## 2018-11-15 RX ORDER — SODIUM CHLORIDE 0.9 % (FLUSH) 0.9 %
5 SYRINGE (ML) INJECTION
Status: DISCONTINUED | OUTPATIENT
Start: 2018-11-15 | End: 2018-11-20 | Stop reason: HOSPADM

## 2018-11-15 RX ORDER — SENNOSIDES 8.6 MG/1
1 TABLET ORAL DAILY
COMMUNITY

## 2018-11-15 RX ORDER — IBUPROFEN 200 MG
16 TABLET ORAL
Status: DISCONTINUED | OUTPATIENT
Start: 2018-11-15 | End: 2018-11-20 | Stop reason: HOSPADM

## 2018-11-15 RX ORDER — HYDROMORPHONE HYDROCHLORIDE 1 MG/ML
0.5 INJECTION, SOLUTION INTRAMUSCULAR; INTRAVENOUS; SUBCUTANEOUS
Status: DISCONTINUED | OUTPATIENT
Start: 2018-11-15 | End: 2018-11-15

## 2018-11-15 RX ORDER — ONDANSETRON 8 MG/1
8 TABLET, ORALLY DISINTEGRATING ORAL EVERY 8 HOURS PRN
Status: DISCONTINUED | OUTPATIENT
Start: 2018-11-15 | End: 2018-11-19

## 2018-11-15 RX ADMIN — SODIUM CHLORIDE 1000 ML: 0.9 INJECTION, SOLUTION INTRAVENOUS at 07:11

## 2018-11-15 RX ADMIN — ONDANSETRON 8 MG: 8 TABLET, ORALLY DISINTEGRATING ORAL at 11:11

## 2018-11-15 RX ADMIN — IOHEXOL 100 ML: 350 INJECTION, SOLUTION INTRAVENOUS at 08:11

## 2018-11-15 RX ADMIN — HYDROMORPHONE HYDROCHLORIDE 0.5 MG: 1 INJECTION, SOLUTION INTRAMUSCULAR; INTRAVENOUS; SUBCUTANEOUS at 08:11

## 2018-11-15 RX ADMIN — PIPERACILLIN AND TAZOBACTAM 4.5 G: 4; .5 INJECTION, POWDER, LYOPHILIZED, FOR SOLUTION INTRAVENOUS; PARENTERAL at 03:11

## 2018-11-15 RX ADMIN — PIPERACILLIN AND TAZOBACTAM 4.5 G: 4; .5 INJECTION, POWDER, LYOPHILIZED, FOR SOLUTION INTRAVENOUS; PARENTERAL at 10:11

## 2018-11-15 RX ADMIN — VANCOMYCIN HYDROCHLORIDE 1500 MG: 10 INJECTION, POWDER, LYOPHILIZED, FOR SOLUTION INTRAVENOUS at 06:11

## 2018-11-15 NOTE — LETTER
Fax Transmission                                                                                                                                                       Date: November 16, 2018       To:  Amy Membreno From: [facility], [facility]   Fax:  [phone] Fax:    Phone:  [phone] Phone: [phone]     Special Instructions:     For questions or issues, please contact department listed on attached report.    Regarding Arjun Mcpherson  Please note, additional records can be found in Care Everywhere                      If there are any problems with this transmission, please call immediately. Thank you.    Confidentiality notice: The accompanying facsimile is intended solely for the use of the recipient designated above. Document(s) transmitted herewith may contain information that is confidential and privileged. Delivery, distribution or dissemination of this communication other than to the intended recipient is strictly prohibited. If you have received this facsimile in error, please notify [facility]'s Corporate Integrity Department immediately by telephone at [phone].

## 2018-11-15 NOTE — ED PROVIDER NOTES
Encounter Date: 11/15/2018       History     Chief Complaint   Patient presents with    Facial Swelling     Pt has lymphoma with most recent chemo last Monday. Pt developed left facial swelling over about a week. Pt denies trouble breathing. Pt reports some difficulty swallowing.     Mr. Mcpherson is a 63M with peripheral T-cell lymphoma (last seen by Dr. Rao, patient of Dr. Franco, currently receiving treatment at [facility]), DM2 on metformin here for swelling and blistering of his face. This started Tuesday after receiving his first chemo cycle of pembrolizumab and romidepsin on Monday at [facility] - he is currently involved in a trial there. He has had L facial swelling prior to starting the trial, but there has been progressive inward swelling into his oral cavity, resulting in difficulty swallowing and sometimes difficulty breathing as well. There is also skin changes involving blistering and oozing. He denies any pain, fever, chills. There is also swelling on his L chest with accompanying erythema, but with no pain.     Oncologic history:     Patient with L neck growth 3/2018, which was initially evaluated by his local PCP and treated with an antibiotic. After no improvement they had done an excisional biopsy of his L neck adenopathy that revealed peripheral T-cell lymphoma. This was followed by surgical removal of his disease as it appeared to be confined to his neck. PET scan in 6/2018 normal save for a LN that had disappeared after first cycle of EPOCH therapy. There was rapid growth of L cervical LAD confirmed by PET after C4, and patient has since completed 5 courses of EPOCH prior to being seen at [facility]. He is currently involved in a study and received his first cycle of pembrolizumab and romidepsin this past Monday 11/12/2018.          Review of patient's allergies indicates:  No Known Allergies  Past Medical History:   Diagnosis Date    Cancer     Hearing loss     Peripheral neuropathy  6/19/2018    Pre-diabetes      Past Surgical History:   Procedure Laterality Date    DISSECTION OF NECK Left 5/30/2018    Procedure: DISSECTION, NECK;  Surgeon: Regan King MD;  Location: [facility] OR [address] [place] [address];  Service: ENT;  Laterality: Left;    DISSECTION, NECK Left 5/30/2018    Performed by Regan King MD at [facility] OR [address] [place] [address]    EXCISION OF PAROTID GLAND Left 5/30/2018    Procedure: EXCISION, PAROTID GLAND;  Surgeon: Regan King MD;  Location: [facility] OR [address] [place] [address];  Service: ENT;  Laterality: Left;    EXCISION, PAROTID GLAND Left 5/30/2018    Performed by Regan King MD at [facility] OR [address] [place] [address]    KNEE SURGERY Right      History reviewed. No pertinent family history.  Social History     Tobacco Use    Smoking status: Former Smoker     Types: Cigars    Smokeless tobacco: Never Used    Tobacco comment: 1 cigar a week   Substance Use Topics    Alcohol use: Yes     Alcohol/week: 0.6 oz     Types: 1 Shots of liquor per week     Comment: 2 per month    Drug use: No     Review of Systems   Constitutional: Negative for chills and fever.   HENT: Positive for trouble swallowing (solids and liquids). Negative for congestion and rhinorrhea.    Eyes: Negative for pain and visual disturbance.   Respiratory: Positive for choking. Negative for shortness of breath and wheezing.    Cardiovascular: Negative for chest pain, palpitations and leg swelling.   Gastrointestinal: Negative for abdominal distention, abdominal pain, constipation, diarrhea, nausea and vomiting.   Genitourinary: Negative for dysuria, hematuria and urgency.   Musculoskeletal: Positive for neck pain (chronic). Negative for arthralgias, gait problem and joint swelling.   Skin: Positive for rash and wound.   Neurological: Positive for facial asymmetry (2/2 L facial swelling). Negative for tremors, syncope and weakness.   Psychiatric/Behavioral: Negative for agitation, behavioral problems, confusion and dysphoric mood.       Physical  Exam     Initial Vitals [11/15/18 1705]   BP Pulse Resp Temp SpO2   (!) 154/95 (!) 114 20 98.6 °F (37 °C) 99 %      MAP       --         Physical Exam    Constitutional: He appears well-developed and well-nourished. He is not diaphoretic. No distress.   HENT:   Head:       Mouth/Throat: Oropharynx is clear and moist. No oropharyngeal exudate.       Eyes: Conjunctivae are normal. Pupils are equal, round, and reactive to light. No scleral icterus.   Neck: Erythema present.       Cardiovascular: Normal rate, regular rhythm, normal heart sounds and intact distal pulses.   No murmur heard.  Pulmonary/Chest: Breath sounds normal. He has no wheezes. He has no rales.   Abdominal: Soft. Bowel sounds are normal. He exhibits no distension. There is no tenderness.   Musculoskeletal: Normal range of motion. He exhibits no edema or tenderness.   Lymphadenopathy:     He has no cervical adenopathy.   Neurological: He is alert and oriented to person, place, and time. He has normal strength.   Skin: Rash noted. Rash is vesicular. There is erythema.   Psychiatric: He has a normal mood and affect. His behavior is normal. Judgment and thought content normal.                 ED Course   Procedures  Labs Reviewed   CBC W/ AUTO DIFFERENTIAL - Abnormal; Notable for the following components:       Result Value    WBC 3.30 (*)     RBC 4.14 (*)     Hemoglobin 12.5 (*)     Hematocrit 39.6 (*)     MCHC 31.6 (*)     Lymph # 0.7 (*)     Mono% 16.7 (*)     All other components within normal limits   COMPREHENSIVE METABOLIC PANEL - Abnormal; Notable for the following components:    Glucose 187 (*)     BUN, Bld 5 (*)     Albumin 3.2 (*)     All other components within normal limits   LACTIC ACID, PLASMA - Abnormal; Notable for the following components:    Lactate (Lactic Acid) 3.7 (*)     All other components within normal limits   CULTURE, BLOOD   CULTURE, BLOOD   CULTURE, BODY FLUID - BACTEC   MAGNESIUM   URINALYSIS, REFLEX TO URINE CULTURE    VANCOMYCIN, RANDOM   HEMOGLOBIN A1C   LACTIC ACID, PLASMA   ISTAT CREATININE   POCT CREATININE          Imaging Results          CT Chest With Contrast (Final result)  Result time 11/15/18 20:55:46   Procedure changed from CTA Chest Non Coronary     Final result by Esau Coulter MD (11/15/18 20:55:46)                 Impression:      New bilateral axillary adenopathy, left side greater than right, with surrounding inflammatory fat stranding.  Findings could represent recurrent malignancy or infectious process.  Recommend correlation with clinical findings and continued follow-up.    Soft tissue thickening and inflammatory fat stranding in the lower neck and upper chest.  Left internal jugular vein is not definitively opacified.  Consider evaluation with ultrasound if there is concern for left upper extremity venous thrombus.  Please refer to same day CT neck for additional findings.    No pulmonary parenchymal disease or mediastinal adenopathy.    Electronically signed by resident: Sameer Chaudhari  Date:    11/15/2018  Time:    20:10    Electronically signed by: Esau Coulter MD  Date:    11/15/2018  Time:    20:55             Narrative:    EXAMINATION:  CT CHEST WITH CONTRAST    CLINICAL HISTORY:  Patient with large amount of swelling and erythema on face and chest;    TECHNIQUE:  Low dose axial images, sagittal and coronal reformations were obtained from the thoracic inlet to the lung bases following the IV administration of 100 mL of Omnipaque 350.    COMPARISON:  Nuclear medicine PET-CT 10/11/2018.  CT chest, 05/29/2018.    FINDINGS:  Base of Neck: Please refer to dedicated neck CT for findings above the thoracic inlet.    Thoracic soft tissues: Soft tissue thickening and fat stranding throughout the subcutaneous soft tissues at the superior portion of the chest.  There is a centrally hypodense 2.3 cm left axillary lymph node in the area of prior concern for residual disease with surrounding fat  stranding.  2.1 cm right axillary lymph node, new from prior PET-CT.  Multiple additional prominent left axillary lymph nodes are also new when compared with the prior PET-CT.  Of note, the left internal jugular vein is not definitively opacified.    Aorta: Left-sided aortic arch.  No aneurysm and no significant atherosclerosis    Heart: Normal size. No effusion.    Pulmonary vasculature: No large or central pulmonary embolus, noting that this exam was not tailored for evaluation of pulmonary emboli.    Melissa/Mediastinum: No hilar or mediastinal lymphadenopathy.  Cervical and supraclavicular adenopathy is better characterized on same day CT neck.    Airways: Patent.    Lungs/Pleura: Clear lungs. No pleural effusion or thickening.    Esophagus: Unremarkable.    Upper Abdomen: No acute finding.    Bones: No acute fracture. No suspicious lytic or sclerotic lesions.                               CT Soft Tissue Neck With Contrast (Final result)     Abnormal  Result time 11/15/18 21:38:30   Procedure changed from CTA Neck     Final result by Noel Valencia MD (11/15/18 21:38:30)                 Impression:      Diffuse abnormal soft tissue attenuation throughout the left anterior neck and face regions, significantly increased since prior, containing at least 3 hypoattenuating component suggestive of a necrotic tissue, as detailed above.  Developing abscess/myonecrosis or tumor recurrence remain in the differential diagnosis.  Suggest clinical correlation and short-term follow-up.    Mild narrowing of the hypopharyngeal airway secondary to mass effect from soft tissue involvement left parapharyngeal regions.  Continued short-term follow-up is suggested.    Small amount of retropharyngeal effusion.  This is most likely reactive to the vascular compression in the left aspect of the neck.  Consider short-term follow-up.    Again, left internal jugular vein is not seen possibly thrombosed or surgically absent.    Additional  findings as above.    This report was flagged in Epic as abnormal.    Electronically signed by resident: Juan Kinney  Date:    11/15/2018  Time:    20:21    Electronically signed by: Noel Valencia MD  Date:    11/15/2018  Time:    21:38             Narrative:    EXAMINATION:  CT SOFT TISSUE NECK WITH CONTRAST    CLINICAL HISTORY:  Patient with large swelling and erythema on face and neck;    TECHNIQUE:  Low dose axial images, sagittal and coronal reformations were performed from the skull base to the level of the clavicles.  Contrast was not administered.    COMPARISON:  CT chest 11/15/2018.  CTA neck 10/30/2018.  CT soft tissue neck 05/18/2018.    FINDINGS:  Visualized portion of the intracranial compartment is unremarkable.  Orbital and intraorbital content are unremarkable.  Minimal mucosal thickening in the right maxillary sinus and partial opacification of the left mastoid air cells.  Otherwise, remaining paranasal sinuses are essentially clear.    Postsurgical changes in the left face from prior left parotid gland resection and left neck dissection.  The right parotid and submandibular glands are unremarkable.  The gland is unremarkable.    Again identified diffuse abnormal soft tissue attenuation throughout the left neck and face which appear significantly larger than prior containing measuring 17 cm in CC dimension with more soft tissue attenuation with interval development of 3 new hypoattenuating internal component with no peripheral enhancement.  The largest 2 are in the left anterior neck measuring 3.8 x 3.1 cm and 2.3 x 3.0 cm.  The second hypoattenuating lesion is in the left anterior face measuring 1.8 x 1.4 cm, these are better seen on coronal reformat.    There are multiple enlarged lymph nodes throughout the cervical region.    The pharynx and larynx are unremarkable.  There is mild narrowing of the hypopharyngeal airway asymmetric to the left secondary to soft tissue/tumor involvement..  There  "is a small amount of retropharyngeal effusion.  Please see dedicated report of the chest for further evaluation of the chest.  There is the left-sided PICC line terminating in the SVC.    Vasculature: Common carotid, internal and external carotid arteries are patent. Right internal jugular is patent. Left jugular vein is partially visualized and likely thrombosed.    Osseous structure are unremarkable.                                 Medical Decision Making:   History:   Old Medical Records: I decided to obtain old medical records.  Old Records Summarized: records from clinic visits and records from another hospital.  Initial Assessment:   Patient is a 63M with peripheral T-cell lymphoma, DM2 on metformin here for evaluation of L facial swelling and accompanying skin changes.   Differential Diagnosis:   Cellulitis  Pemphigus vulgaris  Bullous pemphigoid  Staphylococcal scalded skin syndrome  Bullous impetigo  Pemphigus  Chemotherapy-induced dermatitis  Clinical Tests:   Lab Tests: Ordered and Reviewed  Radiological Study: Ordered  Medical Tests: Ordered and Reviewed  ED Management:  7:05 PM  Consulted ENT, who have evaluated patient. No airway compromise at this time.  Consulted Hem/onc who would like a call-back after imaging results return.  Patient started on vancomycin and zosyn, 1L NS given.     8:30 PM  0.5mg IV dilaudid given for pain       APC / Resident Notes:   Patient with T-cell lymphoma diagnosed in 5/2018 who had gone through 5C of EPOCH therapy with recurrence of tumor and is currently enrolled in a study at [facility]. Onset of dermatologic changes coincides with timing of first treatment this past Monday. Airway compromise was considered in patient with increasing swelling within his oral cavity - HENT evaluation showed patent airway with likely little difficulty if intubation needed. CT head and neck with "diffuse abnormal soft tissue attenuation throughout the left anterior neck and face regions, " "significantly increased since prior, containing at least 3 hypoattenuating component suggestive of a necrotic tissue, as detailed above.  Developing abscess/myonecrosis or tumor recurrence remain in the differential diagnosis" and "new bilateral axillary adenopathy, left side greater than right, with surrounding inflammatory fat stranding.  Findings could represent recurrent malignancy or infectious process." Skin changes concerning for chemotherapy-induced dermatitis vs bullous pemphigus or some pemphigoid reaction vs other infectious process. Blood and wound cultures placed. Lactic acid at 3.7, patient received 1L NS. Patient to be admitted to med-onc service for further evaluation and management.                     Clinical Impression:   There were no encounter diagnoses.      Disposition:   Disposition: Admitted  Condition: Roly Almonte Lalithaarpita Liu MD  Resident  11/15/18 8316    "

## 2018-11-16 PROBLEM — Z51.5 PALLIATIVE CARE ENCOUNTER: Status: ACTIVE | Noted: 2018-11-16

## 2018-11-16 PROBLEM — J38.01 VOCAL CORD PARALYSIS, UNILATERAL PARTIAL: Status: ACTIVE | Noted: 2018-11-16

## 2018-11-16 LAB
ALBUMIN SERPL BCP-MCNC: 3.1 G/DL
ALP SERPL-CCNC: 90 U/L
ALT SERPL W/O P-5'-P-CCNC: 15 U/L
ANION GAP SERPL CALC-SCNC: 9 MMOL/L
AST SERPL-CCNC: 29 U/L
BACTERIA #/AREA URNS AUTO: NORMAL /HPF
BASOPHILS # BLD AUTO: 0.02 K/UL
BASOPHILS NFR BLD: 0.8 %
BILIRUB SERPL-MCNC: 0.7 MG/DL
BILIRUB UR QL STRIP: NEGATIVE
BUN SERPL-MCNC: 5 MG/DL
CALCIUM SERPL-MCNC: 9 MG/DL
CHLORIDE SERPL-SCNC: 103 MMOL/L
CLARITY UR REFRACT.AUTO: CLEAR
CO2 SERPL-SCNC: 26 MMOL/L
COLOR UR AUTO: YELLOW
CREAT SERPL-MCNC: 0.9 MG/DL
DIFFERENTIAL METHOD: ABNORMAL
EOSINOPHIL # BLD AUTO: 0.2 K/UL
EOSINOPHIL NFR BLD: 6.4 %
ERYTHROCYTE [DISTWIDTH] IN BLOOD BY AUTOMATED COUNT: 13.7 %
EST. GFR  (AFRICAN AMERICAN): >60 ML/MIN/1.73 M^2
EST. GFR  (NON AFRICAN AMERICAN): >60 ML/MIN/1.73 M^2
GLUCOSE SERPL-MCNC: 118 MG/DL
GLUCOSE UR QL STRIP: NEGATIVE
HCT VFR BLD AUTO: 38 %
HGB BLD-MCNC: 12 G/DL
HGB UR QL STRIP: ABNORMAL
IMM GRANULOCYTES # BLD AUTO: 0 K/UL
IMM GRANULOCYTES NFR BLD AUTO: 0 %
KETONES UR QL STRIP: ABNORMAL
LEUKOCYTE ESTERASE UR QL STRIP: NEGATIVE
LYMPHOCYTES # BLD AUTO: 0.6 K/UL
LYMPHOCYTES NFR BLD: 22.4 %
MAGNESIUM SERPL-MCNC: 2 MG/DL
MCH RBC QN AUTO: 30.1 PG
MCHC RBC AUTO-ENTMCNC: 31.6 G/DL
MCV RBC AUTO: 95 FL
MICROSCOPIC COMMENT: NORMAL
MONOCYTES # BLD AUTO: 0.5 K/UL
MONOCYTES NFR BLD: 18.4 %
NEUTROPHILS # BLD AUTO: 1.3 K/UL
NEUTROPHILS NFR BLD: 52 %
NITRITE UR QL STRIP: NEGATIVE
NRBC BLD-RTO: 0 /100 WBC
PH UR STRIP: 5 [PH] (ref 5–8)
PHOSPHATE SERPL-MCNC: 3.2 MG/DL
PLATELET # BLD AUTO: 129 K/UL
PMV BLD AUTO: 9.8 FL
POTASSIUM SERPL-SCNC: 3.7 MMOL/L
PROT SERPL-MCNC: 6 G/DL
PROT UR QL STRIP: NEGATIVE
RBC # BLD AUTO: 3.99 M/UL
RBC #/AREA URNS AUTO: 2 /HPF (ref 0–4)
SODIUM SERPL-SCNC: 138 MMOL/L
SP GR UR STRIP: >=1.03 (ref 1–1.03)
URATE SERPL-MCNC: 3.4 MG/DL
URN SPEC COLLECT METH UR: ABNORMAL
WBC # BLD AUTO: 2.5 K/UL
WBC #/AREA URNS AUTO: 2 /HPF (ref 0–5)

## 2018-11-16 PROCEDURE — 63600175 PHARM REV CODE 636 W HCPCS: Performed by: INTERNAL MEDICINE

## 2018-11-16 PROCEDURE — 99223 1ST HOSP IP/OBS HIGH 75: CPT | Mod: ,,, | Performed by: INTERNAL MEDICINE

## 2018-11-16 PROCEDURE — 80053 COMPREHEN METABOLIC PANEL: CPT

## 2018-11-16 PROCEDURE — 92610 EVALUATE SWALLOWING FUNCTION: CPT

## 2018-11-16 PROCEDURE — 63600175 PHARM REV CODE 636 W HCPCS: Performed by: STUDENT IN AN ORGANIZED HEALTH CARE EDUCATION/TRAINING PROGRAM

## 2018-11-16 PROCEDURE — 83735 ASSAY OF MAGNESIUM: CPT

## 2018-11-16 PROCEDURE — 20600001 HC STEP DOWN PRIVATE ROOM

## 2018-11-16 PROCEDURE — 25000003 PHARM REV CODE 250: Performed by: STUDENT IN AN ORGANIZED HEALTH CARE EDUCATION/TRAINING PROGRAM

## 2018-11-16 PROCEDURE — 84550 ASSAY OF BLOOD/URIC ACID: CPT

## 2018-11-16 PROCEDURE — 87070 CULTURE OTHR SPECIMN AEROBIC: CPT

## 2018-11-16 PROCEDURE — 25000003 PHARM REV CODE 250: Performed by: INTERNAL MEDICINE

## 2018-11-16 PROCEDURE — 99223 1ST HOSP IP/OBS HIGH 75: CPT | Mod: ,,, | Performed by: EMERGENCY MEDICINE

## 2018-11-16 PROCEDURE — 87075 CULTR BACTERIA EXCEPT BLOOD: CPT

## 2018-11-16 PROCEDURE — 84100 ASSAY OF PHOSPHORUS: CPT

## 2018-11-16 PROCEDURE — 85025 COMPLETE CBC W/AUTO DIFF WBC: CPT

## 2018-11-16 PROCEDURE — 97165 OT EVAL LOW COMPLEX 30 MIN: CPT

## 2018-11-16 RX ORDER — MORPHINE SULFATE 4 MG/ML
2 INJECTION, SOLUTION INTRAMUSCULAR; INTRAVENOUS EVERY 6 HOURS PRN
Status: DISCONTINUED | OUTPATIENT
Start: 2018-11-16 | End: 2018-11-19

## 2018-11-16 RX ORDER — SULFAMETHOXAZOLE AND TRIMETHOPRIM 200; 40 MG/5ML; MG/5ML
40 SUSPENSION ORAL EVERY 12 HOURS
Status: DISCONTINUED | OUTPATIENT
Start: 2018-11-16 | End: 2018-11-19

## 2018-11-16 RX ORDER — ENOXAPARIN SODIUM 100 MG/ML
40 INJECTION SUBCUTANEOUS EVERY 24 HOURS
Status: DISCONTINUED | OUTPATIENT
Start: 2018-11-16 | End: 2018-11-20 | Stop reason: HOSPADM

## 2018-11-16 RX ORDER — HYDROMORPHONE HYDROCHLORIDE 1 MG/ML
1 INJECTION, SOLUTION INTRAMUSCULAR; INTRAVENOUS; SUBCUTANEOUS EVERY 6 HOURS PRN
Status: DISCONTINUED | OUTPATIENT
Start: 2018-11-16 | End: 2018-11-19

## 2018-11-16 RX ADMIN — PIPERACILLIN AND TAZOBACTAM 4.5 G: 4; .5 INJECTION, POWDER, LYOPHILIZED, FOR SOLUTION INTRAVENOUS; PARENTERAL at 10:11

## 2018-11-16 RX ADMIN — GABAPENTIN 100 MG: 100 CAPSULE ORAL at 08:11

## 2018-11-16 RX ADMIN — ENOXAPARIN SODIUM 40 MG: 100 INJECTION SUBCUTANEOUS at 05:11

## 2018-11-16 RX ADMIN — SULFAMETHOXAZOLE AND TRIMETHOPRIM 40 ML: 200; 40 SUSPENSION ORAL at 08:11

## 2018-11-16 RX ADMIN — PIPERACILLIN AND TAZOBACTAM 4.5 G: 4; .5 INJECTION, POWDER, LYOPHILIZED, FOR SOLUTION INTRAVENOUS; PARENTERAL at 01:11

## 2018-11-16 RX ADMIN — GABAPENTIN 100 MG: 100 CAPSULE ORAL at 02:11

## 2018-11-16 RX ADMIN — PIPERACILLIN AND TAZOBACTAM 4.5 G: 4; .5 INJECTION, POWDER, LYOPHILIZED, FOR SOLUTION INTRAVENOUS; PARENTERAL at 06:11

## 2018-11-16 RX ADMIN — Medication 1 MG: at 07:11

## 2018-11-16 RX ADMIN — VANCOMYCIN HYDROCHLORIDE 1500 MG: 10 INJECTION, POWDER, LYOPHILIZED, FOR SOLUTION INTRAVENOUS at 05:11

## 2018-11-16 RX ADMIN — MORPHINE SULFATE 2 MG: 4 INJECTION, SOLUTION INTRAMUSCULAR; INTRAVENOUS at 07:11

## 2018-11-16 RX ADMIN — WHITE PETROLATUM: 1.75 OINTMENT TOPICAL at 08:11

## 2018-11-16 RX ADMIN — ALLOPURINOL 300 MG: 300 TABLET ORAL at 08:11

## 2018-11-16 NOTE — H&P
[facility]  Hematology/Oncology  H&P    Patient Name: Arjun Mcpherson Jr.  MRN: [id number]  Admission Date: 11/15/2018  Code Status: Full Code   Attending Provider: Brodie Rao MD  Primary Care Physician: Jason Santo MD  Principal Problem:Left facial swelling    Subjective:     HPI: Mr. Mcpherson is a 63M with peripheral T-cell lymphoma (last seen by Dr. Rao, patient of Dr. Reids, previously on EPOCH, currently receiving keytruda and Romidepsin treatment at [facility]), DM2 on metformin here for swelling and blistering of his face.     These symptoms started worsening 2.5 weeks ago. Patient was admitted from 10/30-11/1 for progressively worsening L-sided neck and facial plethora. CT head revealed no acute intracranial abnormality. CTA neck demonstrated a large infiltrating soft tissue mass involving the left face and neck as above, previously hypermetabolic on PET/CT and compatible with malignancy. Multiple prominent right submental and right internal jugular chain lymph nodes. He was started on oral steroids and patient stated he noted mild improvement of the swelling. Patient counseled on importance of receiving chemotherapy to treat the malignancy and swelling. He made the decision to f/u and receive chemotherapy at [facility]. He was discharged on oral prednisone and an outpatient appointment was made to f/u w/ Dr. Rao in clinic. He was advised on signs and symptoms to monitor which if present should prompt a visit to the ED including worsening facial swelling or airway compromise    Patient noted that his facial swelling got worse and developed blisters after receiving his first chemo cycle of pembrolizumab and romidepsin on Monday at [facility] - he is currently involved in a trial there. He has had L facial swelling prior to starting the trial, but there has been progressive inward swelling into his oral cavity, resulting in difficulty swallowing and sometimes difficulty  breathing as well. There is also skin changes involving blistering and oozing. He denies any pain, fever, chills. There is also swelling on his L chest with accompanying erythema, but with no pain.     CT scan in the ED shows mild narrowing of his airway and small retro-pharyndeal effusion. ENT was consulted in the ED, no not feel that there is airway compromise.  They did find, however, that L vocal cord shows signs of paralysis. Blood and wound cultures placed. Lactic acid at 3.7, patient received 1L NS.     Patient to be admitted to BMT service for further evaluation and management.    Oncologic history:      Patient with L neck growth 3/2018, which was initially evaluated by his local PCP and treated with an antibiotic. After no improvement they had done an excisional biopsy of his L neck adenopathy that revealed peripheral T-cell lymphoma. This was followed by surgical removal of his disease as it appeared to be confined to his neck. PET scan in 6/2018 normal save for a LN that had disappeared after first cycle of EPOCH therapy. There was rapid growth of L cervical LAD confirmed by PET after C4, and patient has since completed 5 courses of EPOCH prior to being seen at [facility]. He is currently involved in a study and received his first cycle of pembrolizumab and romidepsin this past Monday 11/12/2018.         Oncology Treatment Plan:   OP [id number]    Medications:  Continuous Infusions:  Scheduled Meds:   [START ON 11/16/2018] allopurinol  300 mg Oral Daily    [START ON 11/16/2018] gabapentin  100 mg Oral TID    piperacillin-tazobactam (ZOSYN) IVPB  4.5 g Intravenous Q8H    [START ON 11/16/2018] vancomycin (VANCOCIN) IVPB  15 mg/kg Intravenous Q12H     PRN Meds:dextrose 50%, dextrose 50%, glucagon (human recombinant), glucose, glucose, ondansetron, oxyCODONE-acetaminophen, sodium chloride 0.9%     Review of patient's allergies indicates:  No Known Allergies     Past Medical History:   Diagnosis Date     Cancer     Hearing loss     Peripheral neuropathy 6/19/2018    Pre-diabetes      Past Surgical History:   Procedure Laterality Date    DISSECTION OF NECK Left 5/30/2018    Procedure: DISSECTION, NECK;  Surgeon: Regan King MD;  Location: [facility] OR [address] [place] [address];  Service: ENT;  Laterality: Left;    DISSECTION, NECK Left 5/30/2018    Performed by Regan King MD at [facility] OR [address] [place] [address]    EXCISION OF PAROTID GLAND Left 5/30/2018    Procedure: EXCISION, PAROTID GLAND;  Surgeon: Regan King MD;  Location: [facility] OR [address] [place] [address];  Service: ENT;  Laterality: Left;    EXCISION, PAROTID GLAND Left 5/30/2018    Performed by Regan King MD at [facility] OR [address] [place] [address]    KNEE SURGERY Right      Family History     None        Tobacco Use    Smoking status: Former Smoker     Types: Cigars    Smokeless tobacco: Never Used    Tobacco comment: 1 cigar a week   Substance and Sexual Activity    Alcohol use: Yes     Alcohol/week: 0.6 oz     Types: 1 Shots of liquor per week     Comment: 2 per month    Drug use: No    Sexual activity: Not on file       Review of Systems   Constitutional: Positive for fatigue. Negative for chills and fever.   HENT:        Dysphagia   Respiratory: Negative for cough and shortness of breath.    Gastrointestinal: Negative for abdominal pain, constipation, diarrhea and nausea.   Endocrine: Negative for polydipsia, polyphagia and polyuria.   Genitourinary: Negative for dysuria, flank pain, hematuria and urgency.   Musculoskeletal: Negative for arthralgias, neck pain and neck stiffness.   Skin: Positive for color change, rash and wound.        Yellow discharge, pain on L side of face.   Neurological: Positive for weakness. Negative for seizures and headaches.   Psychiatric/Behavioral: Negative for agitation and confusion.                      Objective:     Vital Signs (Most Recent):  Temp: 98.6 °F (37 °C) (11/15/18 1705)  Pulse: 81 (11/15/18 2201)  Resp: (!) 9 (11/15/18 2201)  BP: (!)  163/88 (11/15/18 2201)  SpO2: (!) 94 % (11/15/18 2202) Vital Signs (24h Range):  Temp:  [98.6 °F (37 °C)] 98.6 °F (37 °C)  Pulse:  [] 81  Resp:  [9-20] 9  SpO2:  [92 %-99 %] 94 %  BP: (140-163)/(85-95) 163/88     Weight: 98.9 kg (218 lb)  Body mass index is 27.99 kg/m².  Body surface area is 2.27 meters squared.      Intake/Output Summary (Last 24 hours) at 11/15/2018 2239  Last data filed at 11/15/2018 2038  Gross per 24 hour   Intake 350 ml   Output --   Net 350 ml       Physical Exam   Constitutional: He is oriented to person, place, and time. He appears well-developed and well-nourished. No distress.   HENT:   Head: Normocephalic and atraumatic.   Lips are swollen (L>>R) Left buccal soft tissue is swollen, firm. No masses palpated. Oral Tongue mobile, normal in size. Hard Palate WNL   Eyes: EOM are normal. Pupils are equal, round, and reactive to light.   Neck:   L swelling with crusting and bulla present.   Cardiovascular: Normal rate, regular rhythm, normal heart sounds and intact distal pulses.   No murmur heard.  Pulmonary/Chest: Effort normal and breath sounds normal. No respiratory distress. He has no wheezes.   Abdominal: Soft. Bowel sounds are normal.   Musculoskeletal: Normal range of motion. He exhibits no edema, tenderness or deformity.   Neurological: He is alert and oriented to person, place, and time. No cranial nerve deficit. Coordination normal.   Skin: Skin is warm and dry. Capillary refill takes less than 2 seconds. No rash noted. He is not diaphoretic. No erythema.   Red, swollen skin on L side of face with crusting and bulla present   Psychiatric: He has a normal mood and affect. His behavior is normal. Thought content normal.   Vitals reviewed.      Significant Labs:   CBC:   Recent Labs   Lab 11/15/18  1734   WBC 3.30*   HGB 12.5*   HCT 39.6*       and CMP:   Recent Labs   Lab 11/15/18  1734      K 4.0      CO2 23   *   BUN 5*   CREATININE 1.0   CALCIUM 9.1    PROT 6.4   ALBUMIN 3.2*   BILITOT 0.8   ALKPHOS 98   AST 33   ALT 17   ANIONGAP 11   EGFRNONAA >60.0       Diagnostic Results:  I have reviewed and interpreted all pertinent imaging results/findings within the past 24 hours.     Imaging Results          CT Chest With Contrast (Final result)  Result time 11/15/18 20:55:46   Procedure changed from CTA Chest Non Coronary     Final result by Esau Coulter MD (11/15/18 20:55:46)                 Impression:      New bilateral axillary adenopathy, left side greater than right, with surrounding inflammatory fat stranding.  Findings could represent recurrent malignancy or infectious process.  Recommend correlation with clinical findings and continued follow-up.    Soft tissue thickening and inflammatory fat stranding in the lower neck and upper chest.  Left internal jugular vein is not definitively opacified.  Consider evaluation with ultrasound if there is concern for left upper extremity venous thrombus.  Please refer to same day CT neck for additional findings.    No pulmonary parenchymal disease or mediastinal adenopathy.    Electronically signed by resident: Sameer Chaudhari  Date:    11/15/2018  Time:    20:10    Electronically signed by: Esau Coulter MD  Date:    11/15/2018  Time:    20:55             Narrative:    EXAMINATION:  CT CHEST WITH CONTRAST    CLINICAL HISTORY:  Patient with large amount of swelling and erythema on face and chest;    TECHNIQUE:  Low dose axial images, sagittal and coronal reformations were obtained from the thoracic inlet to the lung bases following the IV administration of 100 mL of Omnipaque 350.    COMPARISON:  Nuclear medicine PET-CT 10/11/2018.  CT chest, 05/29/2018.    FINDINGS:  Base of Neck: Please refer to dedicated neck CT for findings above the thoracic inlet.    Thoracic soft tissues: Soft tissue thickening and fat stranding throughout the subcutaneous soft tissues at the superior portion of the chest.  There is a  centrally hypodense 2.3 cm left axillary lymph node in the area of prior concern for residual disease with surrounding fat stranding.  2.1 cm right axillary lymph node, new from prior PET-CT.  Multiple additional prominent left axillary lymph nodes are also new when compared with the prior PET-CT.  Of note, the left internal jugular vein is not definitively opacified.    Aorta: Left-sided aortic arch.  No aneurysm and no significant atherosclerosis    Heart: Normal size. No effusion.    Pulmonary vasculature: No large or central pulmonary embolus, noting that this exam was not tailored for evaluation of pulmonary emboli.    Melissa/Mediastinum: No hilar or mediastinal lymphadenopathy.  Cervical and supraclavicular adenopathy is better characterized on same day CT neck.    Airways: Patent.    Lungs/Pleura: Clear lungs. No pleural effusion or thickening.    Esophagus: Unremarkable.    Upper Abdomen: No acute finding.    Bones: No acute fracture. No suspicious lytic or sclerotic lesions.                               CT Soft Tissue Neck With Contrast (Final result)     Abnormal  Result time 11/15/18 21:38:30   Procedure changed from CTA Neck     Final result by Noel Valencia MD (11/15/18 21:38:30)                 Impression:      Diffuse abnormal soft tissue attenuation throughout the left anterior neck and face regions, significantly increased since prior, containing at least 3 hypoattenuating component suggestive of a necrotic tissue, as detailed above.  Developing abscess/myonecrosis or tumor recurrence remain in the differential diagnosis.  Suggest clinical correlation and short-term follow-up.    Mild narrowing of the hypopharyngeal airway secondary to mass effect from soft tissue involvement left parapharyngeal regions.  Continued short-term follow-up is suggested.    Small amount of retropharyngeal effusion.  This is most likely reactive to the vascular compression in the left aspect of the neck.  Consider  short-term follow-up.    Again, left internal jugular vein is not seen possibly thrombosed or surgically absent.    Additional findings as above.    This report was flagged in Epic as abnormal.    Electronically signed by resident: Juan Kinney  Date:    11/15/2018  Time:    20:21    Electronically signed by: Noel Valencia MD  Date:    11/15/2018  Time:    21:38             Narrative:    EXAMINATION:  CT SOFT TISSUE NECK WITH CONTRAST    CLINICAL HISTORY:  Patient with large swelling and erythema on face and neck;    TECHNIQUE:  Low dose axial images, sagittal and coronal reformations were performed from the skull base to the level of the clavicles.  Contrast was not administered.    COMPARISON:  CT chest 11/15/2018.  CTA neck 10/30/2018.  CT soft tissue neck 05/18/2018.    FINDINGS:  Visualized portion of the intracranial compartment is unremarkable.  Orbital and intraorbital content are unremarkable.  Minimal mucosal thickening in the right maxillary sinus and partial opacification of the left mastoid air cells.  Otherwise, remaining paranasal sinuses are essentially clear.    Postsurgical changes in the left face from prior left parotid gland resection and left neck dissection.  The right parotid and submandibular glands are unremarkable.  The gland is unremarkable.    Again identified diffuse abnormal soft tissue attenuation throughout the left neck and face which appear significantly larger than prior containing measuring 17 cm in CC dimension with more soft tissue attenuation with interval development of 3 new hypoattenuating internal component with no peripheral enhancement.  The largest 2 are in the left anterior neck measuring 3.8 x 3.1 cm and 2.3 x 3.0 cm.  The second hypoattenuating lesion is in the left anterior face measuring 1.8 x 1.4 cm, these are better seen on coronal reformat.    There are multiple enlarged lymph nodes throughout the cervical region.    The pharynx and larynx are unremarkable.   "There is mild narrowing of the hypopharyngeal airway asymmetric to the left secondary to soft tissue/tumor involvement..  There is a small amount of retropharyngeal effusion.  Please see dedicated report of the chest for further evaluation of the chest.  There is the left-sided PICC line terminating in the SVC.    Vasculature: Common carotid, internal and external carotid arteries are patent. Right internal jugular is patent. Left jugular vein is partially visualized and likely thrombosed.    Osseous structure are unremarkable.                                  Assessment/Plan:     * Left facial swelling    -patient presents with skin changes, hoarseness, difficulty swallowing. He is in no distress, satting well, normal work of breathing.   -DDx include L sided facial cellulitis vs allergy to immunotherapy (Keytruda) vs effects of malignancy  -patients VS are stable, no significant WBC.   -patient given Vanc and Zosyn in the ED; will continue  -BCX x2 pending, culture of the fluid drained from site pending as well  -initial lactic acid was elevated, and patient received 1L of NaCl in the ED now with repeat lactic acid pending  -CT scan showed mild airway narrowing and small retro pharyndeal effusion  -ENT consulted, conducted bedside laryngoscope and saw L side vocal cord paralysis  -ENT consulted, and on board with patient's care  -General surgery consult placed, please call in the AM  -will hold anticoagulation and NPO, given surgery eval in the AM   -will have low threshold for calling the ICU for airway patency, if patient develops SOB or stridor or any signs that he cannot manage secretions   -will have percocet and zofran PRN for pain and nausea control     Dysphagia    -most likely 2/2 to L vocal cord paralysis, compounded by mass effect from swelling and retro pharngeal effusion  -will make patient NPO for surgery eval  -MBSS in the AM      Acute bullous dermatitis    -please see "left facial swelling" for " assessment and plan     Type 2 diabetes mellitus without complication, with long-term current use of insulin    -patient takes metformin at home  -last A1c on previous admit was 5.0; well controlled  -will hold in favor of LDSSI   -will monitor insulin requirements for next 24 hours and add long acting coverage if necessary, but appears to be very well controlled and may not require much insulin.        T-cell lymphoma    - follows with Dr. Rao, last seen 10/15  -Pt was being treated for peripheral T-cell lymphoma by .  At the time of diagnosis, it appeared that all of the disease was confined to his neck and most of it was surgically removed.  A PET scan in June 2018 was normal except for a lymph node just to the  left of midline in the upper neck with an SUV max of 18.2.  That node was palpable, but it disappeared after the first course of therapy.  His pretreatment LDH was normal at 175.   -He has completed five courses of dose adjusted EPOCH.  The doses of cyclophosphamide and etoposide have been increased twice based on neutrophil counts and platelet counts.  Adriamycin not incrased because higher than normal risk of future cardiac disease.  Also, the dosage of vincristine was decreased slightly because of diabetes mellitus and numbness in his feet.  The numbness in the feet has not increased.   - He is planning on going to [facility] for first dose of Keytruda and Romidepsin on 11/13.          Tamara Topete MD  Hematology/Oncology  [facility]

## 2018-11-16 NOTE — PROGRESS NOTES
[facility]  Otorhinolaryngology-Head & Neck Surgery  Progress Note    Subjective:     Post-Op Info:  * No surgery found *      Hospital Day: 2     Interval History: NAEON.      Medications:  Continuous Infusions:  Scheduled Meds:   allopurinol  300 mg Oral Daily    gabapentin  100 mg Oral TID    piperacillin-tazobactam (ZOSYN) IVPB  4.5 g Intravenous Q8H    vancomycin (VANCOCIN) IVPB  15 mg/kg Intravenous Q12H     PRN Meds:dextrose 50%, dextrose 50%, glucagon (human recombinant), glucose, glucose, influenza, morphine, ondansetron, sodium chloride 0.9%     Review of patient's allergies indicates:  No Known Allergies  Objective:     Vital Signs (24h Range):  Temp:  [98.2 °F (36.8 °C)-98.9 °F (37.2 °C)] 98.2 °F (36.8 °C)  Pulse:  [] 96  Resp:  [9-20] 16  SpO2:  [92 %-99 %] 96 %  BP: (122-163)/(85-95) 122/85        Lines/Drains/Airways     Peripherally Inserted Central Catheter Line                 PICC Double Lumen 06/22/18 1107 left basilic 146 days                Physical Exam    Constitutional:  NAD.  Head/Face: Marked left sided facial swelling with overlying skin changes extending from the cheek/ear to the neck. Copious yellow crusting. Some bullae. Non-tender to palpation. Mildly erythematous.  Oral Cavity: Lips are swollen. Left buccal soft tissue is swollen, firm. Non-tender. FOM Soft, no masses palpated. Oral Tongue mobile, normal in size. Hard Palate WNL.   Neck/Lymphatic: diffuse left sided neck swelling with crusting and bullae, erythema which extends to the left chest  Face:Significant left sided facial swelling and soft tissue cellulitis. Some decreased muscle movmenet  Neuro/Psychiatric: AOx3.  Normal mood and affect.   Respiratory: Normal respiratory effort, no stridor, no retractions noted.  Voice: Rough, intelligible speech but hoarse.       Significant Labs:  ABGs: No results for input(s): PH, PCO2, HCO3, POCSATURATED, BE in the last 168 hours.  BMP:   Recent Labs   Lab  11/16/18  0358   *      CO2 26   BUN 5*   CREATININE 0.9   CALCIUM 9.0   MG 2.0     Cardiac Markers: No results for input(s): CKMB, TROPONINT, MYOGLOBIN in the last 168 hours.  CBC:   Recent Labs   Lab 11/16/18 0358   WBC 2.50*   RBC 3.99*   HGB 12.0*   HCT 38.0*   *   MCV 95   MCH 30.1   MCHC 31.6*     CMP:   Recent Labs   Lab 11/16/18 0358   *   CALCIUM 9.0   ALBUMIN 3.1*   PROT 6.0      K 3.7   CO2 26      BUN 5*   CREATININE 0.9   ALKPHOS 90   ALT 15   AST 29   BILITOT 0.7     Coagulation: No results for input(s): LABPROT, INR, APTT in the last 168 hours.  CRP: No results for input(s): CRP in the last 168 hours.  ESR: No results for input(s): ERYTHROCYTES in the last 168 hours.  LDH: No results for input(s): LDH in the last 168 hours.  LFTs:   Recent Labs   Lab 11/16/18 0358   ALT 15   AST 29   ALKPHOS 90   BILITOT 0.7   PROT 6.0   ALBUMIN 3.1*       Significant Diagnostics:  CT scan reviewed    Assessment/Plan:     * Left facial swelling    64 yo male with T cell lymphoma (started Chemo at [facility] last week) presents with skin changes, hoarseness, difficulty swallowing. Normal work of breathing, but left true vocal fold appears to be paralyzed. Airway currently patent.   Fluid/inflammation likely represents tumor necrosis following initiation of recent chemo.    -Agree with treatment per Primary and HemOnc  -Agree with broad spectrum abx  -Lymphoma is not an operative disease  -Would be very hesitant to perform I&D of fluid within tumor bed   Any attempts would inevitably lead to non healing wound/fistula for life   Continue abx for now   -L TVC  Motion impairment, airway is currently patent, but will watch closely  -recommend MBSS to further evaluate dysphagia  -Will follow closely  -please call ENT with any questions or concerns       T-cell lymphoma    Care per Hem/Onc         Tyson Grider MD  Otorhinolaryngology-Head & Neck Surgery  [facility]

## 2018-11-16 NOTE — HPI
62 yo m, h/o DM, L neck growth 3/2018, which was initially evaluated by his local PCP and treated with an antibiotic. After no improvement they had done an excisional biopsy of his L neck adenopathy that revealed peripheral T-cell lymphoma. This was followed by surgical removal of his disease as it appeared to be confined to his neck. PET scan in 6/2018 normal save for a LN that had disappeared after first cycle of EPOCH therapy. There was rapid growth of L cervical LAD confirmed by PET after C4, and patient has since completed 5 courses of EPOCH prior to being seen at [facility], first visit there on 10/22.  Admitted here 10/30 for worsening L neck swelling, started on steroids w small improvement.  Followed up at [facility] and received his first cycle of pembrolizumab and romidepsin this past Monday 11/12/2018.    He was admitted to the BMT team for significant worsening of his L neck swelling.  CT done on 11/15 showed diffuse abnormal soft tissue attenuation throughout the left anterior neck and face regions, significantly increased since prior, containing at least 3 hypoattenuating component suggestive of a necrotic tissue, as detailed above.  Developing abscess/myonecrosis or tumor recurrence remain in the differential diagnosis.  ENT scoped pt in the ED, has left vocal cord paralysis but airway patent, 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.   There recommendation was for speech and swallow eval and consideration of elective trach should sx progress.      Palliative care consulted for symptom management

## 2018-11-16 NOTE — ASSESSMENT & PLAN NOTE
-most likely 2/2 to L vocal cord paralysis, compounded by mass effect from swelling and retro pharngeal effusion  -will make patient NPO for surgery eval  -MBSS in the AM

## 2018-11-16 NOTE — ASSESSMENT & PLAN NOTE
- follows with Dr. Rao, last seen 10/15  -Pt was being treated for peripheral T-cell lymphoma by .  At the time of diagnosis, it appeared that all of the disease was confined to his neck and most of it was surgically removed.  A PET scan in June 2018 was normal except for a lymph node just to the  left of midline in the upper neck with an SUV max of 18.2.  That node was palpable, but it disappeared after the first course of therapy.  His pretreatment LDH was normal at 175.   -He has completed five courses of dose adjusted EPOCH.  The doses of cyclophosphamide and etoposide have been increased twice based on neutrophil counts and platelet counts.  Adriamycin not incrased because higher than normal risk of future cardiac disease.  Also, the dosage of vincristine was decreased slightly because of diabetes mellitus and numbness in his feet.  The numbness in the feet has not increased.   - He is planning on going to [facility] for first dose of Keytruda and Romidepsin on 11/13.

## 2018-11-16 NOTE — SUBJECTIVE & OBJECTIVE
"Medications:  Continuous Infusions:  Scheduled Meds:   vancomycin (VANCOCIN) IVPB  15 mg/kg Intravenous ED 1 Time     PRN Meds:     Current Facility-Administered Medications on File Prior to Encounter   Medication    heparin, porcine (PF) 100 unit/mL injection flush 500 Units    sodium chloride 0.9% flush 10 mL     Current Outpatient Medications on File Prior to Encounter   Medication Sig    allopurinol (ZYLOPRIM) 300 MG tablet Take 300 mg by mouth once daily.    blood sugar diagnostic Strp To check BG 4 times daily, to use with insurance preferred meter    blood-glucose meter kit To check BG 4 times daily, to use with insurance preferred meter    gabapentin (NEURONTIN) 100 MG capsule Take 100 mg by mouth 3 (three) times daily.    lancets Misc To check BG 4 times daily, to use with insurance preferred meter    metFORMIN (GLUCOPHAGE) 500 MG tablet Take 2 tablets (1,000 mg total) by mouth 2 (two) times daily with meals.    ondansetron (ZOFRAN) 8 MG tablet Take 1 tablet (8 mg total) by mouth every 8 (eight) hours as needed for Nausea.    oxyCODONE-acetaminophen (PERCOCET) 5-325 mg per tablet 1 or 2 tablets every 6 hours as needed for pain    pen needle, diabetic (BD ULTRA-FINE CORI PEN NEEDLE) 32 gauge x 5/32" Ndle To use 3 time daily with insulin    senna (SENOKOT) 8.6 mg tablet Take 1 tablet by mouth once daily.    tapentadol (NUCYNTA) 50 mg Tab Take by mouth.       Review of patient's allergies indicates:  No Known Allergies    Past Medical History:   Diagnosis Date    Cancer     Hearing loss     Peripheral neuropathy 6/19/2018    Pre-diabetes      Past Surgical History:   Procedure Laterality Date    DISSECTION OF NECK Left 5/30/2018    Procedure: DISSECTION, NECK;  Surgeon: Regan King MD;  Location: [facility] OR [address] [place] [address];  Service: ENT;  Laterality: Left;    DISSECTION, NECK Left 5/30/2018    Performed by Regan King MD at [facility] OR [address] [place] [address]    EXCISION OF PAROTID GLAND Left " 5/30/2018    Procedure: EXCISION, PAROTID GLAND;  Surgeon: Regan King MD;  Location: [facility] OR [address] [place] [address];  Service: ENT;  Laterality: Left;    EXCISION, PAROTID GLAND Left 5/30/2018    Performed by Regan King MD at [facility] OR [address] [place] [address]    KNEE SURGERY Right      Family History     None        Tobacco Use    Smoking status: Former Smoker     Types: Cigars    Smokeless tobacco: Never Used    Tobacco comment: 1 cigar a week   Substance and Sexual Activity    Alcohol use: Yes     Alcohol/week: 0.6 oz     Types: 1 Shots of liquor per week     Comment: 2 per month    Drug use: No    Sexual activity: Not on file     Review of Systems   Constitutional: Negative for chills and fever.   HENT: Positive for voice change. Negative for ear discharge.    Eyes: Negative for visual disturbance.   Respiratory: Negative for shortness of breath and stridor.    Cardiovascular: Negative for chest pain.   Gastrointestinal: Negative for abdominal pain.   Genitourinary: Negative for difficulty urinating.   Skin: Positive for rash.     Objective:     Vital Signs (Most Recent):  Temp: 98.6 °F (37 °C) (11/15/18 1705)  Pulse: 105 (11/15/18 1741)  Resp: 19 (11/15/18 1741)  BP: (!) 140/95 (11/15/18 1741)  SpO2: 96 % (11/15/18 1741) Vital Signs (24h Range):  Temp:  [98.6 °F (37 °C)] 98.6 °F (37 °C)  Pulse:  [105-114] 105  Resp:  [19-20] 19  SpO2:  [96 %-99 %] 96 %  BP: (140-154)/(95) 140/95     Weight: 98.9 kg (218 lb)  Body mass index is 27.99 kg/m².         Physical Exam   Constitutional: Sitting in bed, NAD.  Eyes: EOM I Bilaterally  Head/Face: Marked left sided facial swelling with overlying skin changes extending from the cheek/ear to the neck. Copious yellow crusting. Some bullae. Non-tender to palpation. Mildly erythematous.  Right Ear: EAC is stenotic, unable to visualize TM 2/2 swelling  Left Ear: External Auditory Canal WNL,TM w/o masses/lesions/perforations. Auricle WNL.  Nose: No gross nasal septal deviation.  Inferior Turbinates 2+ bilaterally. No septal perforation. No masses/lesions. External nasal skin without masses/lesions.  Oral Cavity: Lips are swollen. Left buccal soft tissue is swollen, firm. Non-tender. FOM Soft, no masses palpated. Oral Tongue mobile, normal in size. Hard Palate WNL.   Oropharynx: BOT WNL. No masses/lesions noted. Tonsillar fossa/pharyngeal wall without lesions. Posterior oropharynx WNL.  Soft palate without masses. Midline uvula.   Neck/Lymphatic: diffuse left sided neck swelling with crusting and bullae, erythema which extends to the left chest  Neuro/Psychiatric: AOx3.  Normal mood and affect.   Respiratory: Normal respiratory effort, no stridor, no retractions noted.  Voice:     Flexible Fiberoptic Laryngoscopy   Nasopharynx - the torus is clear. There are no lesions of the posterior wall.   Oropharynx - no lesions of the tongue base. There is no obvious fullness or asymmetry.  Hypopharynx - there are no lesions of the pyriform sinuses or postcricoid region    Larynx - Left true vocal fold appears to be paralyzed. Right vocal fold has no motor deficits. Airway appears patent.    Significant Labs:  All pertinent labs from the last 24 hours have been reviewed.    Significant Diagnostics:  I have reviewed and interpreted all pertinent imaging results/findings within the past 24 hours.

## 2018-11-16 NOTE — ASSESSMENT & PLAN NOTE
64 yo male with T cell lymphoma (started Chemo at [facility] last week) presents with skin changes, hoarseness, difficulty swallowing. Normal work of breathing, but left true vocal fold appears to be paralyzed. Airway currently patent.   Fluid/inflammation likely represents tumor necrosis following initiation of recent chemo.    -Agree with treatment per Primary and HemOnc  -Agree with broad spectrum abx  -Lymphoma is not an operative disease  -Would be very hesitant to perform I&D of fluid within tumor bed   Any attempts would inevitably lead to non healing wound/fistula for life   Continue abx for now   -L TVC  Motion impairment, airway is currently patent, but will watch closely  -recommend MBSS to further evaluate dysphagia  -Will follow closely  -please call ENT with any questions or concerns

## 2018-11-16 NOTE — ASSESSMENT & PLAN NOTE
-patient presents with skin changes, hoarseness, difficulty swallowing. He is in no distress, satting well, normal work of breathing.   -DDx include L sided facial cellulitis vs allergy to immunotherapy (Keytruda) vs effects of malignancy  -patients VS are stable, no significant WBC.   -patient given Vanc and Zosyn in the ED; will continue  -BCX x2 pending, culture of the fluid drained from site pending as well  -initial lactic acid was elevated, and patient received 1L of NaCl in the ED now with repeat lactic acid pending  -CT scan showed mild airway narrowing and small retro pharyndeal effusion  -ENT consulted, conducted bedside laryngoscope and saw L side vocal cord paralysis  -ENT consulted, and on board with patient's care  -General surgery consult placed, please call in the AM  -will hold anticoagulation and NPO, given surgery eval in the AM   -will have low threshold for calling the ICU for airway patency, if patient develops SOB or stridor or any signs that he cannot manage secretions   -will have percocet and zofran PRN for pain and nausea control

## 2018-11-16 NOTE — HPI
Mr. Mcpherson is a 63M with peripheral T-cell lymphoma (last seen by Dr. Rao, patient of Dr. Reids, previously on EPOCH, currently receiving keytruda and Romidepsin treatment at [facility]), DM2 on metformin here for swelling and blistering of his face.     These symptoms started worsening 2.5 weeks ago. Patient was admitted from 10/30-11/1 for progressively worsening L-sided neck and facial plethora. CT head revealed no acute intracranial abnormality. CTA neck demonstrated a large infiltrating soft tissue mass involving the left face and neck as above, previously hypermetabolic on PET/CT and compatible with malignancy. Multiple prominent right submental and right internal jugular chain lymph nodes. He was started on oral steroids and patient stated he noted mild improvement of the swelling. Patient counseled on importance of receiving chemotherapy to treat the malignancy and swelling. He made the decision to f/u and receive chemotherapy at [facility]. He was discharged on oral prednisone and an outpatient appointment was made to f/u w/ Dr. Rao in clinic. He was advised on signs and symptoms to monitor which if present should prompt a visit to the ED including worsening facial swelling or airway compromise    Patient noted that his facial swelling got worse and developed blisters after receiving his therapy on Monday at [facility] - he is currently involved in a trial there. He has had L facial swelling prior to starting the trial, but there has been progressive inward swelling into his oral cavity, resulting in difficulty swallowing and sometimes difficulty breathing as well. There is also skin changes involving blistering and oozing. He denies any pain, fever, chills. There is also swelling on his L chest with accompanying erythema, but with no pain.     CT scan in the ED shows mild narrowing of his airway and small retro-pharyndeal effusion. ENT was consulted in the ED, no not feel that there is airway  compromise.  They did find, however, that L vocal cord shows signs of paralysis. Blood and wound cultures placed. Lactic acid at 3.7, patient received 1L NS.     Patient to be admitted to BMT service for further evaluation and management.    Oncologic history:      Patient with L neck growth 3/2018, which was initially evaluated by his local PCP and treated with an antibiotic. After no improvement they had done an excisional biopsy of his L neck adenopathy that revealed peripheral T-cell lymphoma. This was followed by surgical removal of his disease as it appeared to be confined to his neck. PET scan in 6/2018 normal save for a LN that had disappeared after first cycle of EPOCH therapy. There was rapid growth of L cervical LAD confirmed by PET after C4, and patient has since completed 5 courses of EPOCH prior to being seen at [facility]. He is currently involved in a study and received his first cycle of pembrolizumab and romidepsin this past Monday 11/12/2018.

## 2018-11-16 NOTE — PLAN OF CARE
Problem: Patient Care Overview  Goal: Plan of Care Review  Outcome: Ongoing (interventions implemented as appropriate)  POC reviewed with patient; understanding verbalized. Bedside swallow study done. Wound care ordered. Pt remains independent. Tele and  in place. Pt voids clear, yellow urine; no noted BM this shift. Vanc and Zosyn administered as ordered. Wife to remain at bedside. Pt. with nonskid footwear on, bed in lowest position, and locked with bed rails up x2.  Pt. has call light and personal items within reach. VSS and afebrile this shift. All questions and concerns addressed at this time. Will continue to monitor.

## 2018-11-16 NOTE — HPI
Mr. Mcpherson is a 64 yo gentleman with a history of T-cell lymphoma treated with chemo at [facility] starting this week who presents to the ED for skin changes over the left side of his neck. He noticed this a couple of days ago after starting chemo on Monday. He denies pain to the area, but notes that he has had some yellow drainage from some of the blisters. His face/neck has been swollen for several weeks. He denies any progression of his swelling. He denies difficulty breathing at this point in time. He notes increased difficulty in swallowing solids over the past week, and often coughs when he tries to swallow. He has also been hoarse for a similar time period. No fevers, chills.

## 2018-11-16 NOTE — CONSULTS
[facility] [address] [facility]  Palliative Medicine  Consult Note    Patient Name: Arjun Mcpherson Jr.  MRN: [id number]  Admission Date: 11/15/2018  Hospital Length of Stay: 1 days  Code Status: Full Code   Attending Provider: Brodie Rao MD  Consulting Provider: Giselle Artis MD  Primary Care Physician: Jason Santo MD  Principal Problem:Left facial swelling    Patient information was obtained from patient and spouse/SO.      Consults  Assessment/Plan:     Palliative care encounter    64 yo m, advanced T cell lymphoma w progression of disease through chemo, currently enrolled in trial, with worsening left neck mass expansion and concern for future airway compromise    Decision-making:   -Pt does have decision-making capacity  -Pt has appointed a HCPOA: his wife Mylene Mcpherson    Advance care planning:  -The patient has previously engaged in advance care planning  -A living will and HCPOA are scanned into Epic.  Pt does not want any LST should he have a terminal, irreversible condition, per living will    Estimated prognosis:   -Some degree of prognostic uncertainty pending pt's response to current chemo regimen    Disease understanding:  -Patient/NOK demonstrate a good understanding of his current medical condition  -pt seems very realistic about his likely poor prognosis    Goals of care:  -Most important goals at this time are curative/life-prolongation  -pt does have a living will and it says that he would not want any LST in the setting a terminal/irreversible condition  -Code status: full code    Active symptoms related to advanced illness include:    Neck pain 2/2 large tumor burden  -started on neurontin and nucynta this week by pain management at [facility], pt was only using 2 nucynta 50 mg/day for breakthrough pain  -minimal pain since being admitted, has only received 1 dose 0.5 mg dilaudid  -reports that pain often comes in short, brief episodes and subsides spontaneously  -for now,  "would leave PRN opiates (morphine fine though can transition back to his nucynta when able to take PO)    Airway obstruction  -pt with significant worries about choking and airway obstruction  -we briefly discussed elective trach - presume that pt will f/u with ENT as outpatient to discuss further - but anticipate that pt will likely need this procedure given rapid POD    Based on the above, my recommendations include  -referral to home-based palliative care vs Dr. Kalli Shepherd for outpatient palliative care clinic (only on Fridays) upon discharge    Discussion with pt/wife  -checked in with pt about how things were going and he said "I'm tired" repeatedly  -he expressed his frustration w his disease progression, his worry that things may continue to worsen and what this means  -in terms of sx, said biggest worry is that he will choke and he increasingly feels like he's going to choke.  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 I will follow-up with patient. Please contact us if you have any additional questions.    Subjective: "     HPI:   64 yo m, h/o DM, L neck growth 3/2018, which was initially evaluated by his local PCP and treated with an antibiotic. After no improvement they had done an excisional biopsy of his L neck adenopathy that revealed peripheral T-cell lymphoma. This was followed by surgical removal of his disease as it appeared to be confined to his neck. PET scan in 6/2018 normal save for a LN that had disappeared after first cycle of EPOCH therapy. There was rapid growth of L cervical LAD confirmed by PET after C4, and patient has since completed 5 courses of EPOCH prior to being seen at [facility], first visit there on 10/22.  Admitted here 10/30 for worsening L neck swelling, started on steroids w small improvement.  Followed up at [facility] and received his first cycle of pembrolizumab and romidepsin this past Monday 11/12/2018.    He was admitted to the BMT team for significant worsening of his L neck swelling.  CT done on 11/15 showed diffuse abnormal soft tissue attenuation throughout the left anterior neck and face regions, significantly increased since prior, containing at least 3 hypoattenuating component suggestive of a necrotic tissue, as detailed above.  Developing abscess/myonecrosis or tumor recurrence remain in the differential diagnosis.  ENT scoped pt in the ED, has left vocal cord paralysis but airway patent, 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.   There recommendation was for speech and swallow eval and consideration of elective trach should sx progress.      Palliative care consulted for symptom management      Hospital Course:  No notes on file      Past Medical History:   Diagnosis Date    Cancer     Hearing loss     Peripheral neuropathy 6/19/2018    Pre-diabetes        Past Surgical History:   Procedure Laterality Date    DISSECTION OF NECK Left 5/30/2018    Procedure: DISSECTION, NECK;  Surgeon: Regan King MD;  Location: [facility] OR [address] [place] [address];  Service:  ENT;  Laterality: Left;    DISSECTION, NECK Left 5/30/2018    Performed by Regan King MD at [facility] OR [address] [place] [address]    EXCISION OF PAROTID GLAND Left 5/30/2018    Procedure: EXCISION, PAROTID GLAND;  Surgeon: Regan King MD;  Location: [facility] OR [address] [place] [address];  Service: ENT;  Laterality: Left;    EXCISION, PAROTID GLAND Left 5/30/2018    Performed by Regan King MD at [facility] OR [address] [place] [address]    KNEE SURGERY Right        Review of patient's allergies indicates:  No Known Allergies    Medications:  Continuous Infusions:  Scheduled Meds:   allopurinol  300 mg Oral Daily    enoxaparin  40 mg Subcutaneous Daily    gabapentin  100 mg Oral TID    piperacillin-tazobactam (ZOSYN) IVPB  4.5 g Intravenous Q8H    vancomycin (VANCOCIN) IVPB  15 mg/kg Intravenous Q12H    white petrolatum   Topical (Top) BID     PRN Meds:dextrose 50%, dextrose 50%, glucagon (human recombinant), glucose, glucose, influenza, morphine, ondansetron, sodium chloride 0.9%    Family History     None        Tobacco Use    Smoking status: Former Smoker     Types: Cigars    Smokeless tobacco: Never Used    Tobacco comment: 1 cigar a week   Substance and Sexual Activity    Alcohol use: Yes     Alcohol/week: 0.6 oz     Types: 1 Shots of liquor per week     Comment: 2 per month    Drug use: No    Sexual activity: Not on file       Review of Systems   Constitutional: Positive for fatigue. Negative for chills and fever.   HENT: Positive for facial swelling.    Respiratory: Positive for choking. Negative for shortness of breath.    Cardiovascular: Negative for chest pain.   Gastrointestinal: Negative for constipation and nausea.   Psychiatric/Behavioral: Positive for dysphoric mood. The patient is nervous/anxious.    All other systems reviewed and are negative.    Objective:     Vital Signs (Most Recent):  Temp: 98.4 °F (36.9 °C) (11/16/18 1545)  Pulse: (!) 116 (11/16/18 1545)  Resp: 18 (11/16/18 1545)  BP: (!) 155/87 (11/16/18 1545)  SpO2: 95  % (11/16/18 1545) Vital Signs (24h Range):  Temp:  [97.9 °F (36.6 °C)-98.9 °F (37.2 °C)] 98.4 °F (36.9 °C)  Pulse:  [] 116  Resp:  [9-20] 18  SpO2:  [92 %-99 %] 95 %  BP: (122-163)/(85-95) 155/87     Weight: 97.9 kg (215 lb 13.3 oz)  Body mass index is 27.71 kg/m².    Review of Symptoms  Bowel Management Plan (BMP): No    ECOG Performance Status Grade: 2 - Ambulates, capable of self care only    Physical Exam   Constitutional: Vital signs are normal. He is cooperative.   HENT:   Large necrotic mass to left neck and face    No stridor/drooling on my assessment   Pulmonary/Chest: No respiratory distress.   Neurological: He is alert.   Nursing note and vitals reviewed.      CBC:   Recent Labs   Lab 11/16/18  0358   WBC 2.50*   HGB 12.0*   HCT 38.0*   MCV 95   *     BMP:  Recent Labs   Lab 11/16/18  0358   *      K 3.7      CO2 26   BUN 5*   CREATININE 0.9   CALCIUM 9.0   MG 2.0     LFT:  Lab Results   Component Value Date    AST 29 11/16/2018    ALKPHOS 90 11/16/2018    BILITOT 0.7 11/16/2018     Albumin:   Albumin   Date Value Ref Range Status   11/16/2018 3.1 (L) 3.5 - 5.2 g/dL Final     Protein:   Total Protein   Date Value Ref Range Status   11/16/2018 6.0 6.0 - 8.4 g/dL Final     Lactic acid:   Lab Results   Component Value Date    LACTATE 2.3 (H) 11/15/2018    LACTATE 3.7 (HH) 11/15/2018       Significant Imaging: CT: I have reviewed all pertinent results/findings within the past 24 hours:  neck mass    Advanced Directives::  Living Will: Yes. Copy on chart: Yes - patient wishes that all LST be withheld if he has an incurable/irreversible condition  LaPOST: No  Do Not Resuscitate Status: No  Medical Power of : Yes. Agent's Name: Mylene Mcpherson.     Decision-Making Capacity: Patient answered questions, Family answered questions    Living Arrangements: Lives with spouse      > 50% of 60 min visit spent in chart review, face to face discussion of goals of care,  symptom  assessment, coordination of care and emotional support.    Giselle Artis MD  Palliative Medicine  [facility]  [phone]

## 2018-11-16 NOTE — SUBJECTIVE & OBJECTIVE
Interval History: NAEON.      Medications:  Continuous Infusions:  Scheduled Meds:   allopurinol  300 mg Oral Daily    gabapentin  100 mg Oral TID    piperacillin-tazobactam (ZOSYN) IVPB  4.5 g Intravenous Q8H    vancomycin (VANCOCIN) IVPB  15 mg/kg Intravenous Q12H     PRN Meds:dextrose 50%, dextrose 50%, glucagon (human recombinant), glucose, glucose, influenza, morphine, ondansetron, sodium chloride 0.9%     Review of patient's allergies indicates:  No Known Allergies  Objective:     Vital Signs (24h Range):  Temp:  [98.2 °F (36.8 °C)-98.9 °F (37.2 °C)] 98.2 °F (36.8 °C)  Pulse:  [] 96  Resp:  [9-20] 16  SpO2:  [92 %-99 %] 96 %  BP: (122-163)/(85-95) 122/85        Lines/Drains/Airways     Peripherally Inserted Central Catheter Line                 PICC Double Lumen 06/22/18 1107 left basilic 146 days                Physical Exam    Constitutional:  NAD.  Head/Face: Marked left sided facial swelling with overlying skin changes extending from the cheek/ear to the neck. Copious yellow crusting. Some bullae. Non-tender to palpation. Mildly erythematous.  Oral Cavity: Lips are swollen. Left buccal soft tissue is swollen, firm. Non-tender. FOM Soft, no masses palpated. Oral Tongue mobile, normal in size. Hard Palate WNL.   Neck/Lymphatic: diffuse left sided neck swelling with crusting and bullae, erythema which extends to the left chest  Face:Significant left sided facial swelling and soft tissue cellulitis. Some decreased muscle movmenet  Neuro/Psychiatric: AOx3.  Normal mood and affect.   Respiratory: Normal respiratory effort, no stridor, no retractions noted.  Voice: Rough, intelligible speech but hoarse.       Significant Labs:  ABGs: No results for input(s): PH, PCO2, HCO3, POCSATURATED, BE in the last 168 hours.  BMP:   Recent Labs   Lab 11/16/18  0358   *      CO2 26   BUN 5*   CREATININE 0.9   CALCIUM 9.0   MG 2.0     Cardiac Markers: No results for input(s): CKMB, TROPONINT, MYOGLOBIN  in the last 168 hours.  CBC:   Recent Labs   Lab 11/16/18 0358   WBC 2.50*   RBC 3.99*   HGB 12.0*   HCT 38.0*   *   MCV 95   MCH 30.1   MCHC 31.6*     CMP:   Recent Labs   Lab 11/16/18 0358   *   CALCIUM 9.0   ALBUMIN 3.1*   PROT 6.0      K 3.7   CO2 26      BUN 5*   CREATININE 0.9   ALKPHOS 90   ALT 15   AST 29   BILITOT 0.7     Coagulation: No results for input(s): LABPROT, INR, APTT in the last 168 hours.  CRP: No results for input(s): CRP in the last 168 hours.  ESR: No results for input(s): ERYTHROCYTES in the last 168 hours.  LDH: No results for input(s): LDH in the last 168 hours.  LFTs:   Recent Labs   Lab 11/16/18 0358   ALT 15   AST 29   ALKPHOS 90   BILITOT 0.7   PROT 6.0   ALBUMIN 3.1*       Significant Diagnostics:  CT scan reviewed

## 2018-11-16 NOTE — PROGRESS NOTES
[facility]  Otorhinolaryngology-Head & Neck Surgery  Progress Note    Subjective:     Post-Op Info:  * No surgery found *      Hospital Day: 2     Interval History: NAEON.      Medications:  Continuous Infusions:  Scheduled Meds:   allopurinol  300 mg Oral Daily    gabapentin  100 mg Oral TID    piperacillin-tazobactam (ZOSYN) IVPB  4.5 g Intravenous Q8H    vancomycin (VANCOCIN) IVPB  15 mg/kg Intravenous Q12H     PRN Meds:dextrose 50%, dextrose 50%, glucagon (human recombinant), glucose, glucose, influenza, morphine, ondansetron, sodium chloride 0.9%     Review of patient's allergies indicates:  No Known Allergies  Objective:     Vital Signs (24h Range):  Temp:  [98.2 °F (36.8 °C)-98.9 °F (37.2 °C)] 98.2 °F (36.8 °C)  Pulse:  [] 96  Resp:  [9-20] 16  SpO2:  [92 %-99 %] 96 %  BP: (122-163)/(85-95) 122/85        Lines/Drains/Airways     Peripherally Inserted Central Catheter Line                 PICC Double Lumen 06/22/18 1107 left basilic 146 days                Physical Exam    Constitutional:  NAD.  Head/Face: Marked left sided facial swelling with overlying skin changes extending from the cheek/ear to the neck. Copious yellow crusting. Some bullae. Non-tender to palpation. Mildly erythematous.  Oral Cavity: Lips are swollen. Left buccal soft tissue is swollen, firm. Non-tender. FOM Soft, no masses palpated. Oral Tongue mobile, normal in size. Hard Palate WNL.   Neck/Lymphatic: diffuse left sided neck swelling with crusting and bullae, erythema which extends to the left chest  Neuro/Psychiatric: AOx3.  Normal mood and affect.   Respiratory: Normal respiratory effort, no stridor, no retractions noted.  Voice: Rough, intelligible speech but hoarse.       Significant Labs:  ABGs: No results for input(s): PH, PCO2, HCO3, POCSATURATED, BE in the last 168 hours.  BMP:   Recent Labs   Lab 11/16/18  0358   *      CO2 26   BUN 5*   CREATININE 0.9   CALCIUM 9.0   MG 2.0     Cardiac  Markers: No results for input(s): CKMB, TROPONINT, MYOGLOBIN in the last 168 hours.  CBC:   Recent Labs   Lab 11/16/18  0358   WBC 2.50*   RBC 3.99*   HGB 12.0*   HCT 38.0*   *   MCV 95   MCH 30.1   MCHC 31.6*     CMP:   Recent Labs   Lab 11/16/18  0358   *   CALCIUM 9.0   ALBUMIN 3.1*   PROT 6.0      K 3.7   CO2 26      BUN 5*   CREATININE 0.9   ALKPHOS 90   ALT 15   AST 29   BILITOT 0.7     Coagulation: No results for input(s): LABPROT, INR, APTT in the last 168 hours.  CRP: No results for input(s): CRP in the last 168 hours.  ESR: No results for input(s): ERYTHROCYTES in the last 168 hours.  LDH: No results for input(s): LDH in the last 168 hours.  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 last week) presents with skin changes, hoarseness, difficulty swallowing. Normal work of breathing, but left true vocal fold appears to be paralyzed. Airway currently patent.   Fluid/inflammation likely represents tumor necrosis following initiation of recent chemo.    -Agree with treatment per Primary and HemOnc  -Agree with broad spectrum abx  -Would be very hesitant to perform I&D of fluid within tumor bed   Any attempts would inevitably lead to non healing wound/fistula for life  -Airway is currently patent, but will watch closely.  -recommend MBSS to further evaluate dysphagia  -Will follow closely  -please call ENT with any questions or concerns           Tyson Grider MD  Otorhinolaryngology-Head & Neck Surgery  [facility]

## 2018-11-16 NOTE — ASSESSMENT & PLAN NOTE
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 He is in no distress, satting well, normal work of breathing. His airway is patent. His left true vocal fold appears to be paralyzed, which is likely contributing to his dysphagia. The etiology of this is unclear at this time, but could be related to his tumor on the left side, and possibly the chemotherapeutic agents he was started on at [facility]. There is concern for cellulitis over the left side of face/neck/chest.    -f/u CT  -recommend consult to heme/onc, possibly ICU in the setting of possible infection in an immunocompromised state  -recommend MBSS to further evaluate dysphagia  -airway is patent at this time. If patient experienced difficulty breathing, he could be started on nasal cannula, non-rebreather, CPAP, BIPAP, and could be intubated from above if necessary  -case discussed with Dr. Faulkner  -please call ENT with any questions or concerns

## 2018-11-16 NOTE — PLAN OF CARE
MDR's with Dr Rao.  Patient was recently admitted under obs and d/c on 11/1 due to neck swelling.  After d/c patient rec'd chemo at [facility].  After chemo the patient's face swelling worsened and started draining.  ENT consulted to r/o airway compromise.  Gen sx and wound care also consulted.  Cultures in process and IV abx started.  D/c not anticipated this weekend.  CM will continue to follow for d/c needs.

## 2018-11-16 NOTE — ASSESSMENT & PLAN NOTE
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  Pt does not want any LST should he have a terminal, irreversible condition, per living will    Estimated prognosis:   -Some degree of prognostic uncertainty pending pt's response to current chemo regimen    Disease understanding:  -Patient/NOK demonstrate a good understanding of his current medical condition  -pt seems very realistic about his likely poor prognosis    Goals of care:  -Most important goals at this time are curative/life-prolongation  -pt does have a living will and it says that he would not want any LST in the setting a terminal/irreversible condition  -Code status: full code    Active symptoms related to advanced illness include:    Neck pain 2/2 large tumor burden  -started on neurontin and nucynta this week by pain management at [facility], pt was only using 2 nucynta 50 mg/day for breakthrough pain  -minimal pain since being admitted, has only received 1 dose 0.5 mg dilaudid  -reports that pain often comes in short, brief episodes and subsides spontaneously  -for now, would leave PRN opiates (morphine fine though can transition back to his nucynta when able to take PO)    Airway obstruction  -pt with significant worries about choking and airway obstruction  -we briefly discussed elective trach - presume that pt will f/u with ENT as outpatient to discuss further - but anticipate that pt will likely need this procedure given rapid POD    Based on the above, my recommendations include  -referral to home-based palliative care vs Dr. Kalli Shepherd for outpatient  "palliative care clinic (only on Fridays) upon discharge    Discussion with pt/wife  -checked in with pt about how things were going and he said "I'm tired" repeatedly  -he expressed his frustration w his disease progression, his worry that things may continue to worsen and what this means  -in terms of sx, said biggest worry is that he will choke and he increasingly feels like he's going to choke.  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

## 2018-11-16 NOTE — SUBJECTIVE & OBJECTIVE
Past Medical History:   Diagnosis Date    Cancer     Hearing loss     Peripheral neuropathy 6/19/2018    Pre-diabetes        Past Surgical History:   Procedure Laterality Date    DISSECTION OF NECK Left 5/30/2018    Procedure: DISSECTION, NECK;  Surgeon: Regan King MD;  Location: [facility] OR [address] [place] [address];  Service: ENT;  Laterality: Left;    DISSECTION, NECK Left 5/30/2018    Performed by Regan King MD at [facility] OR [address] [place] [address]    EXCISION OF PAROTID GLAND Left 5/30/2018    Procedure: EXCISION, PAROTID GLAND;  Surgeon: Regan King MD;  Location: [facility] OR [address] [place] [address];  Service: ENT;  Laterality: Left;    EXCISION, PAROTID GLAND Left 5/30/2018    Performed by Regan King MD at [facility] OR [address] [place] [address]    KNEE SURGERY Right        Review of patient's allergies indicates:  No Known Allergies    Medications:  Continuous Infusions:  Scheduled Meds:   allopurinol  300 mg Oral Daily    enoxaparin  40 mg Subcutaneous Daily    gabapentin  100 mg Oral TID    piperacillin-tazobactam (ZOSYN) IVPB  4.5 g Intravenous Q8H    vancomycin (VANCOCIN) IVPB  15 mg/kg Intravenous Q12H    white petrolatum   Topical (Top) BID     PRN Meds:dextrose 50%, dextrose 50%, glucagon (human recombinant), glucose, glucose, influenza, morphine, ondansetron, sodium chloride 0.9%    Family History     None        Tobacco Use    Smoking status: Former Smoker     Types: Cigars    Smokeless tobacco: Never Used    Tobacco comment: 1 cigar a week   Substance and Sexual Activity    Alcohol use: Yes     Alcohol/week: 0.6 oz     Types: 1 Shots of liquor per week     Comment: 2 per month    Drug use: No    Sexual activity: Not on file       Review of Systems   Constitutional: Positive for fatigue. Negative for chills and fever.   HENT: Positive for facial swelling.    Respiratory: Positive for choking. Negative for shortness of breath.    Cardiovascular: Negative for chest pain.   Gastrointestinal: Negative for constipation and  nausea.   Psychiatric/Behavioral: Positive for dysphoric mood. The patient is nervous/anxious.    All other systems reviewed and are negative.    Objective:     Vital Signs (Most Recent):  Temp: 98.4 °F (36.9 °C) (11/16/18 1545)  Pulse: (!) 116 (11/16/18 1545)  Resp: 18 (11/16/18 1545)  BP: (!) 155/87 (11/16/18 1545)  SpO2: 95 % (11/16/18 1545) Vital Signs (24h Range):  Temp:  [97.9 °F (36.6 °C)-98.9 °F (37.2 °C)] 98.4 °F (36.9 °C)  Pulse:  [] 116  Resp:  [9-20] 18  SpO2:  [92 %-99 %] 95 %  BP: (122-163)/(85-95) 155/87     Weight: 97.9 kg (215 lb 13.3 oz)  Body mass index is 27.71 kg/m².    Review of Symptoms  Bowel Management Plan (BMP): No    ECOG Performance Status Grade: 2 - Ambulates, capable of self care only    Physical Exam   Constitutional: Vital signs are normal. He is cooperative.   HENT:   Large necrotic mass to left neck and face    No stridor/drooling on my assessment   Pulmonary/Chest: No respiratory distress.   Neurological: He is alert.   Nursing note and vitals reviewed.      CBC:   Recent Labs   Lab 11/16/18  0358   WBC 2.50*   HGB 12.0*   HCT 38.0*   MCV 95   *     BMP:  Recent Labs   Lab 11/16/18  0358   *      K 3.7      CO2 26   BUN 5*   CREATININE 0.9   CALCIUM 9.0   MG 2.0     LFT:  Lab Results   Component Value Date    AST 29 11/16/2018    ALKPHOS 90 11/16/2018    BILITOT 0.7 11/16/2018     Albumin:   Albumin   Date Value Ref Range Status   11/16/2018 3.1 (L) 3.5 - 5.2 g/dL Final     Protein:   Total Protein   Date Value Ref Range Status   11/16/2018 6.0 6.0 - 8.4 g/dL Final     Lactic acid:   Lab Results   Component Value Date    LACTATE 2.3 (H) 11/15/2018    LACTATE 3.7 (HH) 11/15/2018       Significant Imaging: CT: I have reviewed all pertinent results/findings within the past 24 hours:  neck mass    Advanced Directives::  Living Will: Yes. Copy on chart: Yes - patient wishes that all LST be withheld if he has an incurable/irreversible condition  LaPOST:  No  Do Not Resuscitate Status: No  Medical Power of : Yes. Agent's Name: Mylene Mcpherson.     Decision-Making Capacity: Patient answered questions, Family answered questions    Living Arrangements: Lives with spouse

## 2018-11-16 NOTE — PT/OT/SLP EVAL
Occupational Therapy   Evaluation and Discharge Note    Name: Arjun Mcpherson Jr.  MRN: [id number]  Admitting Diagnosis:  Left facial swelling      Recommendations:     Discharge Recommendations: home  Discharge Equipment Recommendations:  none  Barriers to discharge:  None    History:     Occupational Profile:  Living Environment: Pt lives with wife in 1SH with 0STE; bathroom contains walk-in shower with no DME  Previous level of function: PTA, pt independent with ADL and functional mobility   Roles and Routines: , drives, does not work  Equipment Owned:  none  Assistance upon Discharge: Pt will have assistance from wife and sister that live near by    Past Medical History:   Diagnosis Date    Cancer     Hearing loss     Peripheral neuropathy 6/19/2018    Pre-diabetes        Past Surgical History:   Procedure Laterality Date    DISSECTION OF NECK Left 5/30/2018    Procedure: DISSECTION, NECK;  Surgeon: Regan King MD;  Location: [facility] OR [address] [place] [address];  Service: ENT;  Laterality: Left;    DISSECTION, NECK Left 5/30/2018    Performed by Regan King MD at [facility] OR [address] [place] [address]    EXCISION OF PAROTID GLAND Left 5/30/2018    Procedure: EXCISION, PAROTID GLAND;  Surgeon: Regan King MD;  Location: [facility] OR [address] [place] [address];  Service: ENT;  Laterality: Left;    EXCISION, PAROTID GLAND Left 5/30/2018    Performed by Regan King MD at [facility] OR [address] [place] [address]    KNEE SURGERY Right        Subjective     Chief Complaint: No complaints  Patient/Family stated goals: Return home  Communicated with: RN prior to session.  Pain/Comfort:  · Pain Rating 1: 0/10  · Pain Rating Post-Intervention 1: 0/10    Patients cultural, spiritual, Congregation conflicts given the current situation:  None    Objective:     Patient found with: peripheral IV, telemetry    General Precautions: Standard,     Orthopedic Precautions:N/A   Braces: N/A     Occupational Performance:    Bed Mobility:    · Patient completed  Rolling/Turning to Left with  independence  · Patient completed Supine to Sit with independence  · Patient completed Sit to Supine with independence    Functional Mobility/Transfers:  · Patient completed Sit <> Stand Transfer with indpendence  with  no assistive device   · Functional Mobility: Pt completed functional mobility in hallway ( house hold distance) with supervision and no AD.     Activities of Daily Living:  · Upper Body Dressing: independence to miriam gown like jacket while standing  · Lower Body Dressing: independence to miriam B socks while seated EOB    Cognitive/Visual Perceptual:  Cognitive/Psychosocial Skills:     -       Oriented to: Person, Place, Time and Situation   -       Follows Commands/attention:Follows multistep  commands  -       Communication: clear/fluent  -       Safety awareness/insight to disability: intact   -       Mood/Affect/Coping skills/emotional control: Appropriate to situation  Visual/Perceptual:      -Intact     Physical Exam:  Postural examination/scapula alignment:    -       Rounded shoulders  Skin integrity: Dry and crusted L side of face  Edema:  Moderate L side of face/neck  Dominant hand:    -       RUE  Upper Extremity Range of Motion:     -       Right Upper Extremity: WFL  -       Left Upper Extremity: WFL  Upper Extremity Strength:    -       Right Upper Extremity: WFL 5/5  -       Left Upper Extremity: WFL  5/5 shoulder upward elevation limited 2/2 swelling    Strength:    -       Right Upper Extremity: WFL  -       Left Upper Extremity: WFL    Patient left supine with all lines intact, call button in reach and RN notified    [facility] 6 Click:  AMPA Total Score: 24    Treatment & Education:  -Pt edu on OT role/POC  -Importance of OOB activity with staff assistance// walk in hallways with family 3x per day  -Safety during functional t/f and mobility  - White board updated  - Multiple self care tasks completed-- assistance level noted above  - All  "questions/concerns answered within OT scope of practice  - Edu pt on BUE exercises to complete throughout the day to prevent stiffness in shoulders 2/2 edema   Education:    Assessment:     Arjun Mcpherson Jr. is a 63 y.o. male with a medical diagnosis of Left facial swelling. At this time, patient is functioning at their prior level of function and does not require further acute OT services.     Clinical Decision Makin.  OT Low:  "Pt evaluation falls under low complexity for evaluation coding due to performance deficits noted in 1-3 areas as stated above and 0 co-morbities affecting current functional status. Data obtained from problem focused assessments. No modifications or assistance was required for completion of evaluation. Only brief occupational profile and history review completed."     Plan:     During this hospitalization, patient does not require further acute OT services.  Please re-consult if situation changes.    · Plan of Care Reviewed with: patient    This Plan of care has been discussed with the patient who was involved in its development and understands and is in agreement with the identified goals and treatment plan    GOALS:   Multidisciplinary Problems     Occupational Therapy Goals        Problem: Occupational Therapy Goal    Goal Priority Disciplines Outcome Interventions   Occupational Therapy Goal     OT, PT/OT Unable to achieve outcome(s) by discharge    Description:  Pt is currently performing ADLs, functional mobility & t/fs at baseline and displays age-appropriate strength, endurance & balance. OT services are not recommended at this time and patient is safe to d/c home with support of wife with no DME needs.                       Time Tracking:     OT Date of Treatment: 18  OT Start Time: 855  OT Stop Time: 905  OT Total Time (min): 10 min    Billable Minutes:Evaluation 10    Harini chen OT  2018    "

## 2018-11-16 NOTE — SUBJECTIVE & OBJECTIVE
Oncology Treatment Plan:   OP R-GEMOX    Medications:  Continuous Infusions:  Scheduled Meds:   [START ON 11/16/2018] allopurinol  300 mg Oral Daily    [START ON 11/16/2018] gabapentin  100 mg Oral TID    piperacillin-tazobactam (ZOSYN) IVPB  4.5 g Intravenous Q8H    [START ON 11/16/2018] vancomycin (VANCOCIN) IVPB  15 mg/kg Intravenous Q12H     PRN Meds:dextrose 50%, dextrose 50%, glucagon (human recombinant), glucose, glucose, ondansetron, oxyCODONE-acetaminophen, sodium chloride 0.9%     Review of patient's allergies indicates:  No Known Allergies     Past Medical History:   Diagnosis Date    Cancer     Hearing loss     Peripheral neuropathy 6/19/2018    Pre-diabetes      Past Surgical History:   Procedure Laterality Date    DISSECTION OF NECK Left 5/30/2018    Procedure: DISSECTION, NECK;  Surgeon: Regan King MD;  Location: [facility] OR [address] [place] [address];  Service: ENT;  Laterality: Left;    DISSECTION, NECK Left 5/30/2018    Performed by Regan King MD at [facility] OR [address] [place] [address]    EXCISION OF PAROTID GLAND Left 5/30/2018    Procedure: EXCISION, PAROTID GLAND;  Surgeon: Regan King MD;  Location: [facility] OR [address] [place] [address];  Service: ENT;  Laterality: Left;    EXCISION, PAROTID GLAND Left 5/30/2018    Performed by Regan King MD at [facility] OR [address] [place] [address]    KNEE SURGERY Right      Family History     None        Tobacco Use    Smoking status: Former Smoker     Types: Cigars    Smokeless tobacco: Never Used    Tobacco comment: 1 cigar a week   Substance and Sexual Activity    Alcohol use: Yes     Alcohol/week: 0.6 oz     Types: 1 Shots of liquor per week     Comment: 2 per month    Drug use: No    Sexual activity: Not on file       Review of Systems   Constitutional: Positive for fatigue. Negative for chills and fever.   HENT:        Dysphagia   Respiratory: Negative for cough and shortness of breath.    Gastrointestinal: Negative for abdominal pain, constipation, diarrhea and nausea.   Endocrine:  Negative for polydipsia, polyphagia and polyuria.   Genitourinary: Negative for dysuria, flank pain, hematuria and urgency.   Musculoskeletal: Negative for arthralgias, neck pain and neck stiffness.   Skin: Positive for color change, rash and wound.        Yellow discharge, pain on L side of face.   Neurological: Positive for weakness. Negative for seizures and headaches.   Psychiatric/Behavioral: Negative for agitation and confusion.                      Objective:     Vital Signs (Most Recent):  Temp: 98.6 °F (37 °C) (11/15/18 1705)  Pulse: 81 (11/15/18 2201)  Resp: (!) 9 (11/15/18 2201)  BP: (!) 163/88 (11/15/18 2201)  SpO2: (!) 94 % (11/15/18 2202) Vital Signs (24h Range):  Temp:  [98.6 °F (37 °C)] 98.6 °F (37 °C)  Pulse:  [] 81  Resp:  [9-20] 9  SpO2:  [92 %-99 %] 94 %  BP: (140-163)/(85-95) 163/88     Weight: 98.9 kg (218 lb)  Body mass index is 27.99 kg/m².  Body surface area is 2.27 meters squared.      Intake/Output Summary (Last 24 hours) at 11/15/2018 2239  Last data filed at 11/15/2018 2038  Gross per 24 hour   Intake 350 ml   Output --   Net 350 ml       Physical Exam   Constitutional: He is oriented to person, place, and time. He appears well-developed and well-nourished. No distress.   HENT:   Head: Normocephalic and atraumatic.   Lips are swollen (L>>R) Left buccal soft tissue is swollen, firm. No masses palpated. Oral Tongue mobile, normal in size. Hard Palate WNL   Eyes: EOM are normal. Pupils are equal, round, and reactive to light.   Neck:   L swelling with crusting and bulla present.   Cardiovascular: Normal rate, regular rhythm, normal heart sounds and intact distal pulses.   No murmur heard.  Pulmonary/Chest: Effort normal and breath sounds normal. No respiratory distress. He has no wheezes.   Abdominal: Soft. Bowel sounds are normal.   Musculoskeletal: Normal range of motion. He exhibits no edema, tenderness or deformity.   Neurological: He is alert and oriented to person, place, and  time. No cranial nerve deficit. Coordination normal.   Skin: Skin is warm and dry. Capillary refill takes less than 2 seconds. No rash noted. He is not diaphoretic. No erythema.   Red, swollen skin on L side of face with crusting and bulla present   Psychiatric: He has a normal mood and affect. His behavior is normal. Thought content normal.   Vitals reviewed.      Significant Labs:   CBC:   Recent Labs   Lab 11/15/18  1734   WBC 3.30*   HGB 12.5*   HCT 39.6*       and CMP:   Recent Labs   Lab 11/15/18  1734      K 4.0      CO2 23   *   BUN 5*   CREATININE 1.0   CALCIUM 9.1   PROT 6.4   ALBUMIN 3.2*   BILITOT 0.8   ALKPHOS 98   AST 33   ALT 17   ANIONGAP 11   EGFRNONAA >60.0       Diagnostic Results:  I have reviewed and interpreted all pertinent imaging results/findings within the past 24 hours.     Imaging Results          CT Chest With Contrast (Final result)  Result time 11/15/18 20:55:46   Procedure changed from CTA Chest Non Coronary     Final result by Esau Coulter MD (11/15/18 20:55:46)                 Impression:      New bilateral axillary adenopathy, left side greater than right, with surrounding inflammatory fat stranding.  Findings could represent recurrent malignancy or infectious process.  Recommend correlation with clinical findings and continued follow-up.    Soft tissue thickening and inflammatory fat stranding in the lower neck and upper chest.  Left internal jugular vein is not definitively opacified.  Consider evaluation with ultrasound if there is concern for left upper extremity venous thrombus.  Please refer to same day CT neck for additional findings.    No pulmonary parenchymal disease or mediastinal adenopathy.    Electronically signed by resident: Sameer Chaudhari  Date:    11/15/2018  Time:    20:10    Electronically signed by: Esau Coulter MD  Date:    11/15/2018  Time:    20:55             Narrative:    EXAMINATION:  CT CHEST WITH CONTRAST    CLINICAL  HISTORY:  Patient with large amount of swelling and erythema on face and chest;    TECHNIQUE:  Low dose axial images, sagittal and coronal reformations were obtained from the thoracic inlet to the lung bases following the IV administration of 100 mL of Omnipaque 350.    COMPARISON:  Nuclear medicine PET-CT 10/11/2018.  CT chest, 05/29/2018.    FINDINGS:  Base of Neck: Please refer to dedicated neck CT for findings above the thoracic inlet.    Thoracic soft tissues: Soft tissue thickening and fat stranding throughout the subcutaneous soft tissues at the superior portion of the chest.  There is a centrally hypodense 2.3 cm left axillary lymph node in the area of prior concern for residual disease with surrounding fat stranding.  2.1 cm right axillary lymph node, new from prior PET-CT.  Multiple additional prominent left axillary lymph nodes are also new when compared with the prior PET-CT.  Of note, the left internal jugular vein is not definitively opacified.    Aorta: Left-sided aortic arch.  No aneurysm and no significant atherosclerosis    Heart: Normal size. No effusion.    Pulmonary vasculature: No large or central pulmonary embolus, noting that this exam was not tailored for evaluation of pulmonary emboli.    Melissa/Mediastinum: No hilar or mediastinal lymphadenopathy.  Cervical and supraclavicular adenopathy is better characterized on same day CT neck.    Airways: Patent.    Lungs/Pleura: Clear lungs. No pleural effusion or thickening.    Esophagus: Unremarkable.    Upper Abdomen: No acute finding.    Bones: No acute fracture. No suspicious lytic or sclerotic lesions.                               CT Soft Tissue Neck With Contrast (Final result)     Abnormal  Result time 11/15/18 21:38:30   Procedure changed from CTA Neck     Final result by Noel Valencia MD (11/15/18 21:38:30)                 Impression:      Diffuse abnormal soft tissue attenuation throughout the left anterior neck and face regions,  significantly increased since prior, containing at least 3 hypoattenuating component suggestive of a necrotic tissue, as detailed above.  Developing abscess/myonecrosis or tumor recurrence remain in the differential diagnosis.  Suggest clinical correlation and short-term follow-up.    Mild narrowing of the hypopharyngeal airway secondary to mass effect from soft tissue involvement left parapharyngeal regions.  Continued short-term follow-up is suggested.    Small amount of retropharyngeal effusion.  This is most likely reactive to the vascular compression in the left aspect of the neck.  Consider short-term follow-up.    Again, left internal jugular vein is not seen possibly thrombosed or surgically absent.    Additional findings as above.    This report was flagged in Epic as abnormal.    Electronically signed by resident: Juan Kinney  Date:    11/15/2018  Time:    20:21    Electronically signed by: Noel Valencia MD  Date:    11/15/2018  Time:    21:38             Narrative:    EXAMINATION:  CT SOFT TISSUE NECK WITH CONTRAST    CLINICAL HISTORY:  Patient with large swelling and erythema on face and neck;    TECHNIQUE:  Low dose axial images, sagittal and coronal reformations were performed from the skull base to the level of the clavicles.  Contrast was not administered.    COMPARISON:  CT chest 11/15/2018.  CTA neck 10/30/2018.  CT soft tissue neck 05/18/2018.    FINDINGS:  Visualized portion of the intracranial compartment is unremarkable.  Orbital and intraorbital content are unremarkable.  Minimal mucosal thickening in the right maxillary sinus and partial opacification of the left mastoid air cells.  Otherwise, remaining paranasal sinuses are essentially clear.    Postsurgical changes in the left face from prior left parotid gland resection and left neck dissection.  The right parotid and submandibular glands are unremarkable.  The gland is unremarkable.    Again identified diffuse abnormal soft tissue  attenuation throughout the left neck and face which appear significantly larger than prior containing measuring 17 cm in CC dimension with more soft tissue attenuation with interval development of 3 new hypoattenuating internal component with no peripheral enhancement.  The largest 2 are in the left anterior neck measuring 3.8 x 3.1 cm and 2.3 x 3.0 cm.  The second hypoattenuating lesion is in the left anterior face measuring 1.8 x 1.4 cm, these are better seen on coronal reformat.    There are multiple enlarged lymph nodes throughout the cervical region.    The pharynx and larynx are unremarkable.  There is mild narrowing of the hypopharyngeal airway asymmetric to the left secondary to soft tissue/tumor involvement..  There is a small amount of retropharyngeal effusion.  Please see dedicated report of the chest for further evaluation of the chest.  There is the left-sided PICC line terminating in the SVC.    Vasculature: Common carotid, internal and external carotid arteries are patent. Right internal jugular is patent. Left jugular vein is partially visualized and likely thrombosed.    Osseous structure are unremarkable.

## 2018-11-16 NOTE — PROGRESS NOTES
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  Presents with skin changes, hoarseness, difficulty swallowing. Currently receiving keytruda and Romidepsin , DM2 on metformin  Recommend cleansing blistered tissue with saline and twice daily application of Aquaphor ointment to affected area which can be left JULIAN.     11/16/18 1450       Wound 11/15/18 2330 Blister(s) Face   Date First Assessed/Time First Assessed: 11/15/18 2330   Pre-existing: Yes  Primary Wound Type: Blister(s)  Side: Left  Location: Face   Wound Image      Wound WDL ex   Dressing Appearance No dressing;Open to air;Dried drainage   Drainage Amount Small   Drainage Characteristics/Odor Tan   Appearance Blistered;Moist  (scattered open and closed blisters )   Tissue loss description Partial thickness   Periwound Area Redness;Swelling   Wound Length (cm) 10 cm   Wound Width (cm) 20 cm   Care Cleansed with:;Sterile normal saline     James Cool RN CWON  [id number]

## 2018-11-16 NOTE — NURSING
Pt arrived to the floor via stretcher from ED. Left sided facial swelling and redness with several blisters, some are intact and fluid filled, some have ruptured and are oozing yellow fluid. Swelling, redness, and blisters extend from pt's face to his neck and just redness on his chest. Facial asymmetry at admission. Lips are swollen with lesions. Airway appears patent. Sats are 92-98% on room air. Pt does report shortness of breath, but only when walking longer distances. Tele and continuous pulse ox monitoring initiated. Suction set up at the bedside. Pt is NPO- awaiting SLP eval. Will continue IV abx. Plan of care reviewed with pt and spouse.

## 2018-11-16 NOTE — PLAN OF CARE
Problem: Occupational Therapy Goal  Goal: Occupational Therapy Goal  Pt is currently performing ADLs, functional mobility & t/fs at baseline and displays age-appropriate strength, endurance & balance. OT services are not recommended at this time and patient is safe to d/c home with support of wife with no DME needs.     Outcome: Unable to achieve outcome(s) by discharge  Evaluation completed. Pt at functional baseline. D/c home with no OT needs.

## 2018-11-16 NOTE — PLAN OF CARE
Problem: SLP Goal  Goal: SLP Goal  Pt seen for clinical swallow assessment this date. Pt appearing safe to advance diet to nectar thickened liquids and purees solids. Speech to closely monitor.     Abril Kelly MS, CCC-SLP  Speech Language Pathologist  Pager: [phone]  Date 11/16/2018

## 2018-11-16 NOTE — CONSULTS
[facility]  Otorhinolaryngology-Head & Neck Surgery  Consult Note    Patient Name: Arjun Mcpherson Jr.  MRN: [id number]  Code Status: Prior  Admission Date: 11/15/2018  Hospital Length of Stay: 0 days  Attending Physician: Arely Carranza MD  Primary Care Provider: Jason Santo MD    Patient information was obtained from patient and ER records.     Inpatient consult to ENT  Consult performed by: Ryan Carmen Jr., MD  Consult ordered by: Prachi Liu MD        Subjective:     Chief Complaint/Reason for Admission: skin blisters    History of Present Illness: Mr. Mcpherson is a 62 yo gentleman with a history of T-cell lymphoma treated with chemo at [facility] starting this week who presents to the ED for skin changes over the left side of his neck. He noticed this a couple of days ago after starting chemo on Monday. He denies pain to the area, but notes that he has had some yellow drainage from some of the blisters. His face/neck has been swollen for several weeks. He denies any progression of his swelling. He denies difficulty breathing at this point in time. He notes increased difficulty in swallowing solids over the past week, and often coughs when he tries to swallow. He has also been hoarse for a similar time period. No fevers, chills.    Medications:  Continuous Infusions:  Scheduled Meds:   vancomycin (VANCOCIN) IVPB  15 mg/kg Intravenous ED 1 Time     PRN Meds:     Current Facility-Administered Medications on File Prior to Encounter   Medication    heparin, porcine (PF) 100 unit/mL injection flush 500 Units    sodium chloride 0.9% flush 10 mL     Current Outpatient Medications on File Prior to Encounter   Medication Sig    allopurinol (ZYLOPRIM) 300 MG tablet Take 300 mg by mouth once daily.    blood sugar diagnostic Strp To check BG 4 times daily, to use with insurance preferred meter    blood-glucose meter kit To check BG 4 times daily, to use with  "insurance preferred meter    gabapentin (NEURONTIN) 100 MG capsule Take 100 mg by mouth 3 (three) times daily.    lancets Misc To check BG 4 times daily, to use with insurance preferred meter    metFORMIN (GLUCOPHAGE) 500 MG tablet Take 2 tablets (1,000 mg total) by mouth 2 (two) times daily with meals.    ondansetron (ZOFRAN) 8 MG tablet Take 1 tablet (8 mg total) by mouth every 8 (eight) hours as needed for Nausea.    oxyCODONE-acetaminophen (PERCOCET) 5-325 mg per tablet 1 or 2 tablets every 6 hours as needed for pain    pen needle, diabetic (BD ULTRA-FINE CORI PEN NEEDLE) 32 gauge x 5/32" Ndle To use 3 time daily with insulin    senna (SENOKOT) 8.6 mg tablet Take 1 tablet by mouth once daily.    tapentadol (NUCYNTA) 50 mg Tab Take by mouth.       Review of patient's allergies indicates:  No Known Allergies    Past Medical History:   Diagnosis Date    Cancer     Hearing loss     Peripheral neuropathy 6/19/2018    Pre-diabetes      Past Surgical History:   Procedure Laterality Date    DISSECTION OF NECK Left 5/30/2018    Procedure: DISSECTION, NECK;  Surgeon: Regan King MD;  Location: [facility] OR [address] [place] [address];  Service: ENT;  Laterality: Left;    DISSECTION, NECK Left 5/30/2018    Performed by Regan King MD at [facility] OR [address] [place] [address]    EXCISION OF PAROTID GLAND Left 5/30/2018    Procedure: EXCISION, PAROTID GLAND;  Surgeon: Regan King MD;  Location: [facility] OR [address] [place] [address];  Service: ENT;  Laterality: Left;    EXCISION, PAROTID GLAND Left 5/30/2018    Performed by Regan King MD at [facility] OR [address] [place] [address]    KNEE SURGERY Right      Family History     None        Tobacco Use    Smoking status: Former Smoker     Types: Cigars    Smokeless tobacco: Never Used    Tobacco comment: 1 cigar a week   Substance and Sexual Activity    Alcohol use: Yes     Alcohol/week: 0.6 oz     Types: 1 Shots of liquor per week     Comment: 2 per month    Drug use: No    Sexual activity: Not on file "     Review of Systems   Constitutional: Negative for chills and fever.   HENT: Positive for voice change. Negative for ear discharge.    Eyes: Negative for visual disturbance.   Respiratory: Negative for shortness of breath and stridor.    Cardiovascular: Negative for chest pain.   Gastrointestinal: Negative for abdominal pain.   Genitourinary: Negative for difficulty urinating.   Skin: Positive for rash.     Objective:     Vital Signs (Most Recent):  Temp: 98.6 °F (37 °C) (11/15/18 1705)  Pulse: 105 (11/15/18 1741)  Resp: 19 (11/15/18 1741)  BP: (!) 140/95 (11/15/18 1741)  SpO2: 96 % (11/15/18 1741) Vital Signs (24h Range):  Temp:  [98.6 °F (37 °C)] 98.6 °F (37 °C)  Pulse:  [105-114] 105  Resp:  [19-20] 19  SpO2:  [96 %-99 %] 96 %  BP: (140-154)/(95) 140/95     Weight: 98.9 kg (218 lb)  Body mass index is 27.99 kg/m².         Physical Exam   Constitutional: Sitting in bed, NAD.  Eyes: EOM I Bilaterally  Head/Face: Marked left sided facial swelling with overlying skin changes extending from the cheek/ear to the neck. Copious yellow crusting. Some bullae. Non-tender to palpation. Mildly erythematous.  Right Ear: EAC is stenotic, unable to visualize TM 2/2 swelling  Left Ear: External Auditory Canal WNL,TM w/o masses/lesions/perforations. Auricle WNL.  Nose: No gross nasal septal deviation. Inferior Turbinates 2+ bilaterally. No septal perforation. No masses/lesions. External nasal skin without masses/lesions.  Oral Cavity: Lips are swollen. Left buccal soft tissue is swollen, firm. Non-tender. FOM Soft, no masses palpated. Oral Tongue mobile, normal in size. Hard Palate WNL.   Oropharynx: BOT WNL. No masses/lesions noted. Tonsillar fossa/pharyngeal wall without lesions. Posterior oropharynx WNL.  Soft palate without masses. Midline uvula.   Neck/Lymphatic: diffuse left sided neck swelling with crusting and bullae, erythema which extends to the left chest  Neuro/Psychiatric: AOx3.  Normal mood and affect.    Respiratory: Normal respiratory effort, no stridor, no retractions noted.  Voice:     Flexible Fiberoptic Laryngoscopy   Nasopharynx - the torus is clear. There are no lesions of the posterior wall.   Oropharynx - no lesions of the tongue base. There is no obvious fullness or asymmetry.  Hypopharynx - there are no lesions of the pyriform sinuses or postcricoid region    Larynx - Left true vocal fold appears to be paralyzed. Right vocal fold has no motor deficits. Airway appears patent.    Significant Labs:  All pertinent labs from the last 24 hours have been reviewed.    Significant Diagnostics:  I have reviewed and interpreted all pertinent imaging results/findings within the past 24 hours.    Assessment/Plan:     Left facial swelling    62 yo male with T cell lymphoma presents with skin changes, hoarseness, difficulty swallowing. He is in no distress, satting well, normal work of breathing. His airway is patent. His left true vocal fold appears to be paralyzed, which is likely contributing to his dysphagia. The etiology of this is unclear at this time, but could be related to his tumor on the left side, and possibly the chemotherapeutic agents he was started on at [facility]. There is concern for cellulitis over the left side of face/neck/chest.    -f/u CT  -recommend consult to heme/onc, possibly ICU in the setting of possible infection in an immunocompromised state  -recommend MBSS to further evaluate dysphagia  -airway is patent at this time. If patient experienced difficulty breathing, he could be started on nasal cannula, non-rebreather, CPAP, BIPAP, and could be intubated from above if necessary  -case discussed with Dr. Faulkner  -please call ENT with any questions or concerns         VTE Risk Mitigation (From admission, onward)    None          Thank you for your consult.     Ryan Carmen Jr., MD  Otorhinolaryngology-Head & Neck Surgery  [facility]

## 2018-11-16 NOTE — ASSESSMENT & PLAN NOTE
-patient takes metformin at home  -last A1c on previous admit was 5.0; well controlled  -will hold in favor of LDSSI   -will monitor insulin requirements for next 24 hours and add long acting coverage if necessary, but appears to be very well controlled and may not require much insulin.

## 2018-11-17 LAB
ALBUMIN SERPL BCP-MCNC: 3.1 G/DL
ALP SERPL-CCNC: 91 U/L
ALT SERPL W/O P-5'-P-CCNC: 17 U/L
ANION GAP SERPL CALC-SCNC: 11 MMOL/L
AST SERPL-CCNC: 33 U/L
BASOPHILS # BLD AUTO: 0.02 K/UL
BASOPHILS NFR BLD: 0.6 %
BILIRUB SERPL-MCNC: 0.7 MG/DL
BUN SERPL-MCNC: 5 MG/DL
CALCIUM SERPL-MCNC: 9 MG/DL
CHLORIDE SERPL-SCNC: 102 MMOL/L
CO2 SERPL-SCNC: 24 MMOL/L
CREAT SERPL-MCNC: 1.2 MG/DL
DIFFERENTIAL METHOD: ABNORMAL
EOSINOPHIL # BLD AUTO: 0.1 K/UL
EOSINOPHIL NFR BLD: 3.7 %
ERYTHROCYTE [DISTWIDTH] IN BLOOD BY AUTOMATED COUNT: 13.6 %
EST. GFR  (AFRICAN AMERICAN): >60 ML/MIN/1.73 M^2
EST. GFR  (NON AFRICAN AMERICAN): >60 ML/MIN/1.73 M^2
GLUCOSE SERPL-MCNC: 134 MG/DL
HCT VFR BLD AUTO: 38.1 %
HGB BLD-MCNC: 12.1 G/DL
IMM GRANULOCYTES # BLD AUTO: 0.01 K/UL
IMM GRANULOCYTES NFR BLD AUTO: 0.3 %
LYMPHOCYTES # BLD AUTO: 0.7 K/UL
LYMPHOCYTES NFR BLD: 19.1 %
MAGNESIUM SERPL-MCNC: 1.9 MG/DL
MCH RBC QN AUTO: 30.2 PG
MCHC RBC AUTO-ENTMCNC: 31.8 G/DL
MCV RBC AUTO: 95 FL
MONOCYTES # BLD AUTO: 0.7 K/UL
MONOCYTES NFR BLD: 21.1 %
NEUTROPHILS # BLD AUTO: 1.9 K/UL
NEUTROPHILS NFR BLD: 55.2 %
NRBC BLD-RTO: 0 /100 WBC
PHOSPHATE SERPL-MCNC: 3 MG/DL
PLATELET # BLD AUTO: 90 K/UL
PMV BLD AUTO: 9.2 FL
POTASSIUM SERPL-SCNC: 3.6 MMOL/L
PROT SERPL-MCNC: 6.1 G/DL
RBC # BLD AUTO: 4.01 M/UL
SODIUM SERPL-SCNC: 137 MMOL/L
VANCOMYCIN TROUGH SERPL-MCNC: 17.4 UG/ML
WBC # BLD AUTO: 3.51 K/UL

## 2018-11-17 PROCEDURE — 80053 COMPREHEN METABOLIC PANEL: CPT

## 2018-11-17 PROCEDURE — 63600175 PHARM REV CODE 636 W HCPCS: Performed by: INTERNAL MEDICINE

## 2018-11-17 PROCEDURE — 99233 SBSQ HOSP IP/OBS HIGH 50: CPT | Mod: ,,, | Performed by: INTERNAL MEDICINE

## 2018-11-17 PROCEDURE — 63600175 PHARM REV CODE 636 W HCPCS: Performed by: STUDENT IN AN ORGANIZED HEALTH CARE EDUCATION/TRAINING PROGRAM

## 2018-11-17 PROCEDURE — 84100 ASSAY OF PHOSPHORUS: CPT

## 2018-11-17 PROCEDURE — 85025 COMPLETE CBC W/AUTO DIFF WBC: CPT

## 2018-11-17 PROCEDURE — 25000003 PHARM REV CODE 250: Performed by: STUDENT IN AN ORGANIZED HEALTH CARE EDUCATION/TRAINING PROGRAM

## 2018-11-17 PROCEDURE — 80202 ASSAY OF VANCOMYCIN: CPT

## 2018-11-17 PROCEDURE — 25000003 PHARM REV CODE 250: Performed by: INTERNAL MEDICINE

## 2018-11-17 PROCEDURE — 83735 ASSAY OF MAGNESIUM: CPT

## 2018-11-17 PROCEDURE — 20600001 HC STEP DOWN PRIVATE ROOM

## 2018-11-17 RX ORDER — PROCHLORPERAZINE EDISYLATE 5 MG/ML
5 INJECTION INTRAMUSCULAR; INTRAVENOUS EVERY 6 HOURS PRN
Status: DISCONTINUED | OUTPATIENT
Start: 2018-11-17 | End: 2018-11-17

## 2018-11-17 RX ORDER — ONDANSETRON 2 MG/ML
4 INJECTION INTRAMUSCULAR; INTRAVENOUS ONCE
Status: COMPLETED | OUTPATIENT
Start: 2018-11-17 | End: 2018-11-17

## 2018-11-17 RX ORDER — ONDANSETRON 2 MG/ML
4 INJECTION INTRAMUSCULAR; INTRAVENOUS EVERY 6 HOURS PRN
Status: DISCONTINUED | OUTPATIENT
Start: 2018-11-17 | End: 2018-11-20 | Stop reason: HOSPADM

## 2018-11-17 RX ADMIN — PIPERACILLIN AND TAZOBACTAM 4.5 G: 4; .5 INJECTION, POWDER, LYOPHILIZED, FOR SOLUTION INTRAVENOUS; PARENTERAL at 10:11

## 2018-11-17 RX ADMIN — WHITE PETROLATUM: 1.75 OINTMENT TOPICAL at 09:11

## 2018-11-17 RX ADMIN — ONDANSETRON 4 MG: 2 INJECTION INTRAMUSCULAR; INTRAVENOUS at 10:11

## 2018-11-17 RX ADMIN — VANCOMYCIN HYDROCHLORIDE 1500 MG: 10 INJECTION, POWDER, LYOPHILIZED, FOR SOLUTION INTRAVENOUS at 06:11

## 2018-11-17 RX ADMIN — Medication 1 MG: at 06:11

## 2018-11-17 RX ADMIN — ONDANSETRON 4 MG: 2 INJECTION INTRAMUSCULAR; INTRAVENOUS at 09:11

## 2018-11-17 RX ADMIN — ONDANSETRON 8 MG: 8 TABLET, ORALLY DISINTEGRATING ORAL at 05:11

## 2018-11-17 RX ADMIN — PIPERACILLIN AND TAZOBACTAM 4.5 G: 4; .5 INJECTION, POWDER, LYOPHILIZED, FOR SOLUTION INTRAVENOUS; PARENTERAL at 05:11

## 2018-11-17 RX ADMIN — PIPERACILLIN AND TAZOBACTAM 4.5 G: 4; .5 INJECTION, POWDER, LYOPHILIZED, FOR SOLUTION INTRAVENOUS; PARENTERAL at 02:11

## 2018-11-17 RX ADMIN — WHITE PETROLATUM: 1.75 OINTMENT TOPICAL at 08:11

## 2018-11-17 RX ADMIN — GABAPENTIN 100 MG: 100 CAPSULE ORAL at 08:11

## 2018-11-17 RX ADMIN — SULFAMETHOXAZOLE AND TRIMETHOPRIM 40 ML: 200; 40 SUSPENSION ORAL at 09:11

## 2018-11-17 RX ADMIN — SULFAMETHOXAZOLE AND TRIMETHOPRIM 40 ML: 200; 40 SUSPENSION ORAL at 08:11

## 2018-11-17 RX ADMIN — GABAPENTIN 100 MG: 100 CAPSULE ORAL at 09:11

## 2018-11-17 RX ADMIN — ALLOPURINOL 300 MG: 300 TABLET ORAL at 08:11

## 2018-11-17 RX ADMIN — ENOXAPARIN SODIUM 40 MG: 100 INJECTION SUBCUTANEOUS at 04:11

## 2018-11-17 RX ADMIN — VANCOMYCIN HYDROCHLORIDE 1500 MG: 10 INJECTION, POWDER, LYOPHILIZED, FOR SOLUTION INTRAVENOUS at 05:11

## 2018-11-17 RX ADMIN — GABAPENTIN 100 MG: 100 CAPSULE ORAL at 04:11

## 2018-11-17 RX ADMIN — Medication 1 MG: at 05:11

## 2018-11-17 NOTE — HOSPITAL COURSE
11/17/2018 Remains on broad spectrum vanc/Zosyn/ Bactrim today, had nausea with vomiting requiring parenteral antiemetics. Patient and wife seen and counseled by Palliative yesterday, remains full code. Airway patent and stable.   11/18/2018 Patient with nausea vomiting controlled with PRNs, tumor in L cheek is now necrotic and weeping/ crusting. Continuing on reduced dose frequency vanc today for likely prerenal PHOENIX with poor PO intake. Checking urine lytes, Pink's stain, giving IVF. Discussing care coordination between [facility] and [facility].   11/19/2018 PHOENIX continues to worsen, nephrology consulted. FeNA indicating pre-renal PHOENIX, 1L NS bolus given again. US retroperitoneal showing cortical thinning and decreased perfusion bilaterally, consistent with medical renal disease  11/20/2018 Worsening PHOENIX and transaminitis, creat 3.1, ALT 57,  today. Peripheral blood smear ordered for LDH 1539 per neph recs. PT consulted for limb massage d/t pronounced lymph edema, compression stocking also ordered for arm. Continues on zosyn for tumor wound to face. Dr. Rao in contact with physician at [facility] for chemo dosing for patient, currently scheduled for tomorrow 11/21, transportation concern per family. Plan to discharge and wife to drive patient to [facility] tomorrow for appointment.

## 2018-11-17 NOTE — ASSESSMENT & PLAN NOTE
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 Full code

## 2018-11-17 NOTE — ASSESSMENT & PLAN NOTE
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 Normal work of breathing, but left true vocal fold appears to be paralyzed. Airway currently patent.   Fluid/inflammation likely represents tumor necrosis following initiation of recent chemo.    -Agree with treatment per Primary and HemOnc  -Agree with broad spectrum abx  -Lymphoma is not an operative disease  -Would be very hesitant to perform I&D of fluid within tumor bed   Any attempts would inevitably lead to non healing wound/fistula for life   Continue abx and wound care for now   -L TVC  Motion impairment, airway is currently patent, worsening tumor burden may lead to worsening respiratory status, monitor closely  -recommend MBSS to further evaluate dysphagia  -please call ENT with any questions or concerns

## 2018-11-17 NOTE — SUBJECTIVE & OBJECTIVE
Interval History: No acute events overnight. Patient reports that he has had no problems with swallowing or breathing. Was evaluated by Heme/Onc and Palliative care.    Medications:  Continuous Infusions:  Scheduled Meds:   allopurinol  300 mg Oral Daily    enoxaparin  40 mg Subcutaneous Daily    gabapentin  100 mg Oral TID    piperacillin-tazobactam (ZOSYN) IVPB  4.5 g Intravenous Q8H    sulfamethoxazole-trimethoprim 200-40 mg/5 ml  40 mL Oral Q12H    vancomycin (VANCOCIN) IVPB  15 mg/kg Intravenous Q12H    white petrolatum   Topical (Top) BID     PRN Meds:dextrose 50%, dextrose 50%, glucagon (human recombinant), glucose, glucose, HYDROmorphone, influenza, morphine, ondansetron, prochlorperazine, sodium chloride 0.9%     Review of patient's allergies indicates:  No Known Allergies  Objective:     Vital Signs (24h Range):  Temp:  [97.9 °F (36.6 °C)-99 °F (37.2 °C)] 98.5 °F (36.9 °C)  Pulse:  [] 100  Resp:  [16-20] 19  SpO2:  [95 %-99 %] 96 %  BP: (137-166)/() 137/82     Date 11/17/18 0700 - 11/18/18 0659   Shift 8126-4399 9150-9133 2177-7709 24 Hour Total   INTAKE   P.O. 120   120   Shift Total(mL/kg) 120(1.2)   120(1.2)   OUTPUT   Emesis/NG output 300   300   Shift Total(mL/kg) 300(3.1)   300(3.1)   Weight (kg) 97.9 97.9 97.9 97.9     Lines/Drains/Airways     Peripherally Inserted Central Catheter Line                 PICC Double Lumen 06/22/18 1107 left basilic 148 days                Physical Exam  Constitutional:  NAD.  Head/Face: Marked left sided facial swelling with overlying skin changes extending from the cheek/ear to the neck. Copious yellow crusting. Some bullae. Non-tender to palpation. Mildly erythematous.  Oral Cavity: Lips are swollen. Left buccal soft tissue is swollen, firm. Non-tender. FOM Soft, no masses palpated. Oral Tongue mobile, normal in size. Hard Palate WNL.   Neck/Lymphatic: diffuse left sided neck swelling with crusting and bullae, erythema which extends to the left  chest  Face:Significant left sided facial swelling and soft tissue cellulitis. Some decreased muscle movement along the lower branches of the left facial nerve  Neuro/Psychiatric: AOx3.  Normal mood and affect.   Respiratory: Normal respiratory effort, no stridor, no retractions noted.  Voice: Rough, intelligible speech but hoarse.     Significant Labs:  CBC:   Recent Labs   Lab 11/17/18  0520   WBC 3.51*   RBC 4.01*   HGB 12.1*   HCT 38.1*   PLT 90*   MCV 95   MCH 30.2   MCHC 31.8*     CMP:   Recent Labs   Lab 11/17/18  0520   *   CALCIUM 9.0   ALBUMIN 3.1*   PROT 6.1      K 3.6   CO2 24      BUN 5*   CREATININE 1.2   ALKPHOS 91   ALT 17   AST 33   BILITOT 0.7       Significant Diagnostics:  None

## 2018-11-17 NOTE — HPI
Mr. Mcpherson is a 63M with peripheral T-cell lymphoma (last seen by Dr. Rao, patient of Dr. Reids, previously on EPOCH, currently receiving keytruda and Romidepsin treatment at [facility]), DM2 on metformin here for swelling and blistering of his face.      These symptoms started worsening 2.5 weeks ago. Patient was admitted from 10/30-11/1 for progressively worsening L-sided neck and facial plethora. CT head revealed no acute intracranial abnormality. CTA neck demonstrated a large infiltrating soft tissue mass involving the left face and neck as above, previously hypermetabolic on PET/CT and compatible with malignancy. Multiple prominent right submental and right internal jugular chain lymph nodes. He was started on oral steroids and patient stated he noted mild improvement of the swelling. Patient counseled on importance of receiving chemotherapy to treat the malignancy and swelling. He made the decision to f/u and receive chemotherapy at [facility]. He was discharged on oral prednisone and an outpatient appointment was made to f/u w/ Dr. Rao in clinic. He was advised on signs and symptoms to monitor which if present should prompt a visit to the ED including worsening facial swelling or airway compromise     Patient noted that his facial swelling got worse and developed blisters after receiving his first chemo cycle of pembrolizumab and romidepsin on Monday at [facility] - he is currently involved in a trial there. He has had L facial swelling prior to starting the trial, but there has been progressive inward swelling into his oral cavity, resulting in difficulty swallowing and sometimes difficulty breathing as well. There is also skin changes involving blistering and oozing. He denies any pain, fever, chills. There is also swelling on his L chest with accompanying erythema, but with no pain.      CT scan in the ED shows mild narrowing of his airway and small retro-pharyndeal effusion. ENT was consulted in  the ED, no not feel that there is airway compromise.  They did find, however, that L vocal cord shows signs of paralysis. Blood and wound cultures placed. Lactic acid at 3.7, patient received 1L NS.      Patient to be admitted to BMT service for further evaluation and management.

## 2018-11-17 NOTE — ASSESSMENT & PLAN NOTE
DDx include L sided facial cellulitis vs allergy to immunotherapy (Keytruda) vs effects of malignancy  -patients VS are stable, no significant WBC.   -patient given Vanc and Zosyn in the ED; will continue and add Bactrim for atypicals. Wound cx pending.   -BCX x2 pending, culture of the fluid drained from site pending as well  -initial lactic acid was elevated, and patient received 1L of NaCl in the ED now with repeat lactic acid pending  -CT scan showed mild airway narrowing and small retro pharyndeal effusion  -ENT consulted, conducted bedside laryngoscope and saw L side vocal cord paralysis  -will have low threshold for calling the ICU for airway patency, if patient develops SOB or stridor or any signs that he cannot manage secretions   -will have percocet and zofran PRN for pain and nausea control- added Compazine IV 2nd line.

## 2018-11-17 NOTE — PLAN OF CARE
Problem: Patient Care Overview  Goal: Plan of Care Review  Outcome: Ongoing (interventions implemented as appropriate)  Plan of care reviewed with the patient at the beginning of the shift. Pt admitted with bullous dermatitis with a history of T cell lymphoma. Chemo within the past week. Left sided facial swelling and redness with several blisters, some are intact and fluid filled, some have ruptured and are oozing yellow fluid. Swelling, redness, and blisters extend from pt's face to his neck and just redness on his chest. Pt did complain of pain this shift and was given Morphine and Dilaudid. Pt seen by wound care. Aquaphor applied per wound care recs. Lips are swollen with lesions. Airway remains patent. Sats are 92-98% on room air. Pt does report shortness of breath, but only when walking longer distances. Tele and continuous pulse ox monitoring initiated. Suction set up at the bedside. Pt was able to take PO meds in applesauce without difficulty. Fall precautions maintained. Pt remained free from falls and injury this shift. Bed locked in lowest position, side rails up x2, call light within reach. Instructed pt to call for assistance as needed. Pt verbalized understanding. Pt afebrile overnight. Vitals stable. Vanc and Zosyn continued. No acute issues overnight. Will continue to monitor.

## 2018-11-17 NOTE — PLAN OF CARE
Problem: Patient Care Overview  Goal: Plan of Care Review  Outcome: Ongoing (interventions implemented as appropriate)  POC reviewed with patient; understanding verbalized. LUE US complete. Pt had two episodes of emesis this shift; PRN Zofran administered with full relief.  No complaints of pain. Wound care done. Pt remains independent. Tele and  in place. Pt voids concetrated urine per the urinal; no BM this shift. Vanc and Zosyn administered as ordered. Wife to remain at bedside. Pt. with nonskid footwear on, bed in lowest position, and locked with bed rails up x2.  Pt. has call light and personal items within reach. VSS and afebrile this shift. All questions and concerns addressed at this time. Will continue to monitor.

## 2018-11-17 NOTE — PROGRESS NOTES
[facility]  Otorhinolaryngology-Head & Neck Surgery  Progress Note    Subjective:     Post-Op Info:  * No surgery found *      Hospital Day: 3     Interval History: No acute events overnight. Patient reports that he has had no problems with swallowing or breathing. Was evaluated by Heme/Onc and Palliative care.    Medications:  Continuous Infusions:  Scheduled Meds:   allopurinol  300 mg Oral Daily    enoxaparin  40 mg Subcutaneous Daily    gabapentin  100 mg Oral TID    piperacillin-tazobactam (ZOSYN) IVPB  4.5 g Intravenous Q8H    sulfamethoxazole-trimethoprim 200-40 mg/5 ml  40 mL Oral Q12H    vancomycin (VANCOCIN) IVPB  15 mg/kg Intravenous Q12H    white petrolatum   Topical (Top) BID     PRN Meds:dextrose 50%, dextrose 50%, glucagon (human recombinant), glucose, glucose, HYDROmorphone, influenza, morphine, ondansetron, prochlorperazine, sodium chloride 0.9%     Review of patient's allergies indicates:  No Known Allergies  Objective:     Vital Signs (24h Range):  Temp:  [97.9 °F (36.6 °C)-99 °F (37.2 °C)] 98.5 °F (36.9 °C)  Pulse:  [] 100  Resp:  [16-20] 19  SpO2:  [95 %-99 %] 96 %  BP: (137-166)/() 137/82     Date 11/17/18 0700 - 11/18/18 0659   Shift 8694-3805 6160-1319 7274-3599 24 Hour Total   INTAKE   P.O. 120   120   Shift Total(mL/kg) 120(1.2)   120(1.2)   OUTPUT   Emesis/NG output 300   300   Shift Total(mL/kg) 300(3.1)   300(3.1)   Weight (kg) 97.9 97.9 97.9 97.9     Lines/Drains/Airways     Peripherally Inserted Central Catheter Line                 PICC Double Lumen 06/22/18 1107 left basilic 148 days                Physical Exam  Constitutional:  NAD.  Head/Face: Marked left sided facial swelling with overlying skin changes extending from the cheek/ear to the neck. Copious yellow crusting. Some bullae. Non-tender to palpation. Mildly erythematous.  Oral Cavity: Lips are swollen. Left buccal soft tissue is swollen, firm. Non-tender. FOM Soft, no masses palpated.  Oral Tongue mobile, normal in size. Hard Palate WNL.   Neck/Lymphatic: diffuse left sided neck swelling with crusting and bullae, erythema which extends to the left chest  Face:Significant left sided facial swelling and soft tissue cellulitis. Some decreased muscle movement along the lower branches of the left facial nerve  Neuro/Psychiatric: AOx3.  Normal mood and affect.   Respiratory: Normal respiratory effort, no stridor, no retractions noted.  Voice: Rough, intelligible speech but hoarse.     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 Normal work of breathing, but left true vocal fold appears to be paralyzed. Airway currently patent.   Fluid/inflammation likely represents tumor necrosis following initiation of recent chemo.    -Agree with treatment per Primary and HemOnc  -Agree with broad spectrum abx  -Lymphoma is not an operative disease  -Would be very hesitant to perform I&D of fluid within tumor bed   Any attempts would inevitably lead to non healing wound/fistula for life   Continue abx and wound care for now   -L TVC  Motion impairment, airway is currently patent, worsening tumor burden may lead to worsening respiratory status, monitor closely  -recommend MBSS to further evaluate dysphagia  -please call ENT with any questions or concerns       Vocal cord paralysis, unilateral partial    - Stable, last scope exam performed yesterday  - Patient continues to have rough voice  - No issues with  swallowing reported by patient or nursing  - No stridor or increased WOB on exam     T-cell lymphoma    - Care per Hem/Onc  - Palliative Care now involved         Alden Padilla MD  Otorhinolaryngology-Head & Neck Surgery  [facility]

## 2018-11-17 NOTE — PROGRESS NOTES
[facility]  Hematology  Bone Marrow Transplant  Progress Note    Patient Name: Arjun Mcpherson Jr.  Admission Date: 11/15/2018  Hospital Length of Stay: 2 days  Code Status: Full Code    Subjective:     Interval History: Remains on broad spectrum vanc/Zosyn/ Bactrim today, had nausea with vomiting requiring parenteral antiemetics. Patient and wife seen and counseled by Palliative yesterday, remains full code. Airway patent and stable. DVT US for left upper ext swelling today.     Objective:     Vital Signs (Most Recent):  Temp: 98.5 °F (36.9 °C) (11/17/18 0729)  Pulse: 106 (11/17/18 1141)  Resp: 19 (11/17/18 0729)  BP: 137/82 (11/17/18 0729)  SpO2: (!) 89 % (11/17/18 1141) Vital Signs (24h Range):  Temp:  [97.9 °F (36.6 °C)-99 °F (37.2 °C)] 98.5 °F (36.9 °C)  Pulse:  [] 106  Resp:  [16-20] 19  SpO2:  [89 %-99 %] 89 %  BP: (137-166)/() 137/82     Weight: 97.9 kg (215 lb 13.3 oz)  Body mass index is 27.71 kg/m².  Body surface area is 2.26 meters squared.    ECOG SCORE         [unfilled]    Intake/Output - Last 3 Shifts       11/15 0700 - 11/16 0659 11/16 0700 - 11/17 0659 11/17 0700 - 11/18 0659    P.O. 0 120 120    IV Piggyback 1800 550     Total Intake(mL/kg) 1800 (18.4) 670 (6.8) 120 (1.2)    Urine (mL/kg/hr) 950 400 (0.2)     Emesis/NG output   400    Stool 0      Total Output 950 400 400    Net +850 +270 -280           Urine Occurrence  2 x     Stool Occurrence 2 x            Physical Exam   Constitutional: He is oriented to person, place, and time. Vital signs are normal. He is cooperative. No distress.   HENT:   Right Ear: External ear normal.   Left Ear: External ear normal.   Large necrotic mass to left neck and face  Yellow crusting to facial skin breakdown.      Eyes: EOM are normal. Pupils are equal, round, and reactive to light. Right eye exhibits no discharge. Left eye exhibits no discharge. No scleral icterus.   Neck: Normal range of motion. No JVD present. No tracheal  deviation present.   Cardiovascular: Regular rhythm, normal heart sounds and intact distal pulses. Exam reveals no gallop and no friction rub.   No murmur heard.  Pulmonary/Chest: Effort normal and breath sounds normal. No respiratory distress. He has no wheezes. He has no rales. He exhibits no tenderness.   Abdominal: Soft. Bowel sounds are normal. He exhibits no distension and no mass. There is no guarding.   Musculoskeletal: Normal range of motion. He exhibits edema. He exhibits no tenderness.   Left upper arm edema   Neurological: He is alert and oriented to person, place, and time. No cranial nerve deficit. Coordination normal.   Skin: Skin is warm. Capillary refill takes less than 2 seconds. Rash noted. He is not diaphoretic. No erythema. No pallor.   Psychiatric: He has a normal mood and affect.   Nursing note and vitals reviewed.      Significant Labs:   CBC:   Recent Labs   Lab 11/15/18  1734 11/16/18  0358 11/17/18  0520   WBC 3.30* 2.50* 3.51*   HGB 12.5* 12.0* 12.1*   HCT 39.6* 38.0* 38.1*    129* 90*   , CMP:   Recent Labs   Lab 11/15/18  1734 11/16/18  0358 11/17/18  0520    138 137   K 4.0 3.7 3.6    103 102   CO2 23 26 24   * 118* 134*   BUN 5* 5* 5*   CREATININE 1.0 0.9 1.2   CALCIUM 9.1 9.0 9.0   PROT 6.4 6.0 6.1   ALBUMIN 3.2* 3.1* 3.1*   BILITOT 0.8 0.7 0.7   ALKPHOS 98 90 91   AST 33 29 33   ALT 17 15 17   ANIONGAP 11 9 11   EGFRNONAA >60.0 >60.0 >60.0   , Coagulation: No results for input(s): PT, INR, APTT in the last 48 hours., Haptoglobin: No results for input(s): HAPTOGLOBIN in the last 48 hours., Immunology: No results for input(s): SPEP, HANSEL, LOY, FREELAMBDALI in the last 48 hours., LDH: No results for input(s): LDHCSF, BFSOURCE in the last 48 hours., LFTs:   Recent Labs   Lab 11/15/18  1734 11/16/18  0358 11/17/18  0520   ALT 17 15 17   AST 33 29 33   ALKPHOS 98 90 91   BILITOT 0.8 0.7 0.7   PROT 6.4 6.0 6.1   ALBUMIN 3.2* 3.1* 3.1*   , Reticulocytes: No results  for input(s): RETIC in the last 48 hours., Urine Studies:   Recent Labs   Lab 11/15/18  2346   COLORU Yellow   APPEARANCEUA Clear   PHUR 5.0   SPECGRAV >=1.030*   PROTEINUA Negative   GLUCUA Negative   KETONESU 1+*   BILIRUBINUA Negative   OCCULTUA 1+*   NITRITE Negative   LEUKOCYTESUR Negative   RBCUA 2   WBCUA 2   BACTERIA Rare    and All pertinent labs from the last 24 hours have been reviewed.    Diagnostic Results:  I have reviewed and interpreted all pertinent imaging results/findings within the past 24 hours.    Assessment/Plan:     * Left facial swelling    DDx include L sided facial cellulitis vs allergy to immunotherapy (Keytruda) vs effects of malignancy  -patient given Vanc and Zosyn in the ED; will continue and add Bactrim for atypicals. Wound cx pending.   -BCX x2 pending, culture of the fluid drained from site pending as well  --CT scan showed mild airway narrowing and small retro pharyndeal effusion  -ENT consulted, conducted bedside laryngoscope and saw L side vocal cord paralysis  -will have low threshold for calling the ICU for airway patency, if patient develops SOB or stridor or any signs that he cannot manage secretions   -will have percocet and zofran PRN for pain and nausea control- added Compazine IV 2nd line.         Palliative care encounter    -T cell lymphoma with necrotic mass and significant morbidity- Palliative consulted, goals ongoing. Full code      Vocal cord paralysis, unilateral partial    -seen on ENT scope day of admission. Diet dysphagia solid cleared per SLP     Dysphagia    -assessed by ENT; ok for diet per SLP     Acute bullous dermatitis    -wound care and broad spectrum antibiotics     Type 2 diabetes mellitus without complication, with long-term current use of insulin    -on metformin at home, SSI for now.      T-cell lymphoma    follows with Dr. Rao, last seen 10/15  -Pt was being treated for peripheral T-cell lymphoma by .  At the time of diagnosis, it  appeared that all of the disease was confined to his neck and most of it was surgically removed.  A PET scan June 2018 was normal except for a lymph node just to the  left of midline in the upper neck with an SUV max of 18.2.     -s/p five courses of dose adjusted EPOCH.   cyclophosphamide and etoposide have been increased twice based on neutrophil counts and platelet counts.  Adriamycin not incrased because higher than normal risk of future cardiac disease.  Also, the dosage of vincristine was decreased slightly because of diabetes mellitus and numbness in his feet.  The numbness in the feet has not increased.   - s/p MD Brock for first dose of Keytruda and Romidepsin on 11/13.          VTE Risk Mitigation (From admission, onward)        Ordered     enoxaparin injection 40 mg  Daily      11/16/18 1315     Place NATO hose  Until discontinued      11/15/18 2214     Place sequential compression device  Until discontinued      11/15/18 2214     Reason for no Mechanical VTE Prophylaxis  Once      11/15/18 2214     IP VTE HIGH RISK PATIENT  Once      11/15/18 2214          Disposition: BMT [place]     Robi Youngblood MD  Bone Marrow Transplant  [facility]

## 2018-11-17 NOTE — ASSESSMENT & PLAN NOTE
- Stable, last scope exam performed yesterday  - Patient continues to have rough voice  - No issues with swallowing reported by patient or nursing  - No stridor or increased WOB on exam

## 2018-11-17 NOTE — SUBJECTIVE & OBJECTIVE
Subjective:     Interval History: Remains on broad spectrum vanc/Zosyn/ Bactrim today, had nausea with vomiting requiring parenteral antiemetics. Patient and wife seen and counseled by Palliative yesterday, remains full code. Airway patent and stable. DVT US for left upper ext swelling today.     Objective:     Vital Signs (Most Recent):  Temp: 98.5 °F (36.9 °C) (11/17/18 0729)  Pulse: 106 (11/17/18 1141)  Resp: 19 (11/17/18 0729)  BP: 137/82 (11/17/18 0729)  SpO2: (!) 89 % (11/17/18 1141) Vital Signs (24h Range):  Temp:  [97.9 °F (36.6 °C)-99 °F (37.2 °C)] 98.5 °F (36.9 °C)  Pulse:  [] 106  Resp:  [16-20] 19  SpO2:  [89 %-99 %] 89 %  BP: (137-166)/() 137/82     Weight: 97.9 kg (215 lb 13.3 oz)  Body mass index is 27.71 kg/m².  Body surface area is 2.26 meters squared.    ECOG SCORE         [unfilled]    Intake/Output - Last 3 Shifts       11/15 0700 - 11/16 0659 11/16 0700 - 11/17 0659 11/17 0700 - 11/18 0659    P.O. 0 120 120    IV Piggyback 1800 550     Total Intake(mL/kg) 1800 (18.4) 670 (6.8) 120 (1.2)    Urine (mL/kg/hr) 950 400 (0.2)     Emesis/NG output   400    Stool 0      Total Output 950 400 400    Net +850 +270 -280           Urine Occurrence  2 x     Stool Occurrence 2 x            Physical Exam   Constitutional: He is oriented to person, place, and time. Vital signs are normal. He is cooperative. No distress.   HENT:   Right Ear: External ear normal.   Left Ear: External ear normal.   Large necrotic mass to left neck and face  Yellow crusting to facial skin breakdown.      Eyes: EOM are normal. Pupils are equal, round, and reactive to light. Right eye exhibits no discharge. Left eye exhibits no discharge. No scleral icterus.   Neck: Normal range of motion. No JVD present. No tracheal deviation present.   Cardiovascular: Regular rhythm, normal heart sounds and intact distal pulses. Exam reveals no gallop and no friction rub.   No murmur heard.  Pulmonary/Chest: Effort normal and breath  sounds normal. No respiratory distress. He has no wheezes. He has no rales. He exhibits no tenderness.   Abdominal: Soft. Bowel sounds are normal. He exhibits no distension and no mass. There is no guarding.   Musculoskeletal: Normal range of motion. He exhibits edema. He exhibits no tenderness.   Left upper arm edema   Neurological: He is alert and oriented to person, place, and time. No cranial nerve deficit. Coordination normal.   Skin: Skin is warm. Capillary refill takes less than 2 seconds. Rash noted. He is not diaphoretic. No erythema. No pallor.   Psychiatric: He has a normal mood and affect.   Nursing note and vitals reviewed.      Significant Labs:   CBC:   Recent Labs   Lab 11/15/18  1734 11/16/18  0358 11/17/18  0520   WBC 3.30* 2.50* 3.51*   HGB 12.5* 12.0* 12.1*   HCT 39.6* 38.0* 38.1*    129* 90*   , CMP:   Recent Labs   Lab 11/15/18  1734 11/16/18  0358 11/17/18  0520    138 137   K 4.0 3.7 3.6    103 102   CO2 23 26 24   * 118* 134*   BUN 5* 5* 5*   CREATININE 1.0 0.9 1.2   CALCIUM 9.1 9.0 9.0   PROT 6.4 6.0 6.1   ALBUMIN 3.2* 3.1* 3.1*   BILITOT 0.8 0.7 0.7   ALKPHOS 98 90 91   AST 33 29 33   ALT 17 15 17   ANIONGAP 11 9 11   EGFRNONAA >60.0 >60.0 >60.0   , Coagulation: No results for input(s): PT, INR, APTT in the last 48 hours., Haptoglobin: No results for input(s): HAPTOGLOBIN in the last 48 hours., Immunology: No results for input(s): SPEP, HANSEL, LOY, FREELAMBDALI in the last 48 hours., LDH: No results for input(s): LDHCSF, BFSOURCE in the last 48 hours., LFTs:   Recent Labs   Lab 11/15/18  1734 11/16/18  0358 11/17/18  0520   ALT 17 15 17   AST 33 29 33   ALKPHOS 98 90 91   BILITOT 0.8 0.7 0.7   PROT 6.4 6.0 6.1   ALBUMIN 3.2* 3.1* 3.1*   , Reticulocytes: No results for input(s): RETIC in the last 48 hours., Urine Studies:   Recent Labs   Lab 11/15/18  9990   COLORU Yellow   APPEARANCEUA Clear   PHUR 5.0   SPECGRAV >=1.030*   PROTEINUA Negative   GLUCUA Negative    KETONESU 1+*   BILIRUBINUA Negative   OCCULTUA 1+*   NITRITE Negative   LEUKOCYTESUR Negative   RBCUA 2   WBCUA 2   BACTERIA Rare    and All pertinent labs from the last 24 hours have been reviewed.    Diagnostic Results:  I have reviewed and interpreted all pertinent imaging results/findings within the past 24 hours.

## 2018-11-17 NOTE — ASSESSMENT & PLAN NOTE
follows with Dr. Rao, last seen 10/15  -Pt was being treated for peripheral T-cell lymphoma by .  At the time of diagnosis, it appeared that all of the disease was confined to his neck and most of it was surgically removed.  A PET scan in June 2018 was normal except for a lymph node just to the  left of midline in the upper neck with an SUV max of 18.2.  That node was palpable, but it disappeared after the first course of therapy.  His pretreatment LDH was normal at 175.   -He has completed five courses of dose adjusted EPOCH.  The doses of cyclophosphamide and etoposide have been increased twice based on neutrophil counts and platelet counts.  Adriamycin not incrased because higher than normal risk of future cardiac disease.  Also, the dosage of vincristine was decreased slightly because of diabetes mellitus and numbness in his feet.  The numbness in the feet has not increased.   - He is planning on going to [facility] for first dose of Keytruda and Romidepsin on 11/13.

## 2018-11-18 LAB
ALBUMIN SERPL BCP-MCNC: 3 G/DL
ALP SERPL-CCNC: 109 U/L
ALT SERPL W/O P-5'-P-CCNC: 21 U/L
ANION GAP SERPL CALC-SCNC: 12 MMOL/L
AST SERPL-CCNC: 41 U/L
BASOPHILS # BLD AUTO: 0.03 K/UL
BASOPHILS NFR BLD: 0.8 %
BILIRUB SERPL-MCNC: 0.6 MG/DL
BUN SERPL-MCNC: 7 MG/DL
CALCIUM SERPL-MCNC: 9 MG/DL
CHLORIDE SERPL-SCNC: 102 MMOL/L
CO2 SERPL-SCNC: 25 MMOL/L
CREAT SERPL-MCNC: 2 MG/DL
CREAT UR-MCNC: 135 MG/DL
DIFFERENTIAL METHOD: ABNORMAL
EOSINOPHIL # BLD AUTO: 0 K/UL
EOSINOPHIL NFR BLD: 1.1 %
EOSINOPHIL URNS QL WRIGHT STN: NORMAL
ERYTHROCYTE [DISTWIDTH] IN BLOOD BY AUTOMATED COUNT: 13.6 %
EST. GFR  (AFRICAN AMERICAN): 40.4 ML/MIN/1.73 M^2
EST. GFR  (NON AFRICAN AMERICAN): 35 ML/MIN/1.73 M^2
FIBRINOGEN PPP-MCNC: 375 MG/DL
GLUCOSE SERPL-MCNC: 120 MG/DL
HCT VFR BLD AUTO: 37 %
HGB BLD-MCNC: 11.7 G/DL
IMM GRANULOCYTES # BLD AUTO: 0.01 K/UL
IMM GRANULOCYTES NFR BLD AUTO: 0.3 %
INR PPP: 1.1
LDH SERPL L TO P-CCNC: 1199 U/L
LYMPHOCYTES # BLD AUTO: 0.5 K/UL
LYMPHOCYTES NFR BLD: 13.6 %
MAGNESIUM SERPL-MCNC: 1.9 MG/DL
MCH RBC QN AUTO: 30.2 PG
MCHC RBC AUTO-ENTMCNC: 31.6 G/DL
MCV RBC AUTO: 95 FL
MONOCYTES # BLD AUTO: 0.8 K/UL
MONOCYTES NFR BLD: 20.7 %
NEUTROPHILS # BLD AUTO: 2.3 K/UL
NEUTROPHILS NFR BLD: 63.5 %
NRBC BLD-RTO: 0 /100 WBC
PHOSPHATE SERPL-MCNC: 3.5 MG/DL
PLATELET # BLD AUTO: 90 K/UL
PMV BLD AUTO: 9.8 FL
POTASSIUM SERPL-SCNC: 3.6 MMOL/L
PROT SERPL-MCNC: 5.9 G/DL
PROTHROMBIN TIME: 11.1 SEC
RBC # BLD AUTO: 3.88 M/UL
SODIUM SERPL-SCNC: 139 MMOL/L
SODIUM UR-SCNC: 44 MMOL/L
URATE SERPL-MCNC: 2.9 MG/DL
VANCOMYCIN SERPL-MCNC: 32.9 UG/ML
WBC # BLD AUTO: 3.67 K/UL

## 2018-11-18 PROCEDURE — 83615 LACTATE (LD) (LDH) ENZYME: CPT

## 2018-11-18 PROCEDURE — 85025 COMPLETE CBC W/AUTO DIFF WBC: CPT

## 2018-11-18 PROCEDURE — 92526 ORAL FUNCTION THERAPY: CPT

## 2018-11-18 PROCEDURE — 63600175 PHARM REV CODE 636 W HCPCS: Performed by: STUDENT IN AN ORGANIZED HEALTH CARE EDUCATION/TRAINING PROGRAM

## 2018-11-18 PROCEDURE — 82570 ASSAY OF URINE CREATININE: CPT

## 2018-11-18 PROCEDURE — 25000003 PHARM REV CODE 250: Performed by: STUDENT IN AN ORGANIZED HEALTH CARE EDUCATION/TRAINING PROGRAM

## 2018-11-18 PROCEDURE — 20600001 HC STEP DOWN PRIVATE ROOM

## 2018-11-18 PROCEDURE — 85384 FIBRINOGEN ACTIVITY: CPT

## 2018-11-18 PROCEDURE — 80202 ASSAY OF VANCOMYCIN: CPT

## 2018-11-18 PROCEDURE — 83735 ASSAY OF MAGNESIUM: CPT

## 2018-11-18 PROCEDURE — 97535 SELF CARE MNGMENT TRAINING: CPT

## 2018-11-18 PROCEDURE — 84100 ASSAY OF PHOSPHORUS: CPT

## 2018-11-18 PROCEDURE — 80053 COMPREHEN METABOLIC PANEL: CPT

## 2018-11-18 PROCEDURE — 97161 PT EVAL LOW COMPLEX 20 MIN: CPT

## 2018-11-18 PROCEDURE — 63600175 PHARM REV CODE 636 W HCPCS: Performed by: INTERNAL MEDICINE

## 2018-11-18 PROCEDURE — 99233 SBSQ HOSP IP/OBS HIGH 50: CPT | Mod: ,,, | Performed by: INTERNAL MEDICINE

## 2018-11-18 PROCEDURE — 25000003 PHARM REV CODE 250: Performed by: INTERNAL MEDICINE

## 2018-11-18 PROCEDURE — 84300 ASSAY OF URINE SODIUM: CPT

## 2018-11-18 PROCEDURE — 87205 SMEAR GRAM STAIN: CPT

## 2018-11-18 PROCEDURE — G8979 MOBILITY GOAL STATUS: HCPCS | Mod: CH

## 2018-11-18 PROCEDURE — G8980 MOBILITY D/C STATUS: HCPCS | Mod: CH

## 2018-11-18 PROCEDURE — 85610 PROTHROMBIN TIME: CPT

## 2018-11-18 PROCEDURE — G8978 MOBILITY CURRENT STATUS: HCPCS | Mod: CH

## 2018-11-18 PROCEDURE — 84550 ASSAY OF BLOOD/URIC ACID: CPT

## 2018-11-18 PROCEDURE — 36593 DECLOT VASCULAR DEVICE: CPT

## 2018-11-18 RX ORDER — VANCOMYCIN HCL IN 5 % DEXTROSE 1.5G/250ML
15 PLASTIC BAG, INJECTION (ML) INTRAVENOUS
Status: DISCONTINUED | OUTPATIENT
Start: 2018-11-19 | End: 2018-11-18

## 2018-11-18 RX ORDER — SODIUM CHLORIDE, SODIUM LACTATE, POTASSIUM CHLORIDE, CALCIUM CHLORIDE 600; 310; 30; 20 MG/100ML; MG/100ML; MG/100ML; MG/100ML
INJECTION, SOLUTION INTRAVENOUS CONTINUOUS
Status: ACTIVE | OUTPATIENT
Start: 2018-11-18 | End: 2018-11-18

## 2018-11-18 RX ORDER — VANCOMYCIN HCL IN 5 % DEXTROSE 1.5G/250ML
15 PLASTIC BAG, INJECTION (ML) INTRAVENOUS
Status: DISCONTINUED | OUTPATIENT
Start: 2018-11-18 | End: 2018-11-18

## 2018-11-18 RX ORDER — SODIUM CHLORIDE, SODIUM LACTATE, POTASSIUM CHLORIDE, CALCIUM CHLORIDE 600; 310; 30; 20 MG/100ML; MG/100ML; MG/100ML; MG/100ML
INJECTION, SOLUTION INTRAVENOUS CONTINUOUS
Status: DISCONTINUED | OUTPATIENT
Start: 2018-11-18 | End: 2018-11-18

## 2018-11-18 RX ADMIN — Medication 1 MG: at 07:11

## 2018-11-18 RX ADMIN — SODIUM CHLORIDE, SODIUM LACTATE, POTASSIUM CHLORIDE, AND CALCIUM CHLORIDE: .6; .31; .03; .02 INJECTION, SOLUTION INTRAVENOUS at 09:11

## 2018-11-18 RX ADMIN — WHITE PETROLATUM: 1.75 OINTMENT TOPICAL at 08:11

## 2018-11-18 RX ADMIN — GABAPENTIN 100 MG: 100 CAPSULE ORAL at 03:11

## 2018-11-18 RX ADMIN — GABAPENTIN 100 MG: 100 CAPSULE ORAL at 08:11

## 2018-11-18 RX ADMIN — PIPERACILLIN AND TAZOBACTAM 4.5 G: 4; .5 INJECTION, POWDER, LYOPHILIZED, FOR SOLUTION INTRAVENOUS; PARENTERAL at 10:11

## 2018-11-18 RX ADMIN — ONDANSETRON 4 MG: 2 INJECTION INTRAMUSCULAR; INTRAVENOUS at 04:11

## 2018-11-18 RX ADMIN — ALLOPURINOL 300 MG: 300 TABLET ORAL at 08:11

## 2018-11-18 RX ADMIN — Medication 1 MG: at 08:11

## 2018-11-18 RX ADMIN — PIPERACILLIN AND TAZOBACTAM 4.5 G: 4; .5 INJECTION, POWDER, LYOPHILIZED, FOR SOLUTION INTRAVENOUS; PARENTERAL at 01:11

## 2018-11-18 RX ADMIN — PIPERACILLIN AND TAZOBACTAM 4.5 G: 4; .5 INJECTION, POWDER, LYOPHILIZED, FOR SOLUTION INTRAVENOUS; PARENTERAL at 05:11

## 2018-11-18 RX ADMIN — ONDANSETRON 4 MG: 2 INJECTION INTRAMUSCULAR; INTRAVENOUS at 08:11

## 2018-11-18 RX ADMIN — ENOXAPARIN SODIUM 40 MG: 100 INJECTION SUBCUTANEOUS at 04:11

## 2018-11-18 RX ADMIN — SODIUM CHLORIDE 1000 ML: 0.9 INJECTION, SOLUTION INTRAVENOUS at 06:11

## 2018-11-18 RX ADMIN — VANCOMYCIN HYDROCHLORIDE 1500 MG: 10 INJECTION, POWDER, LYOPHILIZED, FOR SOLUTION INTRAVENOUS at 05:11

## 2018-11-18 RX ADMIN — SULFAMETHOXAZOLE AND TRIMETHOPRIM 40 ML: 200; 40 SUSPENSION ORAL at 08:11

## 2018-11-18 NOTE — PT/OT/SLP PROGRESS
"Speech Language Pathology Treatment    Patient Name:  Arjun Mcpherson Jr.   MRN:  [id number]  Admitting Diagnosis: Left facial swelling    Recommendations:                 General Recommendations:  Dysphagia therapy  Diet recommendations:  Mechanical soft, Liquid Diet Level: Nectar Thick   Aspiration Precautions: 1 bite/sip at a time, Alternating bites/sips, Feed only when awake/alert, HOB to 90 degrees, Meds whole buried in puree, Monitor for s/s of aspiration, Small bites/sips and Strict aspiration precautions   General Precautions: Standard, fall, aspiration(mechanical soft)  Communication strategies:  none    Subjective     "I hate that food" referring to the puree. "I just want a turkey sandwich." Pt agreeable and alert t/o session. No complaints.   Patient goals: to go home    Pain/Comfort:  · Pain Rating 1: 0/10  · Pain Rating Post-Intervention 1: 0/10    Objective:     Has the patient been evaluated by SLP for swallowing?   Yes  Keep patient NPO? No   Current Respiratory Status: room air      SLP communicated with nurse prior to entering room. Nurse stated that pt was not eating anything, however, had tolerated medications without difficulty.   Pt seen bedside for ongoing swallow assessment with wife present. Pt offered and accepted sandwich X 3/4 and nectar thick liquids via straw. Pt demonstrated prolonged mastication time, suspect 2/2 edema. Pt reported decreased pain in oral cavity at this date as compared to previous ST session. However, pt continued to report chewing his cheek. Pt denied any pain throughout po trials. Pt tolerated all po trials at this date without difficulty or overt signs of airway compromise. In addition, pt maintained strong vocal quality throughout all trials. Pt stated despite chewing L cheek, he desired to advance diet to mechanical soft at this time. ST recommending CAUTIOUS ADVANCEMENT to MECHANICAL SOFT SOLIDS with NECTAR THICK LIQUIDS at this time with STRICT aspiration " precautions.   Extensive education provided to spouse and patient re: safe swallowing strategies, cautious advancement to mechanical soft solids with continued nectar thick liquids, strict aspiration precautions, role of SLP and POC. All verbalized understanding.  Pt left in NAD with spouse in room.   SLP communicated findings with nurse and MD team, who were both in agreement with ST recommendations.   Whiteboard updated.     Assessment:     Arjun Mcpherson Jr. is a 63 y.o. male with an SLP diagnosis of Dysphagia.      Goals:   Multidisciplinary Problems     SLP Goals        Problem: SLP Goal    Goal Priority Disciplines Outcome   SLP Goal     SLP Ongoing (interventions implemented as appropriate)   Description:  Speech Language Pathology Goals  Goals expected to be met by 11/30    1. Pt will tolerate diet of nectar thickened liquids and purees solids   2. Pt will participate in additional PO trials during future speech therapy sessions to help determine least restrictive diet                       Plan:     · Patient to be seen:  4 x/week   · Plan of Care expires:  12/15/18  · Plan of Care reviewed with:  patient, spouse   · SLP Follow-Up:  Yes       Discharge recommendations:  outpatient speech therapy     Time Tracking:     SLP Treatment Date:   11/18/18  Speech Start Time:  1333  Speech Stop Time:  1404     Speech Total Time (min):  31 min    Billable Minutes: Treatment Swallowing Dysfunction 23 and Seld Care/Home Management Training 8     Alisha Brown M.S., CCC-SLP  Speech Language Pathologist  [phone] - pager   11/18/2018      Alisha Brown, MS CCC-SLP  11/18/2018

## 2018-11-18 NOTE — PLAN OF CARE
Problem: Physical Therapy Goal  Goal: Physical Therapy Goal  Outcome: Outcome(s) achieved Date Met: 11/18/18  Patient at this time is at their functional baseline and does not require skilled acute PT services at this time. Please re consult PT if pt has change in functional status.   Parag Nagy PT, DPT  11/18/2018  Pager: [phone]

## 2018-11-18 NOTE — PT/OT/SLP EVAL
Physical Therapy Evaluation and Discharge Note    Patient Name:  Arjun Mcpherson Jr.   MRN:  [id number]    Recommendations:     Discharge Recommendations:  home   Discharge Equipment Recommendations: none   Barriers to discharge: None    Assessment:     Arjun Mcpherson Jr. is a 63 y.o. male admitted with a medical diagnosis of Left facial swelling. .  At this time, patient is functioning at their prior level of function and does not require further acute PT services.     Recent Surgery: * No surgery found *      Plan:     During this hospitalization, patient does not require further acute PT services.  Please re-consult if situation changes.      Subjective     Chief Complaint: none; pt very pleasant  Patient/Family Comments/goals: to return home  Pain/Comfort:  · Pain Rating 1: 0/10  · Pain Rating Post-Intervention 1: 0/10  · Pain Rating Post-Intervention 2: 0/10    Patients cultural, spiritual, Cheondoism conflicts given the current situation: none stated    Living Environment:  Pt lives with his wife and son in a [facility] with no [facility].   Prior to admission, patients level of function was ( I) with all ADLs, driving and ambulation community distances. .  Equipment used at home: none.  DME owned (not currently used): none.  Upon discharge, patient will have assistance from wife/son.    Objective:     Communicated with RN  prior to session.  Patient found supine upon PT entry to room found with: peripheral IV, telemetry     General Precautions: Standard, fall   Orthopedic Precautions:N/A   Braces: N/A     Exams:  · Cognitive Exam:  Patient is oriented to Person, Place, Time and Situation  · Fine Motor Coordination: -       Intact  · Gross Motor Coordination:  WFL  · Postural Exam:  Patient presented with the following abnormalities: -       Rounded shoulders  · -       Forward head  · Sensation: -       Intact  · RLE ROM: WFL  · RLE Strength: Deficits: WFL  · LLE ROM: WFL  · LLE Strength: WFL    Functional  Mobility:  · Bed Mobility:  Rolling Left:  modified independence  · Supine to Sit: modified independence  · Sit to Supine: modified independence  · Transfers:  Sit to Stand:  modified independence with no AD  · Gait: x 310 feet with mod ( I); PT assist for IV pole mgmt  · Balance: mod ( I) for gait; no increasd in swa or LOB    AM-PAC 6 CLICK MOBILITY  Total Score:24       Therapeutic Activities and Exercises:     Patient education  · Patient educated on the role of PT and POC  · Patient educated on importance  activity while in the hosptial per tolerance for improved endurance and to limit deconditioning   · Patient educated on safe transfers with nursing as appropriate  · Patient educated on energy conservation, pursed lip breathing  · Patient educated on proper transfer mechanics and safety  · All of patients questions were answered within the scope of PT        AM-PAC 6 CLICK MOBILITY  Total Score:24     Patient left HOB elevated with all lines intact and call button in reach.    GOALS:   Multidisciplinary Problems     Physical Therapy Goals     Not on file          Multidisciplinary Problems (Resolved)        Problem: Physical Therapy Goal    Goal Priority Disciplines Outcome Goal Variances Interventions   Physical Therapy Goal   (Resolved)     PT, PT/OT Outcome(s) achieved                     History:     Past Medical History:   Diagnosis Date    Cancer     Hearing loss     Peripheral neuropathy 6/19/2018    Pre-diabetes        Past Surgical History:   Procedure Laterality Date    DISSECTION OF NECK Left 5/30/2018    Procedure: DISSECTION, NECK;  Surgeon: Regan King MD;  Location: [facility] OR [address] [place] [address];  Service: ENT;  Laterality: Left;    DISSECTION, NECK Left 5/30/2018    Performed by Regan King MD at [facility] OR [address] [place] [address]    EXCISION OF PAROTID GLAND Left 5/30/2018    Procedure: EXCISION, PAROTID GLAND;  Surgeon: Regan King MD;  Location: [facility] OR [address] [place] [address];  Service: ENT;  Laterality:  Left;    EXCISION, PAROTID GLAND Left 5/30/2018    Performed by Regan King MD at [facility] OR 2ND FLR    KNEE SURGERY Right        Clinical Decision Making:     History  Co-morbidities and personal factors that may impact the plan of care Examination  Body Structures and Functions, activity limitations and participation restrictions that may impact the plan of care Clinical Presentation   Decision Making/ Complexity Score   Co-morbidities:   [] Time since onset of injury / illness / exacerbation  [] Status of current condition  []Patient's cognitive status and safety concerns    [x] Multiple Medical Problems (see med hx)  Personal Factors:   [] Patient's age  [] Prior Level of function   [] Patient's home situation (environment and family support)  [] Patient's level of motivation  [] Expected progression of patient      HISTORY:(criteria)    [x] 61056 - no personal factors/history    [] 23469 - has 1-2 personal factor/comorbidity     [] 10986 - has >3 personal factor/comorbidity     Body Regions:  [] Objective examination findings  [] Head     []  Neck  [] Trunk   [] Upper Extremity  [] Lower Extremity    Body Systems:  [] For communication ability, affect, cognition, language, and learning style: the assessment of the ability to make needs known, consciousness, orientation (person, place, and time), expected emotional /behavioral responses, and learning preferences (eg, learning barriers, education  needs)  [] For the neuromuscular system: a general assessment of gross coordinated movement (eg, balance, gait, locomotion, transfers, and transitions) and motor function  (motor control and motor learning)  [] For the musculoskeletal system: the assessment of gross symmetry, gross range of motion, gross strength, height, and weight  [x] For the integumentary system: the assessment of pliability(texture), presence of scar formation, skin color, and skin integrity  [] For cardiovascular/pulmonary system: the  assessment of heart rate, respiratory rate, blood pressure, and edema     Activity limitations:    [] Patient's cognitive status and saf ety concerns          [x] Status of current condition      [] Weight bearing restriction  [] Cardiopulmunary Restriction    Participation Restrictions:   [] Goals and goal agreement with the patient     [] Rehab potential (prognosis) and probable outcome      Examination of Body System: (criteria)    [x] 35311 - addressing 1-2 elements    [] 61307 - addressing a total of 3 or more elements     [] 47304 -  Addressing a total of 4 or more elements         Clinical Presentation: (criteria)  Stable - 68426     On examination of body system using standardized tests and measures patient presents with 1-2 elements from any of the following: body structures and functions, activity limitations, and/or participation restrictions.  Leading to a clinical presentation that is considered stable and/or uncomplicated                              Clinical Decision Making  (Eval Complexity):  Low- 88073     Time Tracking:     PT Received On: 11/18/18  PT Start Time: 0649     PT Stop Time: 0658  PT Total Time (min): 9 min     Billable Minutes: Evaluation 9 min      Parag Nagy, PT  11/18/2018

## 2018-11-18 NOTE — PLAN OF CARE
Problem: Patient Care Overview  Goal: Plan of Care Review  Outcome: Ongoing (interventions implemented as appropriate)  Plan of care reviewed with the patient at the beginning of the shift. Pt admitted with bullous dermatitis with a history of T cell lymphoma. Chemo within the past week. Left sided facial swelling and redness with several blisters, some are intact and fluid filled, some have ruptured and are oozing yellow fluid. Swelling, redness, and blisters extend from pt's face to his neck and just redness on his chest. Pt did complain of pain this shift and was given Dilaudid. Pt seen by wound care. Aquaphor applied per wound care recs. Lips are swollen with lesions. Airway remains patent. Sats are 92-98% on room air. Pt does report shortness of breath, but only when walking longer distances. Tele and continuous pulse ox monitoring initiated. Suction set up at the bedside. Pt was able to take PO meds in applesauce without difficulty. Pt with nausea/vomiting x1 this shift. IV Zofran given. Fall precautions maintained. Pt remained free from falls and injury this shift. Bed locked in lowest position, side rails up x2, call light within reach. Instructed pt to call for assistance as needed. Pt verbalized understanding. Pt afebrile overnight. Vitals stable. Vanc and Zosyn continued. No acute issues overnight. Will continue to monitor.

## 2018-11-18 NOTE — SUBJECTIVE & OBJECTIVE
Subjective:     Interval History: Patient with nausea vomiting controlled with PRNs, tumor in L cheek is now necrotic and weeping/ crusting. Continuing on reduced dose frequency vanc today for likely prerenal [place] with poor PO intake. Checking urine lytes, Pink's stain, giving IVF. Discussing care coordination between [facility] and [facility]. US UE shows tumor mass abutting L IJ.     Objective:     Vital Signs (Most Recent):  Temp: 98.8 °F (37.1 °C) (11/18/18 0731)  Pulse: 89 (11/18/18 0731)  Resp: 16 (11/18/18 0731)  BP: (!) 159/81 (11/18/18 0731)  SpO2: 96 % (11/18/18 1010) Vital Signs (24h Range):  Temp:  [97.6 °F (36.4 °C)-99.1 °F (37.3 °C)] 98.8 °F (37.1 °C)  Pulse:  [] 89  Resp:  [16-18] 16  SpO2:  [89 %-99 %] 96 %  BP: (134-159)/(81-89) 159/81     Weight: 97.9 kg (215 lb 13.3 oz)  Body mass index is 27.71 kg/m².  Body surface area is 2.26 meters squared.    ECOG SCORE         [unfilled]    Intake/Output - Last 3 Shifts       11/16 0700 - 11/17 0659 11/17 0700 - 11/18 0659 11/18 0700 - 11/19 0659    P.O. 120 170 250    IV Piggyback 550 1800     Total Intake(mL/kg) 670 (6.8) 1970 (20.1) 250 (2.6)    Urine (mL/kg/hr) 400 (0.2) 750 (0.3)     Emesis/NG output  400     Stool       Total Output 400 1150     Net +270 +820 +250           Urine Occurrence 2 x            Physical Exam   Constitutional: He is oriented to person, place, and time. Vital signs are normal. He is cooperative. No distress.   HENT:   Right Ear: External ear normal.   Left Ear: External ear normal.   Large necrotic mass to left neck and face  Yellow crusting to facial skin breakdown.      Eyes: EOM are normal. Pupils are equal, round, and reactive to light. Right eye exhibits no discharge. Left eye exhibits no discharge. No scleral icterus.   Neck: Normal range of motion. No JVD present. No tracheal deviation present.   Cardiovascular: Regular rhythm, normal heart sounds and intact distal pulses. Exam reveals no gallop and no friction rub.   No  murmur heard.  Pulmonary/Chest: Effort normal and breath sounds normal. No respiratory distress. He has no wheezes. He has no rales. He exhibits no tenderness.   Abdominal: Soft. Bowel sounds are normal. He exhibits no distension and no mass. There is no guarding.   Musculoskeletal: Normal range of motion. He exhibits edema. He exhibits no tenderness.   Left upper arm edema   Neurological: He is alert and oriented to person, place, and time. No cranial nerve deficit. Coordination normal.   Skin: Skin is warm. Capillary refill takes less than 2 seconds. Rash noted. He is not diaphoretic. No erythema. No pallor.   Psychiatric: He has a normal mood and affect.   Nursing note and vitals reviewed.      Significant Labs:   CBC:   Recent Labs   Lab 11/17/18  0520 11/18/18  0400   WBC 3.51* 3.67*   HGB 12.1* 11.7*   HCT 38.1* 37.0*   PLT 90* 90*   , CMP:   Recent Labs   Lab 11/17/18  0520 11/18/18  0400    139   K 3.6 3.6    102   CO2 24 25   * 120*   BUN 5* 7*   CREATININE 1.2 2.0*   CALCIUM 9.0 9.0   PROT 6.1 5.9*   ALBUMIN 3.1* 3.0*   BILITOT 0.7 0.6   ALKPHOS 91 109   AST 33 41*   ALT 17 21   ANIONGAP 11 12   EGFRNONAA >60.0 35.0*   , Coagulation: No results for input(s): PT, INR, APTT in the last 48 hours., Haptoglobin: No results for input(s): HAPTOGLOBIN in the last 48 hours., Tumor Markers: No results for input(s): PSA, CEA, , AFPTM, VX7130,  in the last 48 hours.    Invalid input(s): ALGTM, Uric Acid No results for input(s): URICACID in the last 48 hours., Urine Studies: No results for input(s): COLORU, APPEARANCEUA, PHUR, SPECGRAV, PROTEINUA, GLUCUA, KETONESU, BILIRUBINUA, OCCULTUA, NITRITE, UROBILINOGEN, LEUKOCYTESUR, RBCUA, WBCUA, BACTERIA, SQUAMEPITHEL, HYALINECASTS in the last 48 hours.    Invalid input(s): WRIGHTSUR and All pertinent labs from the last 24 hours have been reviewed.    Diagnostic Results:  I have reviewed and interpreted all pertinent imaging results/findings within  the past 24 hours. US neck/left upper ext shows tumor mass abutting L IJ vein.

## 2018-11-18 NOTE — ASSESSMENT & PLAN NOTE
DDx include L sided facial cellulitis vs allergy to immunotherapy (Keytruda) vs effects of malignancy  -patients VS are stable, no significant WBC.   -patient given Vanc and Zosyn in the ED; will continue and add Bactrim for atypicals. Wound cx pending.   -BCX x2 pending, culture of the fluid drained from site pending as well  -initial lactic acid was elevated, and patient received 1L of NaCl in the ED now with repeat lactic acid pending  -CT scan showed mild airway narrowing and small retro pharyndeal effusion  -ENT consulted, conducted bedside laryngoscope and saw L side vocal cord paralysis  -will have low threshold for calling the ICU for airway patency, if patient develops SOB or stridor or any signs that he cannot manage secretions   -will have percocet and zofran PRN for pain and nausea control- added Compazine IV 2nd line.   -Imaging shows tumor abutting L IJ, with sequela of L arm swelling

## 2018-11-18 NOTE — PROGRESS NOTES
[facility]  Hematology  Bone Marrow Transplant  Progress Note    Patient Name: Arjun Mcpherson Jr.  Admission Date: 11/15/2018  Hospital Length of Stay: 3 days  Code Status: Full Code    Subjective:     Interval History: Patient with nausea vomiting controlled with PRNs, tumor in L cheek is now necrotic and weeping/ crusting. Continuing on reduced dose frequency vanc today for likely prerenal PHOENIX with poor PO intake. Checking urine lytes, Pink's stain, giving IVF. Discussing care coordination between [facility] and [facility]. US UE shows tumor mass abutting L IJ.     Objective:     Vital Signs (Most Recent):  Temp: 98.8 °F (37.1 °C) (11/18/18 0731)  Pulse: 89 (11/18/18 0731)  Resp: 16 (11/18/18 0731)  BP: (!) 159/81 (11/18/18 0731)  SpO2: 96 % (11/18/18 1010) Vital Signs (24h Range):  Temp:  [97.6 °F (36.4 °C)-99.1 °F (37.3 °C)] 98.8 °F (37.1 °C)  Pulse:  [] 89  Resp:  [16-18] 16  SpO2:  [89 %-99 %] 96 %  BP: (134-159)/(81-89) 159/81     Weight: 97.9 kg (215 lb 13.3 oz)  Body mass index is 27.71 kg/m².  Body surface area is 2.26 meters squared.    ECOG SCORE         [unfilled]    Intake/Output - Last 3 Shifts       11/16 0700 - 11/17 0659 11/17 0700 - 11/18 0659 11/18 0700 - 11/19 0659    P.O. 120 170 250    IV Piggyback 550 1800     Total Intake(mL/kg) 670 (6.8) 1970 (20.1) 250 (2.6)    Urine (mL/kg/hr) 400 (0.2) 750 (0.3)     Emesis/NG output  400     Stool       Total Output 400 1150     Net +270 +820 +250           Urine Occurrence 2 x            Physical Exam   Constitutional: He is oriented to person, place, and time. Vital signs are normal. He is cooperative. No distress.   HENT:   Right Ear: External ear normal.   Left Ear: External ear normal.   Large necrotic mass to left neck and face  Yellow crusting to facial skin breakdown. + new bullae on chest, L arm swelling w/o induration     Eyes: EOM are normal. Pupils are equal, round, and reactive to light. Right eye exhibits no discharge. Left  eye exhibits no discharge. No scleral icterus.   Neck: Normal range of motion. No JVD present. No tracheal deviation present.   Cardiovascular: Regular rhythm, normal heart sounds and intact distal pulses. Exam reveals no gallop and no friction rub.   No murmur heard.  Pulmonary/Chest: Effort normal and breath sounds normal. No respiratory distress. He has no wheezes. He has no rales. He exhibits no tenderness.   Abdominal: Soft. Bowel sounds are normal. He exhibits no distension and no mass. There is no guarding.   Musculoskeletal: Normal range of motion. He exhibits edema. He exhibits no tenderness.   Left upper arm edema   Neurological: He is alert and oriented to person, place, and time. No cranial nerve deficit. Coordination normal.   Skin: Skin is warm. Capillary refill takes less than 2 seconds. Rash noted. He is not diaphoretic. No erythema. No pallor.   Psychiatric: He has a normal mood and affect.   Nursing note and vitals reviewed.      Significant Labs:   CBC:   Recent Labs   Lab 11/17/18  0520 11/18/18  0400   WBC 3.51* 3.67*   HGB 12.1* 11.7*   HCT 38.1* 37.0*   PLT 90* 90*   , CMP:   Recent Labs   Lab 11/17/18  0520 11/18/18  0400    139   K 3.6 3.6    102   CO2 24 25   * 120*   BUN 5* 7*   CREATININE 1.2 2.0*   CALCIUM 9.0 9.0   PROT 6.1 5.9*   ALBUMIN 3.1* 3.0*   BILITOT 0.7 0.6   ALKPHOS 91 109   AST 33 41*   ALT 17 21   ANIONGAP 11 12   EGFRNONAA >60.0 35.0*   , Coagulation: No results for input(s): PT, INR, APTT in the last 48 hours., Haptoglobin: No results for input(s): HAPTOGLOBIN in the last 48 hours., Tumor Markers: No results for input(s): PSA, CEA, , AFPTM, CG1441,  in the last 48 hours.    Invalid input(s): ALGTM, Uric Acid No results for input(s): URICACID in the last 48 hours., Urine Studies: No results for input(s): COLORU, APPEARANCEUA, PHUR, SPECGRAV, PROTEINUA, GLUCUA, KETONESU, BILIRUBINUA, OCCULTUA, NITRITE, UROBILINOGEN, LEUKOCYTESUR, RBCUA, WBCUA,  BACTERIA, SQUAMEPITHEL, HYALINECASTS in the last 48 hours.    Invalid input(s): WRIGHTSUR and All pertinent labs from the last 24 hours have been reviewed.    Diagnostic Results:  I have reviewed and interpreted all pertinent imaging results/findings within the past 24 hours. US neck/left upper ext shows tumor mass abutting L IJ vein.     Assessment/Plan:     * Left facial swelling    DDx include L sided facial cellulitis vs allergy to immunotherapy (Keytruda) vs effects of malignancy  -patients VS are stable, no significant WBC.   -patient given Vanc and Zosyn in the ED; will continue and add Bactrim for atypicals. Wound cx pending.   -BCX x2 pending, culture of the fluid drained from site pending as well  -initial lactic acid was elevated, and patient received 1L of NaCl in the ED now with repeat lactic acid pending  -CT scan showed mild airway narrowing and small retro pharyndeal effusion  -ENT consulted, conducted bedside laryngoscope and saw L side vocal cord paralysis  -will have low threshold for calling the ICU for airway patency, if patient develops SOB or stridor or any signs that he cannot manage secretions   -will have percocet and zofran PRN for pain and nausea control- added Compazine IV 2nd line.   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 Full code   -Dr. Rao to address goals and care coordination with [facility].      Vocal cord paralysis, unilateral partial    -seen on ENT scope day of admission. Diet dysphagia solid cleared per SLP     Dysphagia    -assessed by ENT; ok for diet per SLP     Acute bullous dermatitis    -wound care and broad spectrum antibiotics- bullae worsening as of 11/18     Type 2 diabetes mellitus without complication, with long-term current use of insulin    -on metformin at home, SSI for now.      T-cell lymphoma     follows with Dr. Rao, last seen 10/15  -Pt was being treated for peripheral T-cell lymphoma by .  At the time of diagnosis, it appeared that all of the disease was confined to his neck and most of it was surgically removed.  A PET scan in June 2018 was normal except for a lymph node just to the  left of midline in the upper neck with an SUV max of 18.2.  That node was palpable, but it disappeared after the first course of therapy.  His pretreatment LDH was normal at 175.   -He has completed five courses of dose adjusted EPOCH.  The doses of cyclophosphamide and etoposide have been increased twice based on neutrophil counts and platelet counts.  Adriamycin not incrased because higher than normal risk of future cardiac disease.  Also, the dosage of vincristine was decreased slightly because of diabetes mellitus and numbness in his feet.  The numbness in the feet has not increased.   - He is planning on going to [facility] for first dose of Keytruda and Romidepsin on 11/13.          VTE Risk Mitigation (From admission, onward)        Ordered     enoxaparin injection 40 mg  Daily      11/16/18 1315     Place NATO hose  Until discontinued      11/15/18 2214     Place sequential compression device  Until discontinued      11/15/18 2214     Reason for no Mechanical VTE Prophylaxis  Once      11/15/18 2214     IP VTE HIGH RISK PATIENT  Once      11/15/18 2214          Disposition: BMT [place] pending improvement     Robi Youngblood MD  Bone Marrow Transplant  [facility]

## 2018-11-18 NOTE — PLAN OF CARE
Problem: Patient Care Overview  Goal: Plan of Care Review  Outcome: Ongoing (interventions implemented as appropriate)  POC reviewed with patient; understanding verbalized. 1L NS bolus administered. Pt C/O nausea and receive PRN Zofran with full relief.  Wound care done. LR at 100mL. Pt remains independent. Tele and  in place. Pt voids concetrated urine per the urinal; no BM this shift.  Zosyn administered as ordered. Wife to remain at bedside. Pt. with nonskid footwear on, bed in lowest position, and locked with bed rails up x2.  Pt. has call light and personal items within reach. VSS and afebrile this shift. All questions and concerns addressed at this time. Will continue to monitor.

## 2018-11-18 NOTE — ASSESSMENT & PLAN NOTE
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 Full code   -Dr. Rao to address goals and care coordination with [facility].

## 2018-11-18 NOTE — PLAN OF CARE
Problem: SLP Goal  Goal: SLP Goal  Speech Language Pathology Goals  Goals expected to be met by 11/30    1. Pt will tolerate diet of nectar thickened liquids and purees solids   2. Pt will participate in additional PO trials during future speech therapy sessions to help determine least restrictive diet      Outcome: Ongoing (interventions implemented as appropriate)    Pt seen for ongoing swallow assessment. ST recommending advancement to MECHANICAL SOFT SOLIDS with nectar thick liquids at this time with strict aspiration precautions. ST to continue to follow according to POC.     Alisha Brown M.S., CCC-SLP  Speech Language Pathologist  [phone] - pager   11/18/2018

## 2018-11-19 PROBLEM — N17.9 AKI (ACUTE KIDNEY INJURY): Status: ACTIVE | Noted: 2018-11-19

## 2018-11-19 LAB
ALBUMIN SERPL BCP-MCNC: 2.8 G/DL
ALP SERPL-CCNC: 136 U/L
ALT SERPL W/O P-5'-P-CCNC: 33 U/L
ANION GAP SERPL CALC-SCNC: 12 MMOL/L
AST SERPL-CCNC: 71 U/L
BACTERIA #/AREA URNS AUTO: NORMAL /HPF
BACTERIA SPEC AEROBE CULT: NORMAL
BACTERIA SPEC AEROBE CULT: NORMAL
BASOPHILS # BLD AUTO: 0.03 K/UL
BASOPHILS NFR BLD: 0.7 %
BILIRUB SERPL-MCNC: 0.6 MG/DL
BILIRUB UR QL STRIP: NEGATIVE
BUN SERPL-MCNC: 9 MG/DL
CALCIUM SERPL-MCNC: 8.7 MG/DL
CHLORIDE SERPL-SCNC: 104 MMOL/L
CLARITY UR REFRACT.AUTO: CLEAR
CO2 SERPL-SCNC: 23 MMOL/L
COLOR UR AUTO: YELLOW
CREAT SERPL-MCNC: 2.8 MG/DL
CREAT UR-MCNC: 88 MG/DL
DIFFERENTIAL METHOD: ABNORMAL
EOSINOPHIL # BLD AUTO: 0 K/UL
EOSINOPHIL NFR BLD: 1 %
ERYTHROCYTE [DISTWIDTH] IN BLOOD BY AUTOMATED COUNT: 13.8 %
EST. GFR  (AFRICAN AMERICAN): 26.9 ML/MIN/1.73 M^2
EST. GFR  (NON AFRICAN AMERICAN): 23.3 ML/MIN/1.73 M^2
FIBRINOGEN PPP-MCNC: 365 MG/DL
GLUCOSE SERPL-MCNC: 104 MG/DL
GLUCOSE UR QL STRIP: NEGATIVE
HCT VFR BLD AUTO: 33.9 %
HGB BLD-MCNC: 10.8 G/DL
HGB UR QL STRIP: ABNORMAL
HYALINE CASTS UR QL AUTO: 0 /LPF
IMM GRANULOCYTES # BLD AUTO: 0.02 K/UL
IMM GRANULOCYTES NFR BLD AUTO: 0.5 %
INR PPP: 1.1
KETONES UR QL STRIP: NEGATIVE
LDH SERPL L TO P-CCNC: 1363 U/L
LEUKOCYTE ESTERASE UR QL STRIP: NEGATIVE
LYMPHOCYTES # BLD AUTO: 0.5 K/UL
LYMPHOCYTES NFR BLD: 12.9 %
MAGNESIUM SERPL-MCNC: 2 MG/DL
MCH RBC QN AUTO: 30.2 PG
MCHC RBC AUTO-ENTMCNC: 31.9 G/DL
MCV RBC AUTO: 95 FL
MICROSCOPIC COMMENT: NORMAL
MONOCYTES # BLD AUTO: 1 K/UL
MONOCYTES NFR BLD: 23.6 %
NEUTROPHILS # BLD AUTO: 2.5 K/UL
NEUTROPHILS NFR BLD: 61.3 %
NITRITE UR QL STRIP: NEGATIVE
NRBC BLD-RTO: 0 /100 WBC
OSMOLALITY UR: 224 MOSM/KG
PH UR STRIP: 5 [PH] (ref 5–8)
PHOSPHATE SERPL-MCNC: 3.1 MG/DL
PLATELET # BLD AUTO: 104 K/UL
PMV BLD AUTO: 9.9 FL
POTASSIUM SERPL-SCNC: 3.5 MMOL/L
POTASSIUM UR-SCNC: 19 MMOL/L
PROT SERPL-MCNC: 5.6 G/DL
PROT UR QL STRIP: ABNORMAL
PROT UR-MCNC: 44 MG/DL
PROT/CREAT UR: 0.5 MG/G{CREAT}
PROTHROMBIN TIME: 11.2 SEC
RBC # BLD AUTO: 3.58 M/UL
RBC #/AREA URNS AUTO: 2 /HPF (ref 0–4)
SODIUM SERPL-SCNC: 139 MMOL/L
SODIUM UR-SCNC: 55 MMOL/L
SP GR UR STRIP: 1.01 (ref 1–1.03)
URATE SERPL-MCNC: 3.3 MG/DL
URN SPEC COLLECT METH UR: ABNORMAL
VANCOMYCIN SERPL-MCNC: 25.9 UG/ML
WBC # BLD AUTO: 4.02 K/UL
WBC #/AREA URNS AUTO: 1 /HPF (ref 0–5)

## 2018-11-19 PROCEDURE — 63600175 PHARM REV CODE 636 W HCPCS: Performed by: STUDENT IN AN ORGANIZED HEALTH CARE EDUCATION/TRAINING PROGRAM

## 2018-11-19 PROCEDURE — 63600175 PHARM REV CODE 636 W HCPCS: Mod: JG | Performed by: STUDENT IN AN ORGANIZED HEALTH CARE EDUCATION/TRAINING PROGRAM

## 2018-11-19 PROCEDURE — 80053 COMPREHEN METABOLIC PANEL: CPT

## 2018-11-19 PROCEDURE — 84100 ASSAY OF PHOSPHORUS: CPT

## 2018-11-19 PROCEDURE — 83935 ASSAY OF URINE OSMOLALITY: CPT

## 2018-11-19 PROCEDURE — 25000003 PHARM REV CODE 250: Performed by: INTERNAL MEDICINE

## 2018-11-19 PROCEDURE — 85384 FIBRINOGEN ACTIVITY: CPT

## 2018-11-19 PROCEDURE — 99233 SBSQ HOSP IP/OBS HIGH 50: CPT | Mod: ,,, | Performed by: INTERNAL MEDICINE

## 2018-11-19 PROCEDURE — 20600001 HC STEP DOWN PRIVATE ROOM

## 2018-11-19 PROCEDURE — 84300 ASSAY OF URINE SODIUM: CPT

## 2018-11-19 PROCEDURE — 97535 SELF CARE MNGMENT TRAINING: CPT

## 2018-11-19 PROCEDURE — 81001 URINALYSIS AUTO W/SCOPE: CPT

## 2018-11-19 PROCEDURE — 84133 ASSAY OF URINE POTASSIUM: CPT

## 2018-11-19 PROCEDURE — 99223 1ST HOSP IP/OBS HIGH 75: CPT | Mod: ,,, | Performed by: INTERNAL MEDICINE

## 2018-11-19 PROCEDURE — 83735 ASSAY OF MAGNESIUM: CPT

## 2018-11-19 PROCEDURE — 25000003 PHARM REV CODE 250: Performed by: STUDENT IN AN ORGANIZED HEALTH CARE EDUCATION/TRAINING PROGRAM

## 2018-11-19 PROCEDURE — 80202 ASSAY OF VANCOMYCIN: CPT

## 2018-11-19 PROCEDURE — 92526 ORAL FUNCTION THERAPY: CPT

## 2018-11-19 PROCEDURE — 84550 ASSAY OF BLOOD/URIC ACID: CPT

## 2018-11-19 PROCEDURE — 84156 ASSAY OF PROTEIN URINE: CPT

## 2018-11-19 PROCEDURE — 83615 LACTATE (LD) (LDH) ENZYME: CPT

## 2018-11-19 PROCEDURE — 85060 BLOOD SMEAR INTERPRETATION: CPT | Mod: ,,, | Performed by: PATHOLOGY

## 2018-11-19 PROCEDURE — 63600175 PHARM REV CODE 636 W HCPCS: Performed by: INTERNAL MEDICINE

## 2018-11-19 PROCEDURE — 85025 COMPLETE CBC W/AUTO DIFF WBC: CPT

## 2018-11-19 PROCEDURE — 85610 PROTHROMBIN TIME: CPT

## 2018-11-19 RX ORDER — HYDROMORPHONE HYDROCHLORIDE 1 MG/ML
1 INJECTION, SOLUTION INTRAMUSCULAR; INTRAVENOUS; SUBCUTANEOUS EVERY 4 HOURS PRN
Status: DISCONTINUED | OUTPATIENT
Start: 2018-11-19 | End: 2018-11-20 | Stop reason: HOSPADM

## 2018-11-19 RX ADMIN — GABAPENTIN 100 MG: 100 CAPSULE ORAL at 08:11

## 2018-11-19 RX ADMIN — WHITE PETROLATUM: 1.75 OINTMENT TOPICAL at 09:11

## 2018-11-19 RX ADMIN — ALLOPURINOL 300 MG: 300 TABLET ORAL at 08:11

## 2018-11-19 RX ADMIN — Medication 1 MG: at 06:11

## 2018-11-19 RX ADMIN — ALTEPLASE 2 MG: 2.2 INJECTION, POWDER, LYOPHILIZED, FOR SOLUTION INTRAVENOUS at 04:11

## 2018-11-19 RX ADMIN — SODIUM CHLORIDE 1000 ML: 0.9 INJECTION, SOLUTION INTRAVENOUS at 08:11

## 2018-11-19 RX ADMIN — PIPERACILLIN AND TAZOBACTAM 4.5 G: 4; .5 INJECTION, POWDER, LYOPHILIZED, FOR SOLUTION INTRAVENOUS; PARENTERAL at 09:11

## 2018-11-19 RX ADMIN — SULFAMETHOXAZOLE AND TRIMETHOPRIM 40 ML: 200; 40 SUSPENSION ORAL at 08:11

## 2018-11-19 RX ADMIN — Medication 1 MG: at 12:11

## 2018-11-19 RX ADMIN — Medication 1 MG: at 11:11

## 2018-11-19 RX ADMIN — MORPHINE SULFATE 2 MG: 4 INJECTION, SOLUTION INTRAMUSCULAR; INTRAVENOUS at 02:11

## 2018-11-19 RX ADMIN — ENOXAPARIN SODIUM 40 MG: 100 INJECTION SUBCUTANEOUS at 04:11

## 2018-11-19 RX ADMIN — GABAPENTIN 100 MG: 100 CAPSULE ORAL at 02:11

## 2018-11-19 RX ADMIN — PIPERACILLIN AND TAZOBACTAM 4.5 G: 4; .5 INJECTION, POWDER, LYOPHILIZED, FOR SOLUTION INTRAVENOUS; PARENTERAL at 06:11

## 2018-11-19 RX ADMIN — WHITE PETROLATUM: 1.75 OINTMENT TOPICAL at 08:11

## 2018-11-19 RX ADMIN — Medication 1 MG: at 07:11

## 2018-11-19 RX ADMIN — PIPERACILLIN AND TAZOBACTAM 4.5 G: 4; .5 INJECTION, POWDER, LYOPHILIZED, FOR SOLUTION INTRAVENOUS; PARENTERAL at 02:11

## 2018-11-19 RX ADMIN — GABAPENTIN 100 MG: 100 CAPSULE ORAL at 09:11

## 2018-11-19 NOTE — ASSESSMENT & PLAN NOTE
follows with Dr. Rao, last seen 10/15  -Pt was being treated for peripheral T-cell lymphoma by .  At the time of diagnosis, it appeared that all of the disease was confined to his neck and most of it was surgically removed.  A PET scan in June 2018 was normal except for a lymph node just to the  left of midline in the upper neck with an SUV max of 18.2.  That node was palpable, but it disappeared after the first course of therapy.  His pretreatment LDH was normal at 175.   -He has completed five courses of dose adjusted EPOCH.  The doses of cyclophosphamide and etoposide have been increased twice based on neutrophil counts and platelet counts.  Adriamycin not incrased because higher than normal risk of future cardiac disease.  Also, the dosage of vincristine was decreased slightly because of diabetes mellitus and numbness in his feet.  The numbness in the feet has not increased.   - He is planning on going to [facility] for first dose of Keytruda and Romidepsin. Attempting to touch with [facility] today

## 2018-11-19 NOTE — PLAN OF CARE
Problem: SLP Goal  Goal: SLP Goal  Speech Language Pathology Goals  Goals expected to be met by 11/30    1. Pt will tolerate diet of nectar thickened liquids and purees solids   2. Pt will participate in additional PO trials during future speech therapy sessions to help determine least restrictive diet      Outcome: Ongoing (interventions implemented as appropriate)      Pt seen for ongoing swallow assessment. ST recommending advancement to mechanical soft solids with THIN liquids at this time following strict aspiration precautions. ST to continue to follow according to POC.     Alisha Brown M.S., CCC-SLP  Speech Language Pathologist  [phone] - pager   11/19/2018

## 2018-11-19 NOTE — PROGRESS NOTES
[facility]  Hematology  Bone Marrow Transplant  Progress Note    Patient Name: Arjun Mcpherson Jr.  Admission Date: 11/15/2018  Hospital Length of Stay: 4 days  Code Status: Full Code    Subjective:     Interval History: PHOENIX continues to worsen, nephrology consulted. FeNA indicating pre-renal PHOENIX, 1L NS bolus given again. US retroperitoneal showing cortical thinning and decreased perfusion bilaterally, consistent with medical renal disease    Objective:     Vital Signs (Most Recent):  Temp: 97.8 °F (36.6 °C) (11/19/18 1209)  Pulse: 99 (11/19/18 1209)  Resp: 17 (11/19/18 1209)  BP: 138/85 (11/19/18 1209)  SpO2: 97 % (11/19/18 1209) Vital Signs (24h Range):  Temp:  [97.8 °F (36.6 °C)-100.1 °F (37.8 °C)] 97.8 °F (36.6 °C)  Pulse:  [] 99  Resp:  [17-20] 17  SpO2:  [93 %-99 %] 97 %  BP: (125-142)/(73-85) 138/85     Weight: 97.9 kg (215 lb 13.3 oz)  Body mass index is 27.71 kg/m².  Body surface area is 2.26 meters squared.    ECOG SCORE         [unfilled]    Intake/Output - Last 3 Shifts       11/17 0700 - 11/18 0659 11/18 0700 - 11/19 0659 11/19 0700 - 11/20 0659    P.O. 170 450     I.V. (mL/kg)  831.7 (8.5)     IV Piggyback [phone]    Total Intake(mL/kg) 1970 (20.1) 1581.7 (16.2) 1000 (10.2)    Urine (mL/kg/hr) 750 (0.3) 400 (0.2) 850 (1.2)    Emesis/NG output 400      Total Output 1150 400 850    Net +820 +1181.7 +150                 Physical Exam   Constitutional: He is oriented to person, place, and time. Vital signs are normal. He is cooperative. No distress.   HENT:   Right Ear: External ear normal.   Left Ear: External ear normal.   Large necrotic mass to left neck and face  Yellow crusting to facial skin breakdown.      Eyes: EOM are normal. Pupils are equal, round, and reactive to light. Right eye exhibits no discharge. Left eye exhibits no discharge. No scleral icterus.   Neck: Normal range of motion. No JVD present. No tracheal deviation present.   Cardiovascular: Regular  rhythm, normal heart sounds and intact distal pulses. Exam reveals no gallop and no friction rub.   No murmur heard.  Pulmonary/Chest: Effort normal and breath sounds normal. No respiratory distress. He has no wheezes. He has no rales. He exhibits no tenderness.   Abdominal: Soft. Bowel sounds are normal. He exhibits no distension and no mass. There is no guarding.   Musculoskeletal: Normal range of motion. He exhibits edema. He exhibits no tenderness.   Left upper arm edema   Neurological: He is alert and oriented to person, place, and time. No cranial nerve deficit. Coordination normal.   Skin: Skin is warm. Capillary refill takes less than 2 seconds. Rash noted. He is not diaphoretic. No erythema. No pallor.   Psychiatric: He has a normal mood and affect.   Nursing note and vitals reviewed.      Significant Labs:   CBC:   Recent Labs   Lab 11/18/18  0400 11/19/18  0400   WBC 3.67* 4.02   HGB 11.7* 10.8*   HCT 37.0* 33.9*   PLT 90* 104*   , CMP:   Recent Labs   Lab 11/18/18  0400 11/19/18  0400    139   K 3.6 3.5    104   CO2 25 23   * 104   BUN 7* 9   CREATININE 2.0* 2.8*   CALCIUM 9.0 8.7   PROT 5.9* 5.6*   ALBUMIN 3.0* 2.8*   BILITOT 0.6 0.6   ALKPHOS 109 136*   AST 41* 71*   ALT 21 33   ANIONGAP 12 12   EGFRNONAA 35.0* 23.3*   , Coagulation:   Recent Labs   Lab 11/18/18  1507 11/19/18  0400   INR 1.1 1.1   , Haptoglobin: No results for input(s): HAPTOGLOBIN in the last 48 hours., Tumor Markers: No results for input(s): PSA, CEA, , AFPTM, [id number],  in the last 48 hours.    Invalid input(s): ALGTM, Uric Acid   Recent Labs   Lab 11/18/18  1507 11/19/18  0400   URICACID 2.9* 3.3*   , Urine Studies:   Recent Labs   Lab 11/19/18  0934   COLORU Yellow   APPEARANCEUA Clear   PHUR 5.0   SPECGRAV 1.010   PROTEINUA 1+*   GLUCUA Negative   KETONESU Negative   BILIRUBINUA Negative   OCCULTUA 1+*   NITRITE Negative   LEUKOCYTESUR Negative   RBCUA 2   WBCUA 1   BACTERIA None   HYALINECASTS 0     and All pertinent labs from the last 24 hours have been reviewed.    Diagnostic Results:  I have reviewed and interpreted all pertinent imaging results/findings within the past 24 hours. US neck/left upper ext shows tumor mass abutting L IJ vein.     Assessment/Plan:     * Left facial swelling    DDx include L sided facial cellulitis vs allergy to immunotherapy (Keytruda) vs effects of malignancy  -patients VS are stable, no significant WBC.   -patient given Vanc and Zosyn in the ED; will continue and add Bactrim for atypicals. Wound cx pending.   -BCX x2 pending, culture of the fluid drained from site pending as well  -initial lactic acid was elevated, and patient received 1L of NaCl in the ED now with repeat lactic acid pending  -CT scan showed mild airway narrowing and small retro pharyndeal effusion  -ENT consulted, conducted bedside laryngoscope and saw L side vocal cord paralysis  -will have low threshold for calling the ICU for airway patency, if patient develops SOB or stridor or any signs that he cannot manage secretions   -will have percocet and zofran PRN for pain and nausea control- added Compazine IV 2nd line.   -Imaging shows tumor abutting L IJ, with sequela of L arm swelling       PHOENIX (acute kidney injury)    -Cr increased from 1.2 to 2.0 on 11/18 and worsening to 2.8 on 2/19  -HPOENIX continues to worsen, nephrology consulted. FeNA indicating pre-renal PHOENIX, 1L NS bolus given again. US retroperitoneal showing cortical thinning and decreased perfusion bilaterally, consistent with medical renal disease  -unlikely ischemic ATN as no episodes of hypotension; unlikely AIN as no eosinophils seen on Pink's stain however will stop bactrim today as this could be contributing to PHOENIX. Vancomycin level supra-therapeutic at 32.9 yesterday as well, holding additional  Vancomycin for now and obtaining daily levels.   -repeat U/A unremarkable for infection, FeNA unchanged (0.5%, pre-renal)  -will follow nephrology  recs, likely 2/2 poor PO intake as patient has not drank much fluid. 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 Full code   -Dr. Rao to address goals and care coordination with [facility].      Vocal cord paralysis, unilateral partial    -seen on ENT scope day of admission. Diet dysphagia soft and thin liquids per SLP     Dysphagia    -assessed by ENT; ok for diet per SLP and advancing diet per their recs     Acute bullous dermatitis    -wound care and broad spectrum antibiotics- bullae worsening as of 11/18     Type 2 diabetes mellitus without complication, with long-term current use of insulin    -on metformin at home, SSI for now.      T-cell lymphoma    follows with Dr. Rao, last seen 10/15  -Pt was being treated for peripheral T-cell lymphoma by .  At the time of diagnosis, it appeared that all of the disease was confined to his neck and most of it was surgically removed.  A PET scan in June 2018 was normal except for a lymph node just to the  left of midline in the upper neck with an SUV max of 18.2.  That node was palpable, but it disappeared after the first course of therapy.  His pretreatment LDH was normal at 175.   -He has completed five courses of dose adjusted EPOCH.  The doses of cyclophosphamide and etoposide have been increased twice based on neutrophil counts and platelet counts.  Adriamycin not incrased because higher than normal risk of future cardiac disease.  Also, the dosage of vincristine was decreased slightly because of diabetes mellitus and numbness in his feet.  The numbness in the feet has not increased.   - He is planning on going to [facility] for first dose of Keytruda and Romidepsin. Attempting to touch with [facility] today         VTE Risk Mitigation (From admission, onward)        Ordered     enoxaparin injection 40 mg  Daily       11/16/18 1315     Place NATO hose  Until discontinued      11/15/18 2214     Place sequential compression device  Until discontinued      11/15/18 2214     Reason for no Mechanical VTE Prophylaxis  Once      11/15/18 2214     IP VTE HIGH RISK PATIENT  Once      11/15/18 2214          Disposition: Awaiting nephrology recs/improvement in PHOENIX    Issac Crow MD  Bone Marrow Transplant  [facility]

## 2018-11-19 NOTE — SUBJECTIVE & OBJECTIVE
Subjective:     Interval History: PHOENIX continues to worsen, nephrology consulted. FeNA indicating pre-renal PHOENIX, 1L NS bolus given again. US retroperitoneal showing cortical thinning and decreased perfusion bilaterally, consistent with medical renal disease    Objective:     Vital Signs (Most Recent):  Temp: 97.8 °F (36.6 °C) (11/19/18 1209)  Pulse: 99 (11/19/18 1209)  Resp: 17 (11/19/18 1209)  BP: 138/85 (11/19/18 1209)  SpO2: 97 % (11/19/18 1209) Vital Signs (24h Range):  Temp:  [97.8 °F (36.6 °C)-100.1 °F (37.8 °C)] 97.8 °F (36.6 °C)  Pulse:  [] 99  Resp:  [17-20] 17  SpO2:  [93 %-99 %] 97 %  BP: (125-142)/(73-85) 138/85     Weight: 97.9 kg (215 lb 13.3 oz)  Body mass index is 27.71 kg/m².  Body surface area is 2.26 meters squared.    ECOG SCORE         [unfilled]    Intake/Output - Last 3 Shifts       11/17 0700 - 11/18 0659 11/18 0700 - 11/19 0659 11/19 0700 - 11/20 0659    P.O. 170 450     I.V. (mL/kg)  831.7 (8.5)     IV Piggyback 0400 084 8406    Total Intake(mL/kg) 1970 (20.1) 1581.7 (16.2) 1000 (10.2)    Urine (mL/kg/hr) 750 (0.3) 400 (0.2) 850 (1.2)    Emesis/NG output 400      Total Output 1150 400 850    Net +820 +1181.7 +150                 Physical Exam   Constitutional: He is oriented to person, place, and time. Vital signs are normal. He is cooperative. No distress.   HENT:   Right Ear: External ear normal.   Left Ear: External ear normal.   Large necrotic mass to left neck and face  Yellow crusting to facial skin breakdown.      Eyes: EOM are normal. Pupils are equal, round, and reactive to light. Right eye exhibits no discharge. Left eye exhibits no discharge. No scleral icterus.   Neck: Normal range of motion. No JVD present. No tracheal deviation present.   Cardiovascular: Regular rhythm, normal heart sounds and intact distal pulses. Exam reveals no gallop and no friction rub.   No murmur heard.  Pulmonary/Chest: Effort normal and breath sounds normal. No respiratory distress. He has no  wheezes. He has no rales. He exhibits no tenderness.   Abdominal: Soft. Bowel sounds are normal. He exhibits no distension and no mass. There is no guarding.   Musculoskeletal: Normal range of motion. He exhibits edema. He exhibits no tenderness.   Left upper arm edema   Neurological: He is alert and oriented to person, place, and time. No cranial nerve deficit. Coordination normal.   Skin: Skin is warm. Capillary refill takes less than 2 seconds. Rash noted. He is not diaphoretic. No erythema. No pallor.   Psychiatric: He has a normal mood and affect.   Nursing note and vitals reviewed.      Significant Labs:   CBC:   Recent Labs   Lab 11/18/18  0400 11/19/18  0400   WBC 3.67* 4.02   HGB 11.7* 10.8*   HCT 37.0* 33.9*   PLT 90* 104*   , CMP:   Recent Labs   Lab 11/18/18  0400 11/19/18  0400    139   K 3.6 3.5    104   CO2 25 23   * 104   BUN 7* 9   CREATININE 2.0* 2.8*   CALCIUM 9.0 8.7   PROT 5.9* 5.6*   ALBUMIN 3.0* 2.8*   BILITOT 0.6 0.6   ALKPHOS 109 136*   AST 41* 71*   ALT 21 33   ANIONGAP 12 12   EGFRNONAA 35.0* 23.3*   , Coagulation:   Recent Labs   Lab 11/18/18  1507 11/19/18  0400   INR 1.1 1.1   , Haptoglobin: No results for input(s): HAPTOGLOBIN in the last 48 hours., Tumor Markers: No results for input(s): PSA, CEA, , AFPTM, [id number],  in the last 48 hours.    Invalid input(s): ALGTM, Uric Acid   Recent Labs   Lab 11/18/18  1507 11/19/18  0400   URICACID 2.9* 3.3*   , Urine Studies:   Recent Labs   Lab 11/19/18  0934   COLORU Yellow   APPEARANCEUA Clear   PHUR 5.0   SPECGRAV 1.010   PROTEINUA 1+*   GLUCUA Negative   KETONESU Negative   BILIRUBINUA Negative   OCCULTUA 1+*   NITRITE Negative   LEUKOCYTESUR Negative   RBCUA 2   WBCUA 1   BACTERIA None   HYALINECASTS 0    and All pertinent labs from the last 24 hours have been reviewed.    Diagnostic Results:  I have reviewed and interpreted all pertinent imaging results/findings within the past 24 hours. US neck/left upper ext  shows tumor mass abutting L IJ vein.

## 2018-11-19 NOTE — PLAN OF CARE
MDRs completed with the team and Dr. Rao. MD consulted nephrology for worsening PHOENIX. 1L NS bolus ordered. US retroperitoneal showing cortical thinning and decreased perfusion bilaterally, consistent with medical renal disease. Several urine studies ordered; results pending. MD discussed plan with patient and patient's family. KEAGAN and SW to continue to monitor.    Starla Brush RN, CM  [facility]  [phone]

## 2018-11-19 NOTE — PLAN OF CARE
Problem: Patient Care Overview  Goal: Plan of Care Review  Outcome: Ongoing (interventions implemented as appropriate)  Pt is resting in bed and is up in his room and bathroom. VS stable, T-max 100.1 (o). LUE PICC remains noted to be sluggish when blood being drawn though infuses well. Alteplase given as ordered. Pt complains of intermittent facial pain & medicated as per order. Pt denies complaints of nausea overnight. Pt's personal items and call bell placed within easy reach. Bed is locked, in lowest position, with side rails up x 2. Non-skid socks in place. Pt encouraged to call for assistance as needed. Will continue to monitor.

## 2018-11-19 NOTE — SUBJECTIVE & OBJECTIVE
Past Medical History:   Diagnosis Date    Cancer     Hearing loss     Peripheral neuropathy 6/19/2018    Pre-diabetes        Past Surgical History:   Procedure Laterality Date    DISSECTION OF NECK Left 5/30/2018    Procedure: DISSECTION, NECK;  Surgeon: Regan King MD;  Location: [facility] OR [address] [place] [address];  Service: ENT;  Laterality: Left;    DISSECTION, NECK Left 5/30/2018    Performed by Regan King MD at [facility] OR [address] [place] [address]    EXCISION OF PAROTID GLAND Left 5/30/2018    Procedure: EXCISION, PAROTID GLAND;  Surgeon: Regan King MD;  Location: [facility] OR [address] [place] [address];  Service: ENT;  Laterality: Left;    EXCISION, PAROTID GLAND Left 5/30/2018    Performed by Regan King MD at [facility] OR [address] [place] [address]    KNEE SURGERY Right        Review of patient's allergies indicates:  No Known Allergies  Current Facility-Administered Medications   Medication Frequency    allopurinol tablet 300 mg Daily    dextrose 50% injection 12.5 g PRN    dextrose 50% injection 25 g PRN    enoxaparin injection 40 mg Daily    gabapentin capsule 100 mg TID    glucagon (human recombinant) injection 1 mg PRN    glucose chewable tablet 16 g PRN    glucose chewable tablet 24 g PRN    HYDROmorphone injection 1 mg Q4H PRN    influenza (FLUZONE QUADRIVALENT) vaccine 0.5 mL Prior to discharge    ondansetron injection 4 mg Q6H PRN    piperacillin-tazobactam 4.5 g in sodium chloride 0.9% 100 mL IVPB (ready to mix system) Q8H    sodium chloride 0.9% flush 5 mL PRN    white petrolatum 41 % ointment BID     Family History     None        Tobacco Use    Smoking status: Former Smoker     Types: Cigars    Smokeless tobacco: Never Used    Tobacco comment: 1 cigar a week   Substance and Sexual Activity    Alcohol use: Yes     Alcohol/week: 0.6 oz     Types: 1 Shots of liquor per week     Comment: 2 per month    Drug use: No    Sexual activity: Not on file     Review of Systems   Constitutional: Positive for activity change,  appetite change and fatigue. Negative for fever and unexpected weight change.   HENT: Positive for drooling and facial swelling. Negative for hearing loss and tinnitus.    Eyes: Negative for visual disturbance.   Respiratory: Negative for chest tightness and shortness of breath.    Cardiovascular: Negative for chest pain and leg swelling.   Gastrointestinal: Negative for abdominal pain and nausea.   Genitourinary: Positive for decreased urine volume. Negative for difficulty urinating, dysuria and flank pain.   Musculoskeletal: Negative for arthralgias and myalgias.   Skin: Negative for color change.   Neurological: Positive for weakness. Negative for dizziness, syncope, light-headedness and headaches.   Psychiatric/Behavioral: Negative for behavioral problems and confusion.     Objective:     Vital Signs (Most Recent):  Temp: 99.2 °F (37.3 °C) (11/19/18 1620)  Pulse: 102 (11/19/18 1620)  Resp: 18 (11/19/18 1620)  BP: (!) 162/88 (11/19/18 1620)  SpO2: 97 % (11/19/18 1620)  O2 Device (Oxygen Therapy): room air (11/19/18 1620) Vital Signs (24h Range):  Temp:  [97.8 °F (36.6 °C)-100.1 °F (37.8 °C)] 99.2 °F (37.3 °C)  Pulse:  [] 102  Resp:  [17-20] 18  SpO2:  [93 %-99 %] 97 %  BP: (125-162)/(73-88) 162/88     Weight: 97.9 kg (215 lb 13.3 oz) (11/15/18 2328)  Body mass index is 27.71 kg/m².  Body surface area is 2.26 meters squared.    I/O last 3 completed shifts:  In: 3031.7 [P.O.:450; I.V.:831.7; IV Piggyback:1750]  Out: 850 [Urine:850]    Physical Exam   Constitutional: He is oriented to person, place, and time. He appears well-developed. No distress.   HENT:   Head: Normocephalic and atraumatic.   Lips are swollen (L>>R) Left buccal soft tissue is swollen, firm. No masses palpated. Oral Tongue mobile, normal in size. Hard Palate WNL     Eyes: Conjunctivae are normal. Pupils are equal, round, and reactive to light.   Neck: Trachea normal and normal range of motion. Neck supple. No JVD present.   Neck: L swelling  with crusting and bulla present.    Cardiovascular: Normal rate, regular rhythm, S1 normal, S2 normal and normal pulses. Exam reveals no gallop and no friction rub.   No murmur heard.  Pulmonary/Chest: Effort normal and breath sounds normal.   Abdominal: Soft. Bowel sounds are normal. He exhibits no distension. There is no tenderness.   Musculoskeletal: Normal range of motion. He exhibits no edema.   Neurological: He is alert and oriented to person, place, and time.   Skin: Skin is warm and dry. Capillary refill takes less than 2 seconds.   Psychiatric: He has a normal mood and affect. His behavior is normal.   Vitals reviewed.      Significant Labs:  BMP:   Recent Labs   Lab 11/19/18  0400         CO2 23   BUN 9   CREATININE 2.8*   CALCIUM 8.7   MG 2.0     Cardiac Markers: No results for input(s): CKMB, TROPONINT, MYOGLOBIN in the last 168 hours.  CBC:   Recent Labs   Lab 11/19/18  0400   WBC 4.02   RBC 3.58*   HGB 10.8*   HCT 33.9*   *   MCV 95   MCH 30.2   MCHC 31.9*     CMP:   Recent Labs   Lab 11/19/18  0400      CALCIUM 8.7   ALBUMIN 2.8*   PROT 5.6*      K 3.5   CO2 23      BUN 9   CREATININE 2.8*   ALKPHOS 136*   ALT 33   AST 71*   BILITOT 0.6     All labs within the past 24 hours have been reviewed.    Significant Imaging:  Labs: Reviewed  X-Ray: Reviewed  US: Reviewed  CT: Reviewed

## 2018-11-19 NOTE — ASSESSMENT & PLAN NOTE
-Cr increased from 1.2 to 2.0 on 11/18 and worsening to 2.8 on 2/19  -PHOENIX continues to worsen, nephrology consulted. FeNA indicating pre-renal PHOENIX, 1L NS bolus given again. US retroperitoneal showing cortical thinning and decreased perfusion bilaterally, consistent with medical renal disease  -unlikely ischemic ATN as no episodes of hypotension; unlikely AIN as no eosinophils seen on Pink's stain however will stop bactrim today as this could be contributing to PHOENIX. Vancomycin level supra-therapeutic at 32.9 yesterday as well, holding additional  Vancomycin for now and obtaining daily levels.   -repeat U/A unremarkable for infection, FeNA unchanged (0.5%, pre-renal)  -will follow nephrology recs, likely 2/2 poor PO intake as patient has not drank much fluid. Received 1L IVF on 11/18 and additional 1L on 11/19

## 2018-11-19 NOTE — CONSULTS
Palliative Care Acknowledgement of Consult - .date    Initial Consult received and completed by Palliative Care Provider:  Dr. SASHA Umana, CNS to follow starting 11/19    Thank you for allowing us to be a part of the care of this patient.          Prachi Jones, ALEKSANDR, ACHP-SW

## 2018-11-19 NOTE — CONSULTS
[facility]  Nephrology  Consult Note    Patient Name: Arjun Mcpherson Jr.  MRN: [id number]  Admission Date: 11/15/2018  Hospital Length of Stay: 4 days  Attending Provider: Brodie Rao MD   Primary Care Physician: Jason Santo MD  Principal Problem:Left facial swelling    Inpatient consult to Nephrology  Consult performed by: Terrence Connolly MD  Consult ordered by: Issac Crow MD  Reason for consult: PHOENIX        Subjective:     HPI: Mr Arjun Mcpherson Jr. is a 63 y.o. AA male with a complex PMHx relevant for peripheral T-cell lymphoma (last seen by Dr. Rao, patient of Dr. Franco, previously on EPOCH, currently receiving keytruda and Romidepsin treatment at [facility]), DMt2 on metformin admitted to BMTx service secondary to swelling and blistering of his face. Nephrology consulted for PHOENIX. He has a a sCr of 0.9 mg/dL. Today his sCr was 2.8 mg/dL, sCr has been rising 24 hrs post admission from 0.9 --> 1.2 --> 2.0--> 2.8 mg/dL. He also noted  UOP. He has been treated empirically with Bactrim for his cellulitis in his face. He has poor PO intake and really not eating since his BUN is 9 and he is 98 kgs. He was given IV fluid overnight NS 1 lt and repeat this morning. Bactrim was stooped at request of Nephrology. His UOP has started to improved. UA has 1+ prtein and 1+ occ Blood and UPCr was 0.5 g/g.            Past Medical History:   Diagnosis Date    Cancer     Hearing loss     Peripheral neuropathy 2018    Pre-diabetes        Past Surgical History:   Procedure Laterality Date    DISSECTION OF NECK Left 2018    Procedure: DISSECTION, NECK;  Surgeon: Regan King MD;  Location: [facility] OR [address] [place] [address];  Service: ENT;  Laterality: Left;    DISSECTION, NECK Left 2018    Performed by Regan King MD at [facility] OR [address] [place] [address]    EXCISION OF PAROTID GLAND Left 2018    Procedure: EXCISION, PAROTID GLAND;  Surgeon: Regan King MD;   Location: [facility] OR [address] [place] [address];  Service: ENT;  Laterality: Left;    EXCISION, PAROTID GLAND Left 5/30/2018    Performed by Regan King MD at [facility] OR [address] [place] [address]    KNEE SURGERY Right        Review of patient's allergies indicates:  No Known Allergies  Current Facility-Administered Medications   Medication Frequency    allopurinol tablet 300 mg Daily    dextrose 50% injection 12.5 g PRN    dextrose 50% injection 25 g PRN    enoxaparin injection 40 mg Daily    gabapentin capsule 100 mg TID    glucagon (human recombinant) injection 1 mg PRN    glucose chewable tablet 16 g PRN    glucose chewable tablet 24 g PRN    HYDROmorphone injection 1 mg Q4H PRN    influenza (FLUZONE QUADRIVALENT) vaccine 0.5 mL Prior to discharge    ondansetron injection 4 mg Q6H PRN    piperacillin-tazobactam 4.5 g in sodium chloride 0.9% 100 mL IVPB (ready to mix system) Q8H    sodium chloride 0.9% flush 5 mL PRN    white petrolatum 41 % ointment BID     Family History     None        Tobacco Use    Smoking status: Former Smoker     Types: Cigars    Smokeless tobacco: Never Used    Tobacco comment: 1 cigar a week   Substance and Sexual Activity    Alcohol use: Yes     Alcohol/week: 0.6 oz     Types: 1 Shots of liquor per week     Comment: 2 per month    Drug use: No    Sexual activity: Not on file     Review of Systems   Constitutional: Positive for activity change, appetite change and fatigue. Negative for fever and unexpected weight change.   HENT: Positive for drooling and facial swelling. Negative for hearing loss and tinnitus.    Eyes: Negative for visual disturbance.   Respiratory: Negative for chest tightness and shortness of breath.    Cardiovascular: Negative for chest pain and leg swelling.   Gastrointestinal: Negative for abdominal pain and nausea.   Genitourinary: Positive for decreased urine volume. Negative for difficulty urinating, dysuria and flank pain.   Musculoskeletal: Negative for arthralgias and  myalgias.   Skin: Negative for color change.   Neurological: Positive for weakness. Negative for dizziness, syncope, light-headedness and headaches.   Psychiatric/Behavioral: Negative for behavioral problems and confusion.     Objective:     Vital Signs (Most Recent):  Temp: 99.2 °F (37.3 °C) (11/19/18 1620)  Pulse: 102 (11/19/18 1620)  Resp: 18 (11/19/18 1620)  BP: (!) 162/88 (11/19/18 1620)  SpO2: 97 % (11/19/18 1620)  O2 Device (Oxygen Therapy): room air (11/19/18 1620) Vital Signs (24h Range):  Temp:  [97.8 °F (36.6 °C)-100.1 °F (37.8 °C)] 99.2 °F (37.3 °C)  Pulse:  [] 102  Resp:  [17-20] 18  SpO2:  [93 %-99 %] 97 %  BP: (125-162)/(73-88) 162/88     Weight: 97.9 kg (215 lb 13.3 oz) (11/15/18 2328)  Body mass index is 27.71 kg/m².  Body surface area is 2.26 meters squared.    I/O last 3 completed shifts:  In: 3031.7 [P.O.:450; I.V.:831.7; IV Piggyback:1750]  Out: 850 [Urine:850]    Physical Exam   Constitutional: He is oriented to person, place, and time. He appears well-developed. No distress.   HENT:   Head: Normocephalic and atraumatic.   Lips are swollen (L>>R) Left buccal soft tissue is swollen, firm. No masses palpated. Oral Tongue mobile, normal in size. Hard Palate WNL     Eyes: Conjunctivae are normal. Pupils are equal, round, and reactive to light.   Neck: Trachea normal and normal range of motion. Neck supple. No JVD present.   Neck: L swelling with crusting and bulla present.    Cardiovascular: Normal rate, regular rhythm, S1 normal, S2 normal and normal pulses. Exam reveals no gallop and no friction rub.   No murmur heard.  Pulmonary/Chest: Effort normal and breath sounds normal.   Abdominal: Soft. Bowel sounds are normal. He exhibits no distension. There is no tenderness.   Musculoskeletal: Normal range of motion. He exhibits no edema.   Neurological: He is alert and oriented to person, place, and time.   Skin: Skin is warm and dry. Capillary refill takes less than 2 seconds.   Psychiatric: He  has a normal mood and affect. His behavior is normal.   Vitals reviewed.      Significant Labs:  BMP:   Recent Labs   Lab 11/19/18  0400         CO2 23   BUN 9   CREATININE 2.8*   CALCIUM 8.7   MG 2.0     Cardiac Markers: No results for input(s): CKMB, TROPONINT, MYOGLOBIN in the last 168 hours.  CBC:   Recent Labs   Lab 11/19/18 0400   WBC 4.02   RBC 3.58*   HGB 10.8*   HCT 33.9*   *   MCV 95   MCH 30.2   MCHC 31.9*     CMP:   Recent Labs   Lab 11/19/18 0400      CALCIUM 8.7   ALBUMIN 2.8*   PROT 5.6*      K 3.5   CO2 23      BUN 9   CREATININE 2.8*   ALKPHOS 136*   ALT 33   AST 71*   BILITOT 0.6     All labs within the past 24 hours have been reviewed.    Significant Imaging:  Labs: Reviewed  X-Ray: Reviewed  US: Reviewed  CT: Reviewed    Assessment/Plan:     PHOENIX (acute kidney injury)    PHOENIX suspect a component of pre-renal from poor po intake in combination Yesenia from Bactrim.     · Leandro 55 and UK 19 Expecting K to be lower in seeting of Bactrim use  · UPCr 0.5 g/g   · Renal/retroperitoneal USG: no evidence of Hydronephrosis good size Kidneys 13.0 Left and 12.9 right there is evidence of hyperechogenicity suggesting some kidney disease but his renal function baseline is very good  · Evaluate Urine sediment once sample is collected  · UOP is improving with fluid challenge. Will trend Electrolyte and UOP as well as sCr   · Maintain MAP > 65  · Hg >7 if Hg < 7 please transfuse.   · continue to monitor strict I/O's, daily weights, renally dose medications, and avoid nephrotoxic agents  · Peripheral smear to r/o Schistocytes in setting of LDH  · Cont allopurinol to control possible TLS                 Thank you for your consult. I will follow-up with patient. Please contact us if you have any additional questions.    Terrence Connolly MD  Nephrology  [facility]

## 2018-11-19 NOTE — PROGRESS NOTES
Palliative care follow-up from Dr. Artis on 11-16:     Pt off floor having procedure.  Pt's wife at bedside.   Per wife, she is interested in having Palliative care services involved in conjunction with pt's medical management.     Spoke to pt's wife about possibility  of out-pt based pal care programs and following up with Palliative care MD in out pt clinic at [facility].     Will follow-up with pt when he returns from procedure.   Wife given pal care number.     Harini Umana MN, APRN, AGCNS

## 2018-11-19 NOTE — HPI
Mr Arjun Mcpherson Jr. is a 63 y.o. AA male with a complex PMHx relevant for peripheral T-cell lymphoma (last seen by Dr. Rao, patient of Dr. Reids, previously on EPOCH, currently receiving keytruda and Romidepsin treatment at [facility]), DMt2 on metformin admitted to BMTx service secondary to swelling and blistering of his face. Nephrology consulted for PHOENIX. He has a a sCr of 0.9 mg/dL. Today his sCr was 2.8 mg/dL, sCr has been rising 24 hrs post admission from 0.9 --> 1.2 --> 2.0--> 2.8 mg/dL. He also noted  UOP. He has been treated empirically with Bactrim for his cellulitis in his face. He has poor PO intake and really not eating since his BUN is 9 and he is 98 kgs. He was given IV fluid overnight NS 1 lt and repeat this morning. Bactrim was stooped at request of Nephrology. His UOP has started to improved. UA has 1+ prtein and 1+ occ Blood and UPCr was 0.5 g/g.

## 2018-11-19 NOTE — PT/OT/SLP PROGRESS
"Speech Language Pathology Treatment    Patient Name:  Arjun Mcpherson Jr.   MRN:  [id number]  Admitting Diagnosis: Left facial swelling    Recommendations:                 General Recommendations:  Dysphagia therapy  Diet recommendations:  Mechanical soft, Liquid Diet Level: Thin   Aspiration Precautions: Alternating bites/sips, Assistance with meals, Feed only when awake/alert, HOB to 90 degrees, Meds whole buried in puree, Monitor for s/s of aspiration, Remain upright 30 minutes post meal and Strict aspiration precautions   General Precautions: Standard, aspiration, fall(mechanical soft solids. )  Communication strategies:  go to room if call light pushed    Subjective     "I want to try some thin regular cold water."  Patient goals: to go home    Pain/Comfort:  · Pain Rating 1: 0/10  · Pain Rating Post-Intervention 1: 0/10    Objective:     Has the patient been evaluated by SLP for swallowing?   Yes  Keep patient NPO? No   Current Respiratory Status: room air      Pt nauseous and not willing to try po on first attempt. On second attempt, pt more willing to accept po trials of thin liquids only. Pt with appropriate positioning for po intake upon SLP entering room. Pt offered and accepted po trials of thin liquids via straw X4 cyclical sips. Pt demonstrated no overt signs of airway compromise. Pt continues to demonstrate significant edema of L cheek and bottom lip, however, does not appear to impact swallow function. Of note, pt with coughing post po trials with expectoration of mucous resulting in pt utilizing suction to remove mucous expectorated. Pt stated he had a cold and had lots of mucous. Pt requested that he wished to be on thin liquids at this time, however did not mind the nectar thick liquids. Since pt with no signs of aspiration and pt tolerated po trials without difficulty, ST recommending advancement to thin liquids at this time with STRICT aspiration precautions.   Extensive education provided re: " role of SLP, strict aspiration precautions, s/s of aspiration, advancement to thin liquids, and POC. Pt verbalized understanding and demonstrated learning of information.   SLP communicated findings with nurse and team.  Whiteboard updated.     Assessment:     Arjnu Mcpherson Jr. is a 63 y.o. male with an SLP diagnosis of Dysphagia.     Goals:   Multidisciplinary Problems     SLP Goals        Problem: SLP Goal    Goal Priority Disciplines Outcome   SLP Goal     SLP Ongoing (interventions implemented as appropriate)   Description:  Speech Language Pathology Goals  Goals expected to be met by 11/30    1. Pt will tolerate diet of nectar thickened liquids and purees solids   2. Pt will participate in additional PO trials during future speech therapy sessions to help determine least restrictive diet                       Plan:     · Patient to be seen:  4 x/week   · Plan of Care expires:  12/15/18  · Plan of Care reviewed with:  patient, spouse   · SLP Follow-Up:  Yes       Discharge recommendations:  outpatient speech therapy       Time Tracking:     SLP Treatment Date:   11/19/18  Speech Start Time:  1250  Speech Stop Time:  1306     Speech Total Time (min):  16 min    Billable Minutes: Treatment Swallowing Dysfunction 8 and Seld Care/Home Management Training 8     MELITON DensonS., CCC-SLP  Speech Language Pathologist  [phone] - pager   11/19/2018      Alisha Brown, MS CCC-SLP  11/19/2018

## 2018-11-19 NOTE — ASSESSMENT & PLAN NOTE
PHOENIX suspect a component of pre-renal from poor po intake in combination Yesenia from Bactrim.     · Leandro 55 and UK 19 Expecting K to be lower in seeting of Bactrim use  · UPCr 0.5 g/g   · Renal/retroperitoneal USG: no evidence of Hydronephrosis good size Kidneys 13.0 Left and 12.9 right there is evidence of hyperechogenicity suggesting some kidney disease but his renal function baseline is very good  · Evaluate Urine sediment once sample is collected  · UOP is improving with fluid challenge. Will trend Electrolyte and UOP as well as sCr   · Maintain MAP > 65  · Hg >7 if Hg < 7 please transfuse.   · continue to monitor strict I/O's, daily weights, renally dose medications, and avoid nephrotoxic agents

## 2018-11-20 VITALS
RESPIRATION RATE: 18 BRPM | SYSTOLIC BLOOD PRESSURE: 151 MMHG | WEIGHT: 215.81 LBS | TEMPERATURE: 99 F | OXYGEN SATURATION: 93 % | HEIGHT: 74 IN | DIASTOLIC BLOOD PRESSURE: 84 MMHG | BODY MASS INDEX: 27.7 KG/M2 | HEART RATE: 104 BPM

## 2018-11-20 PROBLEM — R74.01 TRANSAMINITIS: Status: ACTIVE | Noted: 2018-11-20

## 2018-11-20 LAB
ALBUMIN SERPL BCP-MCNC: 2.7 G/DL
ALP SERPL-CCNC: 206 U/L
ALT SERPL W/O P-5'-P-CCNC: 57 U/L
ANION GAP SERPL CALC-SCNC: 11 MMOL/L
ANISOCYTOSIS BLD QL SMEAR: SLIGHT
AST SERPL-CCNC: 111 U/L
BACTERIA BLD CULT: NORMAL
BACTERIA BLD CULT: NORMAL
BACTERIA SPEC ANAEROBE CULT: NORMAL
BASOPHILS # BLD AUTO: 0.03 K/UL
BASOPHILS NFR BLD: 0.7 %
BILIRUB SERPL-MCNC: 0.9 MG/DL
BUN SERPL-MCNC: 13 MG/DL
BURR CELLS BLD QL SMEAR: ABNORMAL
CALCIUM SERPL-MCNC: 8.7 MG/DL
CHLORIDE SERPL-SCNC: 103 MMOL/L
CK SERPL-CCNC: 104 U/L
CO2 SERPL-SCNC: 24 MMOL/L
CREAT SERPL-MCNC: 3.1 MG/DL
DIFFERENTIAL METHOD: ABNORMAL
EOSINOPHIL # BLD AUTO: 0.1 K/UL
EOSINOPHIL NFR BLD: 1.6 %
ERYTHROCYTE [DISTWIDTH] IN BLOOD BY AUTOMATED COUNT: 14 %
EST. GFR  (AFRICAN AMERICAN): 23.8 ML/MIN/1.73 M^2
EST. GFR  (NON AFRICAN AMERICAN): 20.6 ML/MIN/1.73 M^2
FIBRINOGEN PPP-MCNC: 337 MG/DL
GLUCOSE SERPL-MCNC: 75 MG/DL
HCT VFR BLD AUTO: 32.4 %
HGB BLD-MCNC: 10.2 G/DL
HYPOCHROMIA BLD QL SMEAR: ABNORMAL
IMM GRANULOCYTES # BLD AUTO: 0.03 K/UL
IMM GRANULOCYTES NFR BLD AUTO: 0.7 %
INR PPP: 1.1
LDH SERPL L TO P-CCNC: 1539 U/L
LYMPHOCYTES # BLD AUTO: 0.6 K/UL
LYMPHOCYTES NFR BLD: 14.3 %
MAGNESIUM SERPL-MCNC: 1.9 MG/DL
MCH RBC QN AUTO: 29.5 PG
MCHC RBC AUTO-ENTMCNC: 31.5 G/DL
MCV RBC AUTO: 94 FL
MONOCYTES # BLD AUTO: 1 K/UL
MONOCYTES NFR BLD: 22 %
NEUTROPHILS # BLD AUTO: 2.7 K/UL
NEUTROPHILS NFR BLD: 60.7 %
NRBC BLD-RTO: 0 /100 WBC
OVALOCYTES BLD QL SMEAR: ABNORMAL
PATH REV BLD -IMP: NORMAL
PHOSPHATE SERPL-MCNC: 3.3 MG/DL
PLATELET # BLD AUTO: 144 K/UL
PMV BLD AUTO: 9.9 FL
POIKILOCYTOSIS BLD QL SMEAR: SLIGHT
POLYCHROMASIA BLD QL SMEAR: ABNORMAL
POTASSIUM SERPL-SCNC: 3.4 MMOL/L
PROT SERPL-MCNC: 5.5 G/DL
PROTHROMBIN TIME: 11.4 SEC
RBC # BLD AUTO: 3.46 M/UL
SODIUM SERPL-SCNC: 138 MMOL/L
URATE SERPL-MCNC: 3.1 MG/DL
VANCOMYCIN SERPL-MCNC: 17.7 UG/ML
WBC # BLD AUTO: 4.46 K/UL

## 2018-11-20 PROCEDURE — 99232 SBSQ HOSP IP/OBS MODERATE 35: CPT | Mod: ,,, | Performed by: INTERNAL MEDICINE

## 2018-11-20 PROCEDURE — 25000003 PHARM REV CODE 250: Performed by: STUDENT IN AN ORGANIZED HEALTH CARE EDUCATION/TRAINING PROGRAM

## 2018-11-20 PROCEDURE — 99233 SBSQ HOSP IP/OBS HIGH 50: CPT | Mod: ,,, | Performed by: INTERNAL MEDICINE

## 2018-11-20 PROCEDURE — 85025 COMPLETE CBC W/AUTO DIFF WBC: CPT

## 2018-11-20 PROCEDURE — 63600175 PHARM REV CODE 636 W HCPCS: Performed by: STUDENT IN AN ORGANIZED HEALTH CARE EDUCATION/TRAINING PROGRAM

## 2018-11-20 PROCEDURE — 83735 ASSAY OF MAGNESIUM: CPT

## 2018-11-20 PROCEDURE — 84100 ASSAY OF PHOSPHORUS: CPT

## 2018-11-20 PROCEDURE — 84550 ASSAY OF BLOOD/URIC ACID: CPT

## 2018-11-20 PROCEDURE — 82550 ASSAY OF CK (CPK): CPT

## 2018-11-20 PROCEDURE — 85384 FIBRINOGEN ACTIVITY: CPT

## 2018-11-20 PROCEDURE — 80202 ASSAY OF VANCOMYCIN: CPT

## 2018-11-20 PROCEDURE — 80053 COMPREHEN METABOLIC PANEL: CPT

## 2018-11-20 PROCEDURE — 25000003 PHARM REV CODE 250: Performed by: NURSE PRACTITIONER

## 2018-11-20 PROCEDURE — 99232 SBSQ HOSP IP/OBS MODERATE 35: CPT | Mod: ,,, | Performed by: CLINICAL NURSE SPECIALIST

## 2018-11-20 PROCEDURE — 85610 PROTHROMBIN TIME: CPT

## 2018-11-20 PROCEDURE — 83615 LACTATE (LD) (LDH) ENZYME: CPT

## 2018-11-20 RX ORDER — GABAPENTIN 100 MG/1
100 CAPSULE ORAL 2 TIMES DAILY
Status: DISCONTINUED | OUTPATIENT
Start: 2018-11-20 | End: 2018-11-20 | Stop reason: HOSPADM

## 2018-11-20 RX ORDER — POTASSIUM CHLORIDE 750 MG/1
30 CAPSULE, EXTENDED RELEASE ORAL ONCE
Status: COMPLETED | OUTPATIENT
Start: 2018-11-20 | End: 2018-11-20

## 2018-11-20 RX ORDER — AMOXICILLIN AND CLAVULANATE POTASSIUM 500; 125 MG/1; MG/1
1 TABLET, FILM COATED ORAL 2 TIMES DAILY
Qty: 10 TABLET | Refills: 0 | Status: SHIPPED | OUTPATIENT
Start: 2018-11-20 | End: 2018-11-25

## 2018-11-20 RX ADMIN — ALLOPURINOL 300 MG: 300 TABLET ORAL at 08:11

## 2018-11-20 RX ADMIN — WHITE PETROLATUM: 1.75 OINTMENT TOPICAL at 08:11

## 2018-11-20 RX ADMIN — ONDANSETRON 4 MG: 2 INJECTION INTRAMUSCULAR; INTRAVENOUS at 01:11

## 2018-11-20 RX ADMIN — Medication 1 MG: at 03:11

## 2018-11-20 RX ADMIN — ONDANSETRON 4 MG: 2 INJECTION INTRAMUSCULAR; INTRAVENOUS at 03:11

## 2018-11-20 RX ADMIN — ONDANSETRON 4 MG: 2 INJECTION INTRAMUSCULAR; INTRAVENOUS at 09:11

## 2018-11-20 RX ADMIN — PIPERACILLIN AND TAZOBACTAM 4.5 G: 4; .5 INJECTION, POWDER, LYOPHILIZED, FOR SOLUTION INTRAVENOUS; PARENTERAL at 06:11

## 2018-11-20 RX ADMIN — Medication 1 MG: at 06:11

## 2018-11-20 RX ADMIN — GABAPENTIN 100 MG: 100 CAPSULE ORAL at 08:11

## 2018-11-20 RX ADMIN — PIPERACILLIN AND TAZOBACTAM 4.5 G: 4; .5 INJECTION, POWDER, LYOPHILIZED, FOR SOLUTION INTRAVENOUS; PARENTERAL at 02:11

## 2018-11-20 RX ADMIN — POTASSIUM CHLORIDE 30 MEQ: 750 CAPSULE, EXTENDED RELEASE ORAL at 12:11

## 2018-11-20 NOTE — PROGRESS NOTES
Discharge instructions and prescriptions given and explained to pt.  Pt. verbalized understanding with no further questions.  Left upper arm PICC line flushed and left in place. VS WDL.  Patient is awaiting transport and leaving with his wife.      ROAD TEST  O=SpO2 97% on RA  A=Ambulating around room and hallway  D=left upper arm PICC left in place  T=Tolerating mechanical soft diet  E=Voids  S=Performs self care independently  T=Teaching on wounds care complete

## 2018-11-20 NOTE — ASSESSMENT & PLAN NOTE
- Cr increased from 1.2 to 2.0 on 11/18 and worsening to 2.8 on 2/19, continues with worsen with creat 3.1 on 11/20  - PHOENIX continues to worsen, nephrology consulted. FeNA indicating pre-renal PHOENIX, 1L NS bolus given again. US retroperitoneal showing cortical thinning and decreased perfusion bilaterally, consistent with medical renal disease  - unlikely ischemic ATN as no episodes of hypotension; unlikely AIN as no eosinophils seen on Pink's stain however will stop bactrim today as this could be contributing to PHOENIX. Vancomycin level supra-therapeutic at 32.9 yesterday as well, holding additional  Vancomycin for now and obtaining daily levels.   - repeat U/A unremarkable for infection, FeNA unchanged (0.5%, pre-renal)  - will follow nephrology recs  - received 1L IVF on 11/18 and additional 1L on 11/19

## 2018-11-20 NOTE — SUBJECTIVE & OBJECTIVE
Past Medical History:   Diagnosis Date    Cancer     Hearing loss     Peripheral neuropathy 6/19/2018    Pre-diabetes        Past Surgical History:   Procedure Laterality Date    DISSECTION OF NECK Left 5/30/2018    Procedure: DISSECTION, NECK;  Surgeon: Regan King MD;  Location: [facility] OR [address] [place] [address];  Service: ENT;  Laterality: Left;    DISSECTION, NECK Left 5/30/2018    Performed by Regan King MD at [facility] [address] [place] [address]    EXCISION OF PAROTID GLAND Left 5/30/2018    Procedure: EXCISION, PAROTID GLAND;  Surgeon: Regan King MD;  Location: [facility] [address] [place] [address];  Service: ENT;  Laterality: Left;    EXCISION, PAROTID GLAND Left 5/30/2018    Performed by Regan King MD at [facility] OR [address] [place] [address]    KNEE SURGERY Right        Review of patient's allergies indicates:  No Known Allergies    Medications:  Continuous Infusions:  Scheduled Meds:   allopurinol  300 mg Oral Daily    enoxaparin  40 mg Subcutaneous Daily    gabapentin  100 mg Oral BID    piperacillin-tazobactam (ZOSYN) IVPB  4.5 g Intravenous Q8H    white petrolatum   Topical (Top) BID     PRN Meds:dextrose 50%, dextrose 50%, glucagon (human recombinant), glucose, glucose, HYDROmorphone, influenza, ondansetron, sodium chloride 0.9%    Family History     None        Tobacco Use    Smoking status: Former Smoker     Types: Cigars    Smokeless tobacco: Never Used    Tobacco comment: 1 cigar a week   Substance and Sexual Activity    Alcohol use: Yes     Alcohol/week: 0.6 oz     Types: 1 Shots of liquor per week     Comment: 2 per month    Drug use: No    Sexual activity: Not on file       Review of Systems   Constitutional: Positive for fatigue. Negative for chills and fever.   HENT: Positive for facial swelling.    Respiratory: Positive for choking. Negative for shortness of breath.    Cardiovascular: Negative for chest pain.   Gastrointestinal: Negative for constipation and nausea.   Psychiatric/Behavioral: Positive for dysphoric  mood. The patient is nervous/anxious.    All other systems reviewed and are negative.    Objective:     Vital Signs (Most Recent):  Temp: 98.5 °F (36.9 °C) (11/20/18 0707)  Pulse: (!) 127 (11/20/18 0744)  Resp: 17 (11/20/18 0707)  BP: 127/71 (11/20/18 0707)  SpO2: 97 % (11/20/18 1012) Vital Signs (24h Range):  Temp:  [97.8 °F (36.6 °C)-99.2 °F (37.3 °C)] 98.5 °F (36.9 °C)  Pulse:  [] 127  Resp:  [17-20] 17  SpO2:  [93 %-99 %] 97 %  BP: (127-162)/(71-88) 127/71     Weight: 97.9 kg (215 lb 13.3 oz)  Body mass index is 27.71 kg/m².    Review of Symptoms  Bowel Management Plan (BMP): No    ECOG Performance Status Grade: 2 - Ambulates, capable of self care only    Physical Exam   Constitutional: He is oriented to person, place, and time. Vital signs are normal. He is cooperative.   HENT:   Large necrotic mass to left neck and face    No stridor/drooling on my assessment   Pulmonary/Chest: No respiratory distress.   Neurological: He is alert and oriented to person, place, and time.   Nursing note and vitals reviewed.      CBC:   Recent Labs   Lab 11/20/18  0400   WBC 4.46   HGB 10.2*   HCT 32.4*   MCV 94   *     BMP:  Recent Labs   Lab 11/20/18  0400   GLU 75      K 3.4*      CO2 24   BUN 13   CREATININE 3.1*   CALCIUM 8.7   MG 1.9     LFT:  Lab Results   Component Value Date     (H) 11/20/2018    ALKPHOS 206 (H) 11/20/2018    BILITOT 0.9 11/20/2018     Albumin:   Albumin   Date Value Ref Range Status   11/20/2018 2.7 (L) 3.5 - 5.2 g/dL Final     Protein:   Total Protein   Date Value Ref Range Status   11/20/2018 5.5 (L) 6.0 - 8.4 g/dL Final     Lactic acid:   Lab Results   Component Value Date    LACTATE 2.3 (H) 11/15/2018    LACTATE 3.7 (HH) 11/15/2018       Significant Imaging: CT: I have reviewed all pertinent results/findings within the past 24 hours:  neck mass    Advanced Directives::  Living Will: Yes. Copy on chart: Yes - patient wishes that all LST be withheld if he has an  incurable/irreversible condition    LaPOST: No    Do Not Resuscitate Status: No  Medical Power of : Yes. Agent's Name: Mylene Mcpherson    Decision-Making Capacity: Patient/wife answered questions.    Living Arrangements: Lives with spouse

## 2018-11-20 NOTE — PLAN OF CARE
Problem: Patient Care Overview  Goal: Plan of Care Review  Outcome: Ongoing (interventions implemented as appropriate)  Pt is resting in bed and is up to the bathroom. VS stable, pt is afebrile. Left upper arm PICC remains patent to a flush bag for IV Zosyn. Pt is on telemetry with continuous pulse ox monitoring. Pt states he feels the swelling on the left side of his face is getting worse. Pt exhibits no signs or symptoms of occluding airway/resp distress. Pt complained of pain to his face & was medicated as per order. Pt's wife visited at the bedside earlier in the shift. Pt's personal items and call bell placed within easy reach. Bed is locked, in lowest position, with side rails up x 2. Non-skid socks in place. Pt encouraged to call for assistance as needed. Will continue to monitor.

## 2018-11-20 NOTE — ASSESSMENT & PLAN NOTE
PHOENIX suspect a component of pre-renal from poor po intake in combination Yesenia from Bactrim.     · Increase in UOP may suggest renal rceovery but clearance is lagging behind.   · Agree with IV fluid supplementation since Insensible losses increase in him and poor po intake makes him volume depleted.   · UPCr 0.5 g/g   · Renal/retroperitoneal USG: no evidence of Hydronephrosis good size Kidneys 13.0 Left and 12.9 right there is evidence of hyperechogenicity suggesting some kidney disease but his renal function baseline is very good  · Urine sediment: shows bland urine no cast seen few RBC but this is a kaminski sample.   · Peripheral smear with no scistocytes but LDH is > 1500 Haptoglobin pending  · Please get RFP daily and encourage PO intake  · Maintain MAP > 65, Hemodynamically stable  · Hg >7 if Hg < 7 please transfuse.   · continue to monitor strict I/O's, daily weights, renally dose medications, and avoid nephrotoxic agents

## 2018-11-20 NOTE — DISCHARGE SUMMARY
[facility]  Hematology  Bone Marrow Transplant  Discharge Summary      Patient Name: Arjun Mcpherson Jr.  MRN: [id number]  Admission Date: 11/15/2018  Hospital Length of Stay: 5 days  Discharge Date and Time:  11/20/2018 3:55 PM  Attending Physician: Brodie Rao MD   Discharging Provider: Love Leslie NP  Primary Care Provider: Jason Santo MD    HPI: Mr. Mcpherson is a 63M with peripheral T-cell lymphoma (last seen by Dr. Rao, patient of Dr. Mcgill's, previously on EPOCH, currently receiving keytruda and Romidepsin treatment at [facility]), DM2 on metformin here for swelling and blistering of his face.      These symptoms started worsening 2.5 weeks ago. Patient was admitted from 10/30-11/1 for progressively worsening L-sided neck and facial plethora. CT head revealed no acute intracranial abnormality. CTA neck demonstrated a large infiltrating soft tissue mass involving the left face and neck as above, previously hypermetabolic on PET/CT and compatible with malignancy. Multiple prominent right submental and right internal jugular chain lymph nodes. He was started on oral steroids and patient stated he noted mild improvement of the swelling. Patient counseled on importance of receiving chemotherapy to treat the malignancy and swelling. He made the decision to f/u and receive chemotherapy at [facility]. He was discharged on oral prednisone and an outpatient appointment was made to f/u w/ Dr. Rao in clinic. He was advised on signs and symptoms to monitor which if present should prompt a visit to the ED including worsening facial swelling or airway compromise     Patient noted that his facial swelling got worse and developed blisters after receiving his first chemo cycle of pembrolizumab and romidepsin on Monday at [facility] - he is currently involved in a trial there. He has had L facial swelling prior to starting the trial, but there has been progressive inward swelling into his  oral cavity, resulting in difficulty swallowing and sometimes difficulty breathing as well. There is also skin changes involving blistering and oozing. He denies any pain, fever, chills. There is also swelling on his L chest with accompanying erythema, but with no pain.      CT scan in the ED shows mild narrowing of his airway and small retro-pharyndeal effusion. ENT was consulted in the ED, no not feel that there is airway compromise.  They did find, however, that L vocal cord shows signs of paralysis. Blood and wound cultures placed. Lactic acid at 3.7, patient received 1L NS.      Patient to be admitted to BMT service for further evaluation and management.        * No surgery found *     Hospital Course: 11/17/2018 Remains on broad spectrum vanc/Zosyn/ Bactrim today, had nausea with vomiting requiring parenteral antiemetics. Patient and wife seen and counseled by Palliative yesterday, remains full code. Airway patent and stable.   11/18/2018 Patient with nausea vomiting controlled with PRNs, tumor in L cheek is now necrotic and weeping/ crusting. Continuing on reduced dose frequency vanc today for likely prerenal PHOENIX with poor PO intake. Checking urine lytes, Pink's stain, giving IVF. Discussing care coordination between [facility] and OM.   11/19/2018 PHOENIX continues to worsen, nephrology consulted. FeNA indicating pre-renal PHOENIX, 1L NS bolus given again. US retroperitoneal showing cortical thinning and decreased perfusion bilaterally, consistent with medical renal disease  11/20/2018 Worsening PHOENIX and transaminitis, creat 3.1, ALT 57,  today. Peripheral blood smear ordered for LDH 1539 per neph recs. PT consulted for limb massage d/t pronounced lymph edema, compression stocking also ordered for arm. Continues on zosyn for tumor wound to face, for discharge zosyn changed to augmentin to continue for 5 day course. Dr. Rao in contact with physician at [facility] for chemo dosing for patient, currently  scheduled for tomorrow 11/21, transportation concern per family. Plan to discharge and wife to drive patient to [facility] tomorrow for appointment.    Consults (From admission, onward)        Status Ordering Provider     Inpatient consult to ENT  Once     Provider:  (Not yet assigned)    Completed AZAEL MENDEZ [facility]     Inpatient consult to Nephrology  Once     Provider:  (Not yet assigned)    Completed BRETT STONE     Inpatient consult to Palliative Care  Once     Provider:  (Not yet assigned)    Completed MELVINA GLOVER Diagnostic Studies: Labs:   CMP   Recent Labs   Lab 11/19/18  0400 11/20/18  0400    138   K 3.5 3.4*    103   CO2 23 24    75   BUN 9 13   CREATININE 2.8* 3.1*   CALCIUM 8.7 8.7   PROT 5.6* 5.5*   ALBUMIN 2.8* 2.7*   BILITOT 0.6 0.9   ALKPHOS 136* 206*   AST 71* 111*   ALT 33 57*   ANIONGAP 12 11   ESTGFRAFRICA 26.9* 23.8*   EGFRNONAA 23.3* 20.6*   , CBC   Recent Labs   Lab 11/19/18  0400 11/20/18  0400   WBC 4.02 4.46   HGB 10.8* 10.2*   HCT 33.9* 32.4*   * 144*    and Lipid Panel No results found for: CHOL, HDL, LDLCALC, TRIG, CHOLHDL    Pending Diagnostic Studies:     Procedure Component Value Units Date/Time    Fl Modified Barium Swallow Speech [[id number]]     Order Status:  Sent Lab Status:  No result         Final Active Diagnoses:    Diagnosis Date Noted POA    PRINCIPAL PROBLEM:  Left facial swelling [R22.0] 10/31/2018 Yes    Transaminitis [R74.0] 11/20/2018 No    PHOENIX (acute kidney injury) [N17.9] 11/19/2018 No    Vocal cord paralysis, unilateral partial [J38.01] 11/16/2018 Yes    Palliative care encounter [Z51.5] 11/16/2018 Not Applicable    Acute bullous dermatitis [L13.9] 11/15/2018 Yes    Dysphagia [R13.10] 11/15/2018 Yes    Type 2 diabetes mellitus without complication, with long-term current use of insulin [E11.9, Z79.4] 10/31/2018 Not Applicable    T-cell lymphoma [C85.90] 06/11/2018 Yes      Problems Resolved  During this Admission:      Discharged Condition: stable    Disposition: Home or Self Care    Follow Up: Per MD Brock    Patient Instructions:      Diet diabetic   Order Comments: Mechanical soft     Notify your health care provider if you experience any of the following:  increased confusion or weakness     Notify your health care provider if you experience any of the following:  persistent dizziness, light-headedness, or visual disturbances     Notify your health care provider if you experience any of the following:  worsening rash     Notify your health care provider if you experience any of the following:  severe persistent headache     Notify your health care provider if you experience any of the following:  difficulty breathing or increased cough     Notify your health care provider if you experience any of the following:  redness, tenderness, or signs of infection (pain, swelling, redness, odor or green/yellow discharge around incision site)     Notify your health care provider if you experience any of the following:  severe uncontrolled pain     Notify your health care provider if you experience any of the following:  persistent nausea and vomiting or diarrhea     Notify your health care provider if you experience any of the following:  temperature >100.4     No dressing needed   Order Comments: Continue applying Aquaphor twice daily     Activity as tolerated     Medications:  Reconciled Home Medications:      Medication List      START taking these medications    amoxicillin-clavulanate 500-125mg 500-125 mg Tab  Commonly known as:  AUGMENTIN  Take 1 tablet (500 mg total) by mouth 2 (two) times daily. for 5 days        CONTINUE taking these medications    allopurinol 300 MG tablet  Commonly known as:  ZYLOPRIM  Take 300 mg by mouth once daily.     blood sugar diagnostic Strp  To check BG 4 times daily, to use with insurance preferred meter     blood-glucose meter kit  To check BG 4 times daily, to use  "with insurance preferred meter     gabapentin 100 MG capsule  Commonly known as:  NEURONTIN  Take 100 mg by mouth 3 (three) times daily.     lancets Misc  To check BG 4 times daily, to use with insurance preferred meter     metFORMIN 500 MG tablet  Commonly known as:  GLUCOPHAGE  Take 2 tablets (1,000 mg total) by mouth 2 (two) times daily with meals.     NUCYNTA 50 mg Tab  Generic drug:  tapentadol  Take by mouth.     ondansetron 8 MG tablet  Commonly known as:  ZOFRAN  Take 1 tablet (8 mg total) by mouth every 8 (eight) hours as needed for Nausea.     oxyCODONE-acetaminophen 5-325 mg per tablet  Commonly known as:  PERCOCET  1 or 2 tablets every 6 hours as needed for pain     pen needle, diabetic 32 gauge x 5/32" Ndle  Commonly known as:  BD ULTRA-FINE CORI PEN NEEDLE  To use 3 time daily with insulin     senna 8.6 mg tablet  Commonly known as:  SENOKOT  Take 1 tablet by mouth once daily.            Love Leslie NP  Bone Marrow Transplant  [facility]  "

## 2018-11-20 NOTE — SUBJECTIVE & OBJECTIVE
Subjective:     Interval History: Worsening PHOENIX and transaminitis, creat 3.1, ALT 57,  today. Peripheral blood smear ordered for LDH 1539 per neph recs. PT consulted for limb massage d/t pronounced lymph edema, compression stocking also ordered for arm. Continues on zosyn for tumor wound to face. Dr. Rao in contact with physician at [facility] for chemo dosing for patient, currently scheduled for tomorrow 11/21, transportation concern per family. May possibly do hospital to hospital transfer.    Objective:     Vital Signs (Most Recent):  Temp: 98.5 °F (36.9 °C) (11/20/18 0707)  Pulse: 91 (11/20/18 1209)  Resp: 17 (11/20/18 0707)  BP: 127/71 (11/20/18 0707)  SpO2: 97 % (11/20/18 1012) Vital Signs (24h Range):  Temp:  [98.5 °F (36.9 °C)-99.2 °F (37.3 °C)] 98.5 °F (36.9 °C)  Pulse:  [] 91  Resp:  [17-20] 17  SpO2:  [93 %-99 %] 97 %  BP: (127-162)/(71-88) 127/71     Weight: 97.9 kg (215 lb 13.3 oz)  Body mass index is 27.71 kg/m².  Body surface area is 2.26 meters squared.      Intake/Output - Last 3 Shifts       11/18 0700 - 11/19 0659 11/19 0700 - 11/20 0659 11/20 0700 - 11/21 0659    P.O. 450 720 470    I.V. (mL/kg) 831.7 (8.5)      IV Piggyback 300 1100     Total Intake(mL/kg) 1581.7 (16.2) 1820 (18.6) 470 (4.8)    Urine (mL/kg/hr) 400 (0.2) 1100 (0.5) 1000 (1.9)    Emesis/NG output       Stool  0     Total Output 400 1100 1000    Net +1181.7 +720 -530           Urine Occurrence  4 x     Stool Occurrence  1 x           Physical Exam   Constitutional: He is oriented to person, place, and time. Vital signs are normal. He is cooperative. No distress.   HENT:   Right Ear: External ear normal.   Left Ear: External ear normal.   Large necrotic mass to left neck and face  Yellow crusting to facial skin breakdown   Eyes: Conjunctivae and EOM are normal. Right eye exhibits no discharge. Left eye exhibits no discharge. No scleral icterus.   Neck: Edema and decreased range of motion present.        Cardiovascular: Regular rhythm, normal heart sounds and intact distal pulses. Exam reveals no gallop and no friction rub.   No murmur heard.  Pulmonary/Chest: Effort normal and breath sounds normal. No respiratory distress. He has no wheezes. He has no rales. He exhibits no tenderness.   Abdominal: Soft. Bowel sounds are normal. He exhibits no distension and no mass. There is no guarding.   Musculoskeletal: He exhibits edema. He exhibits no tenderness.   Left arm edema   Neurological: He is alert and oriented to person, place, and time. No cranial nerve deficit. Coordination normal.   Skin: Skin is warm. Rash noted. He is not diaphoretic. No erythema. No pallor.   Psychiatric: He has a normal mood and affect. Judgment and thought content normal.   Nursing note and vitals reviewed.      Significant Labs:   CBC:   Recent Labs   Lab 11/19/18  0400 11/20/18  0400   WBC 4.02 4.46   HGB 10.8* 10.2*   HCT 33.9* 32.4*   * 144*   , CMP:   Recent Labs   Lab 11/19/18  0400 11/20/18  0400    138   K 3.5 3.4*    103   CO2 23 24    75   BUN 9 13   CREATININE 2.8* 3.1*   CALCIUM 8.7 8.7   PROT 5.6* 5.5*   ALBUMIN 2.8* 2.7*   BILITOT 0.6 0.9   ALKPHOS 136* 206*   AST 71* 111*   ALT 33 57*   ANIONGAP 12 11   EGFRNONAA 23.3* 20.6*   , Coagulation:   Recent Labs   Lab 11/18/18  1507 11/19/18  0400 11/20/18  0400   INR 1.1 1.1 1.1    Uric Acid   Recent Labs   Lab 11/18/18  1507 11/19/18  0400 11/20/18  0400   URICACID 2.9* 3.3* 3.1*   , Urine Studies:   Recent Labs   Lab 11/19/18  0934   COLORU Yellow   APPEARANCEUA Clear   PHUR 5.0   SPECGRAV 1.010   PROTEINUA 1+*   GLUCUA Negative   KETONESU Negative   BILIRUBINUA Negative   OCCULTUA 1+*   NITRITE Negative   LEUKOCYTESUR Negative   RBCUA 2   WBCUA 1   BACTERIA None   HYALINECASTS 0    and All pertinent labs from the last 24 hours have been reviewed.    Diagnostic Results:  I have reviewed and interpreted all pertinent imaging results/findings within  the past 24 hours.   US neck/left upper ext shows tumor mass abutting L IJ vein.

## 2018-11-20 NOTE — PROGRESS NOTES
[facility]  Hematology  Bone Marrow Transplant  Progress Note    Patient Name: Arjun Mcpherson Jr.  Admission Date: 11/15/2018  Hospital Length of Stay: 5 days  Code Status: Full Code    Subjective:     Interval History: Worsening PHOENIX and transaminitis, creat 3.1, ALT 57,  today. Peripheral blood smear ordered for LDH 1539 per neph recs. PT consulted for limb massage d/t pronounced lymph edema, compression stocking also ordered for arm. Continues on zosyn for tumor wound to face. Dr. Rao in contact with physician at [facility] for chemo dosing for patient, currently scheduled for tomorrow 11/21, transportation concern per family. May possibly do hospital to hospital transfer.    Objective:     Vital Signs (Most Recent):  Temp: 98.5 °F (36.9 °C) (11/20/18 0707)  Pulse: 91 (11/20/18 1209)  Resp: 17 (11/20/18 0707)  BP: 127/71 (11/20/18 0707)  SpO2: 97 % (11/20/18 1012) Vital Signs (24h Range):  Temp:  [98.5 °F (36.9 °C)-99.2 °F (37.3 °C)] 98.5 °F (36.9 °C)  Pulse:  [] 91  Resp:  [17-20] 17  SpO2:  [93 %-99 %] 97 %  BP: (127-162)/(71-88) 127/71     Weight: 97.9 kg (215 lb 13.3 oz)  Body mass index is 27.71 kg/m².  Body surface area is 2.26 meters squared.      Intake/Output - Last 3 Shifts       11/18 0700 - 11/19 0659 11/19 0700 - 11/20 0659 11/20 0700 - 11/21 0659    P.O. 450 720 470    I.V. (mL/kg) 831.7 (8.5)      IV Piggyback 300 1100     Total Intake(mL/kg) 1581.7 (16.2) 1820 (18.6) 470 (4.8)    Urine (mL/kg/hr) 400 (0.2) 1100 (0.5) 1000 (1.9)    Emesis/NG output       Stool  0     Total Output 400 1100 1000    Net +1181.7 +720 -530           Urine Occurrence  4 x     Stool Occurrence  1 x           Physical Exam   Constitutional: He is oriented to person, place, and time. Vital signs are normal. He is cooperative. No distress.   HENT:   Right Ear: External ear normal.   Left Ear: External ear normal.   Large necrotic mass to left neck and face  Yellow crusting to facial  skin breakdown   Eyes: Conjunctivae and EOM are normal. Right eye exhibits no discharge. Left eye exhibits no discharge. No scleral icterus.   Neck: Edema and decreased range of motion present.       Cardiovascular: Regular rhythm, normal heart sounds and intact distal pulses. Exam reveals no gallop and no friction rub.   No murmur heard.  Pulmonary/Chest: Effort normal and breath sounds normal. No respiratory distress. He has no wheezes. He has no rales. He exhibits no tenderness.   Abdominal: Soft. Bowel sounds are normal. He exhibits no distension and no mass. There is no guarding.   Musculoskeletal: He exhibits edema. He exhibits no tenderness.   Left arm edema   Neurological: He is alert and oriented to person, place, and time. No cranial nerve deficit. Coordination normal.   Skin: Skin is warm. Rash noted. He is not diaphoretic. No erythema. No pallor.   Psychiatric: He has a normal mood and affect. Judgment and thought content normal.   Nursing note and vitals reviewed.      Significant Labs:   CBC:   Recent Labs   Lab 11/19/18  0400 11/20/18  0400   WBC 4.02 4.46   HGB 10.8* 10.2*   HCT 33.9* 32.4*   * 144*   , CMP:   Recent Labs   Lab 11/19/18  0400 11/20/18  0400    138   K 3.5 3.4*    103   CO2 23 24    75   BUN 9 13   CREATININE 2.8* 3.1*   CALCIUM 8.7 8.7   PROT 5.6* 5.5*   ALBUMIN 2.8* 2.7*   BILITOT 0.6 0.9   ALKPHOS 136* 206*   AST 71* 111*   ALT 33 57*   ANIONGAP 12 11   EGFRNONAA 23.3* 20.6*   , Coagulation:   Recent Labs   Lab 11/18/18  1507 11/19/18  0400 11/20/18  0400   INR 1.1 1.1 1.1    Uric Acid   Recent Labs   Lab 11/18/18  1507 11/19/18  0400 11/20/18  0400   URICACID 2.9* 3.3* 3.1*   , Urine Studies:   Recent Labs   Lab 11/19/18  0934   COLORU Yellow   APPEARANCEUA Clear   PHUR 5.0   SPECGRAV 1.010   PROTEINUA 1+*   GLUCUA Negative   KETONESU Negative   BILIRUBINUA Negative   OCCULTUA 1+*   NITRITE Negative   LEUKOCYTESUR Negative   RBCUA 2   WBCUA 1   BACTERIA  None   HYALINECASTS 0    and All pertinent labs from the last 24 hours have been reviewed.    Diagnostic Results:  I have reviewed and interpreted all pertinent imaging results/findings within the past 24 hours.   US neck/left upper ext shows tumor mass abutting L IJ vein.     Assessment/Plan:     * Left facial swelling    DDx include L sided facial cellulitis vs allergy to immunotherapy (Keytruda) vs effects of malignancy  -patients VS are stable, no significant WBC.   -patient given Vanc and Zosyn in the ED; will continue and add Bactrim for atypicals. Wound cx pending.   -BCX x2 pending, culture of the fluid drained from site pending as well  -initial lactic acid was elevated, and patient received 1L of NaCl in the ED now with repeat lactic acid pending  -CT scan showed mild airway narrowing and small retro pharyndeal effusion  -ENT consulted, conducted bedside laryngoscope and saw L side vocal cord paralysis  -will have low threshold for calling the ICU for airway patency, if patient develops SOB or stridor or any signs that he cannot manage secretions   -will have percocet and zofran PRN for pain and nausea control- added Compazine IV 2nd line.   -Imaging shows tumor abutting L IJ, with sequela of L arm swelling         Transaminitis    - worsening LFTs, ALT 57, , LDH 1539  - peripheral blood smear ordered 11/20 per neph recs d/t elevated LDH  - continue to monitor     PHOENIX (acute kidney injury)    - Cr increased from 1.2 to 2.0 on 11/18 and worsening to 2.8 on 2/19, continues with worsen with creat 3.1 on 11/20  - PHOENIX continues to worsen, nephrology consulted. FeNA indicating pre-renal PHOENIX, 1L NS bolus given again. US retroperitoneal showing cortical thinning and decreased perfusion bilaterally, consistent with medical renal disease  - unlikely ischemic ATN as no episodes of hypotension; unlikely AIN as no eosinophils seen on Pink's stain however will stop bactrim today as this could be contributing to  PHOENIX. Vancomycin level supra-therapeutic at 32.9 yesterday as well, holding additional  Vancomycin for now and obtaining daily levels.   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 Full code   - Dr. Rao to address goals and care coordination with [facility].      Vocal cord paralysis, unilateral partial    - seen on ENT scope day of admission. Diet dysphagia soft and thin liquids per SLP      Dysphagia    - assessed by ENT; ok for diet per SLP and advancing diet per their recs      Acute bullous dermatitis    - wound care and broad spectrum antibiotics (zosyn)- bullae worsening as of 11/18     Type 2 diabetes mellitus without complication, with long-term current use of insulin    - on metformin at home, SSI while inpatient     T-cell lymphoma    follows with Dr. Rao, last seen 10/15  -Pt was being treated for peripheral T-cell lymphoma by .  At the time of diagnosis, it appeared that all of the disease was confined to his neck and most of it was surgically removed.  A PET scan in June 2018 was normal except for a lymph node just to the  left of midline in the upper neck with an SUV max of 18.2.  That node was palpable, but it disappeared after the first course of therapy.  His pretreatment LDH was normal at 175.   -He has completed five courses of dose adjusted EPOCH.  The doses of cyclophosphamide and etoposide have been increased twice based on neutrophil counts and platelet counts.  Adriamycin not incrased because higher than normal risk of future cardiac disease.  Also, the dosage of vincristine was decreased slightly because of diabetes mellitus and numbness in his feet.  The numbness in the feet has not increased.   - He is planning on going to [facility] for of Keytruda and Romidepsin. Attempting to get in touch with [facility] today for possible hospital to hospital transfer for 2nd dose 11/21         VTE Risk Mitigation (From admission, onward)        Ordered     enoxaparin injection 40 mg  Daily      11/16/18 1315     Place NATO hose  Until discontinued      11/15/18 2214     Place sequential compression device  Until discontinued      11/15/18 2214     Reason for no Mechanical VTE Prophylaxis  Once      11/15/18 2214     IP VTE HIGH RISK PATIENT  Once      11/15/18 2214          Disposition: Inpatient, awaiting further word on treatment at [facility] Leslie NP  Bone Marrow Transplant  [facility]

## 2018-11-20 NOTE — PT/OT/SLP PROGRESS
Speech Language Pathology      Arjun Mcpherson Jr.  MRN: [id number]    Attempted to see Pt this date. Pt pending discharge at time of attempt. Pt to continue diet recs as previously established and follow up with out patient ENT and speech services as needed.     Abril Kelly, JONA-SLP

## 2018-11-20 NOTE — ASSESSMENT & PLAN NOTE
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 Pt is currently enrolled in trial at [facility] with worsening left neck mass expansion and concern for future airway compromise. Pt is alert and oriented to person, place, time. Pt reports no distress, pain at this time.     Spoke to Hem/Onc fellow. Team planning for pt to follow-up with [facility] for further chemo. Per MD,  working on d/c planning.     Today:   Goals of care: Spoke with pt and pt's wife. Per wife, plan is for pt to go back to [facility] for treatment. Wife is waiting to talk to Hem/Onc team concerning logistics of d/c. Pt is full code and desires aggressive treatment therapies.  Support given to pt and wife who are dealing with pt's chronic/life-threatening illness.     Plan/Recs:  Referral to home-based palliative care vs Dr. Kalli Shepherd for outpatient palliative care clinic (only on Fridays) upon discharge. Pt/wife open to continuation of pal care services at d/c.   Will continue to follow for support.         11-16-18 Note per Dr. Artis  Decision-making:   -Pt does have decision-making capacity  -Pt has appointed a HCPOA: his wife Mylene Mcpherson    Advance care planning:  -The patient has previously engaged in advance care planning  -A living will and HCPOA are scanned into Epic.  Pt does not want any LST should he have a terminal, irreversible condition, per living will    Estimated prognosis:   -Some degree of prognostic uncertainty pending pt's response to current chemo regimen    Disease understanding:  -Patient/NOK demonstrate a good understanding of his current medical condition  -pt seems very realistic about his likely poor prognosis    Goals of care:  -Most important goals at this time are curative/life-prolongation  -pt does have a living will and it says that he would not want any LST in the setting a terminal/irreversible condition  -Code status: full code    Active symptoms related to advanced  "illness include:    Neck pain 2/2 large tumor burden  -started on neurontin and nucynta this week by pain management at [facility], pt was only using 2 nucynta 50 mg/day for breakthrough pain  -minimal pain since being admitted, has only received 1 dose 0.5 mg dilaudid  -reports that pain often comes in short, brief episodes and subsides spontaneously  -for now, would leave PRN opiates (morphine fine though can transition back to his nucynta when able to take PO)    Airway obstruction  -pt with significant worries about choking and airway obstruction  -we briefly discussed elective trach - presume that pt will f/u with ENT as outpatient to discuss further - but anticipate that pt will likely need this procedure given rapid POD    Based on the above, my recommendations include  -referral to home-based palliative care vs Dr. Kalli Shepherd for outpatient palliative care clinic (only on Fridays) upon discharge    Discussion with pt/wife  -checked in with pt about how things were going and he said "I'm tired" repeatedly  -he expressed his frustration w his disease progression, his worry that things may continue to worsen and what this means  -in terms of sx, said biggest worry is that he will choke and he increasingly feels like he's going to choke.  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, has had season tickets since 1984 and is excited about continuing to watch the [employer] this season  -he became tearful when talking about his worry for the future, leaving his children and his wife, but after crying he remarked on how good it felt to cry and talk about these issues  -he said he "would have quit a long time ago" but the only reason he's still fighting his cancer is bc of his wife's support  -his wife inquired about " whether they could continue to be followed by palliative care once they leave the hospital and I assured them this was possible  -they would like palliative care to continue to follow them while they remain inpatient, as well

## 2018-11-20 NOTE — ASSESSMENT & PLAN NOTE
- worsening LFTs, ALT 57, , LDH 1539  - peripheral blood smear ordered 11/20 per neph recs d/t elevated LDH  - continue to monitor

## 2018-11-20 NOTE — ASSESSMENT & PLAN NOTE
follows with Dr. Rao, last seen 10/15  -Pt was being treated for peripheral T-cell lymphoma by .  At the time of diagnosis, it appeared that all of the disease was confined to his neck and most of it was surgically removed.  A PET scan in June 2018 was normal except for a lymph node just to the  left of midline in the upper neck with an SUV max of 18.2.  That node was palpable, but it disappeared after the first course of therapy.  His pretreatment LDH was normal at 175.   -He has completed five courses of dose adjusted EPOCH.  The doses of cyclophosphamide and etoposide have been increased twice based on neutrophil counts and platelet counts.  Adriamycin not incrased because higher than normal risk of future cardiac disease.  Also, the dosage of vincristine was decreased slightly because of diabetes mellitus and numbness in his feet.  The numbness in the feet has not increased.   - He is planning on going to [facility] for of Keytruda and Romidepsin. Attempting to get in touch with [facility] today for possible hospital to hospital transfer for 2nd dose 11/21

## 2018-11-20 NOTE — PROGRESS NOTES
[facility]  Palliative Medicine  Progress Note    Patient Name: Arjun Mcpherson Jr.  MRN: [id number]  Admission Date: 11/15/2018  Hospital Length of Stay: 5 days  Code Status: Full Code   Attending Provider: Brodie Rao MD  Consulting Provider: GUS Elena  Primary Care Physician: Jason Santo MD  Principal Problem:Left facial swelling    Patient information was obtained from patient, spouse/SO and ER records.      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 Pt is currently enrolled in trial at [facility] with worsening left neck mass expansion and concern for future airway compromise. Pt is alert and oriented to person, place, time. Pt reports no distress, pain at this time.     Spoke to Hem/Onc fellow. Team planning for pt to follow-up with [facility] for further chemo. Per MD,  working on d/c planning.     Today:   Goals of care: Spoke with pt and pt's wife. Per wife, plan is for pt to go back to [facility] for treatment. Wife is waiting to talk to Hem/Onc team concerning logistics of d/c. Pt is full code and desires aggressive treatment therapies.  Support given to pt and wife who are dealing with pt's chronic/life-threatening illness.     Plan/Recs:  Referral to home-based palliative care vs Dr. Kalli Shepherd for outpatient palliative care clinic (only on Fridays) upon discharge. Pt/wife open to continuation of pal care services at d/c.   Will continue to follow for support.         11-16-18 Note per Dr. Artis  Decision-making:   -Pt does have decision-making capacity  -Pt has appointed a HCPOA: his wife Mylene Mcpherson    Advance care planning:  -The patient has previously engaged in advance care planning  -A living will and HCPOA are scanned into Epic.  Pt does not want any LST should he have a terminal, irreversible condition, per living will    Estimated prognosis:   -Some degree  "of prognostic uncertainty pending pt's response to current chemo regimen    Disease understanding:  -Patient/NOK demonstrate a good understanding of his current medical condition  -pt seems very realistic about his likely poor prognosis    Goals of care:  -Most important goals at this time are curative/life-prolongation  -pt does have a living will and it says that he would not want any LST in the setting a terminal/irreversible condition  -Code status: full code    Active symptoms related to advanced illness include:    Neck pain 2/2 large tumor burden  -started on neurontin and nucynta this week by pain management at [facility], pt was only using 2 nucynta 50 mg/day for breakthrough pain  -minimal pain since being admitted, has only received 1 dose 0.5 mg dilaudid  -reports that pain often comes in short, brief episodes and subsides spontaneously  -for now, would leave PRN opiates (morphine fine though can transition back to his nucynta when able to take PO)    Airway obstruction  -pt with significant worries about choking and airway obstruction  -we briefly discussed elective trach - presume that pt will f/u with ENT as outpatient to discuss further - but anticipate that pt will likely need this procedure given rapid POD    Based on the above, my recommendations include  -referral to home-based palliative care vs Dr. Kalli Shepherd for outpatient palliative care clinic (only on Fridays) upon discharge    Discussion with pt/wife  -checked in with pt about how things were going and he said "I'm tired" repeatedly  -he expressed his frustration w his disease progression, his worry that things may continue to worsen and what this means  -in terms of sx, said biggest worry is that he will choke and he increasingly feels like he's going to choke.  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  -he became tearful when talking about his worry for the future, leaving his children and his wife, but after crying he remarked on how good it felt to cry and talk about these issues  -he said he "would have quit a long time ago" but the only reason he's still fighting his cancer is bc of his wife's support  -his wife inquired about whether they could continue to be followed by palliative care once they leave the hospital and I assured them this was possible  -they would like palliative care to continue to follow them while they remain inpatient, as well         I will follow-up with patient. Please contact us if you have any additional questions.    Subjective:     Chief Complaint:   Chief Complaint   Patient presents with    Facial Swelling     Pt has lymphoma with most recent chemo last Monday. Pt developed left facial swelling over about a week. Pt denies trouble breathing. Pt reports some difficulty swallowing.       HPI:   64 yo m, h/o DM, L neck growth 3/2018, which was initially evaluated by his local PCP and treated with an antibiotic. After no improvement they had done an excisional biopsy of his L neck adenopathy that revealed peripheral T-cell lymphoma. This was followed by surgical removal of his disease as it appeared to be confined to his neck. PET scan in 6/2018 normal save for a LN that had disappeared after first cycle of EPOCH therapy. There was rapid growth of L cervical LAD confirmed by PET after C4, and patient has since completed 5 courses of EPOCH prior to being seen at [facility], first visit there on 10/22.  Admitted here 10/30 for worsening L neck swelling, started on steroids w small improvement.  Followed up at [facility] and received his first cycle of pembrolizumab and romidepsin this past Monday 11/12/2018.    He " was admitted to the BMT team for significant worsening of his L neck swelling.  CT done on 11/15 showed diffuse abnormal soft tissue attenuation throughout the left anterior neck and face regions, significantly increased since prior, containing at least 3 hypoattenuating component suggestive of a necrotic tissue, as detailed above.  Developing abscess/myonecrosis or tumor recurrence remain in the differential diagnosis.  ENT scoped pt in the ED, has left vocal cord paralysis but airway patent, 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.   There recommendation was for speech and swallow eval and consideration of elective trach should sx progress.      Palliative care consulted for symptom management      Hospital Course:  No notes on file      Past Medical History:   Diagnosis Date    Cancer     Hearing loss     Peripheral neuropathy 6/19/2018    Pre-diabetes        Past Surgical History:   Procedure Laterality Date    DISSECTION OF NECK Left 5/30/2018    Procedure: DISSECTION, NECK;  Surgeon: Regan King MD;  Location: [facility] OR [address] [place] [address];  Service: ENT;  Laterality: Left;    DISSECTION, NECK Left 5/30/2018    Performed by Regan King MD at [facility] OR [address] [place] [address]    EXCISION OF PAROTID GLAND Left 5/30/2018    Procedure: EXCISION, PAROTID GLAND;  Surgeon: Regan King MD;  Location: [facility] OR [address] [place] [address];  Service: ENT;  Laterality: Left;    EXCISION, PAROTID GLAND Left 5/30/2018    Performed by Regan King MD at [facility] OR [address] [place] [address]    KNEE SURGERY Right        Review of patient's allergies indicates:  No Known Allergies    Medications:  Continuous Infusions:  Scheduled Meds:   allopurinol  300 mg Oral Daily    enoxaparin  40 mg Subcutaneous Daily    gabapentin  100 mg Oral BID    piperacillin-tazobactam (ZOSYN) IVPB  4.5 g Intravenous Q8H    white petrolatum   Topical (Top) BID     PRN Meds:dextrose 50%, dextrose 50%, glucagon (human recombinant), glucose,  glucose, HYDROmorphone, influenza, ondansetron, sodium chloride 0.9%    Family History     None        Tobacco Use    Smoking status: Former Smoker     Types: Cigars    Smokeless tobacco: Never Used    Tobacco comment: 1 cigar a week   Substance and Sexual Activity    Alcohol use: Yes     Alcohol/week: 0.6 oz     Types: 1 Shots of liquor per week     Comment: 2 per month    Drug use: No    Sexual activity: Not on file       Review of Systems   Constitutional: Positive for fatigue. Negative for chills and fever.   HENT: Positive for facial swelling.    Respiratory: Positive for choking. Negative for shortness of breath.    Cardiovascular: Negative for chest pain.   Gastrointestinal: Negative for constipation and nausea.   Psychiatric/Behavioral: Positive for dysphoric mood. The patient is nervous/anxious.    All other systems reviewed and are negative.    Objective:     Vital Signs (Most Recent):  Temp: 98.5 °F (36.9 °C) (11/20/18 0707)  Pulse: (!) 127 (11/20/18 0744)  Resp: 17 (11/20/18 0707)  BP: 127/71 (11/20/18 0707)  SpO2: 97 % (11/20/18 1012) Vital Signs (24h Range):  Temp:  [97.8 °F (36.6 °C)-99.2 °F (37.3 °C)] 98.5 °F (36.9 °C)  Pulse:  [] 127  Resp:  [17-20] 17  SpO2:  [93 %-99 %] 97 %  BP: (127-162)/(71-88) 127/71     Weight: 97.9 kg (215 lb 13.3 oz)  Body mass index is 27.71 kg/m².    Review of Symptoms  Bowel Management Plan (BMP): No    ECOG Performance Status Grade: 2 - Ambulates, capable of self care only    Physical Exam   Constitutional: He is oriented to person, place, and time. Vital signs are normal. He is cooperative.   HENT:   Large necrotic mass to left neck and face    No stridor/drooling on my assessment   Pulmonary/Chest: No respiratory distress.   Neurological: He is alert and oriented to person, place, and time.   Nursing note and vitals reviewed.      CBC:   Recent Labs   Lab 11/20/18  0400   WBC 4.46   HGB 10.2*   HCT 32.4*   MCV 94   *     BMP:  Recent Labs   Lab  11/20/18  0400   GLU 75      K 3.4*      CO2 24   BUN 13   CREATININE 3.1*   CALCIUM 8.7   MG 1.9     LFT:  Lab Results   Component Value Date     (H) 11/20/2018    ALKPHOS 206 (H) 11/20/2018    BILITOT 0.9 11/20/2018     Albumin:   Albumin   Date Value Ref Range Status   11/20/2018 2.7 (L) 3.5 - 5.2 g/dL Final     Protein:   Total Protein   Date Value Ref Range Status   11/20/2018 5.5 (L) 6.0 - 8.4 g/dL Final     Lactic acid:   Lab Results   Component Value Date    LACTATE 2.3 (H) 11/15/2018    LACTATE 3.7 (HH) 11/15/2018       Significant Imaging: CT: I have reviewed all pertinent results/findings within the past 24 hours:  neck mass    Advanced Directives::  Living Will: Yes. Copy on chart: Yes - patient wishes that all LST be withheld if he has an incurable/irreversible condition    LaPOST: No    Do Not Resuscitate Status: No  Medical Power of : Yes. Agent's Name: Mylene Mcpherson    Decision-Making Capacity: Patient/wife answered questions.    Living Arrangements: Lives with spouse      > 50% of 25 min visit spent in chart review, face to face discussion of goals of care,  symptom assessment, coordination of care and emotional support.    Harini Umana, CNS  Palliative Medicine  [facility]

## 2018-11-20 NOTE — SUBJECTIVE & OBJECTIVE
"Interval History:   UOP improved to 1100 ml plus 4 unmeasured voids. sCr increased to 3.1 from 2.8 mg/dL muy suggest delta Cr improving. He has increase his intake is somewhat improved but he has responded by increasing in UOP .     Review of patient's allergies indicates:  No Known Allergies  Current Facility-Administered Medications   Medication Frequency    allopurinol tablet 300 mg Daily    dextrose 50% injection 12.5 g PRN    dextrose 50% injection 25 g PRN    enoxaparin injection 40 mg Daily    gabapentin capsule 100 mg BID    glucagon (human recombinant) injection 1 mg PRN    glucose chewable tablet 16 g PRN    glucose chewable tablet 24 g PRN    HYDROmorphone injection 1 mg Q4H PRN    influenza (FLUZONE QUADRIVALENT) vaccine 0.5 mL Prior to discharge    ondansetron injection 4 mg Q6H PRN    piperacillin-tazobactam 4.5 g in sodium chloride 0.9% 100 mL IVPB (ready to mix system) Q8H    sodium chloride 0.9% flush 5 mL PRN    white petrolatum 41 % ointment BID     Current Outpatient Medications   Medication    allopurinol (ZYLOPRIM) 300 MG tablet    blood sugar diagnostic Strp    blood-glucose meter kit    gabapentin (NEURONTIN) 100 MG capsule    lancets Misc    metFORMIN (GLUCOPHAGE) 500 MG tablet    ondansetron (ZOFRAN) 8 MG tablet    oxyCODONE-acetaminophen (PERCOCET) 5-325 mg per tablet    pen needle, diabetic (BD ULTRA-FINE CORI PEN NEEDLE) 32 gauge x 5/32" Ndle    senna (SENOKOT) 8.6 mg tablet    tapentadol (NUCYNTA) 50 mg Tab    amoxicillin-clavulanate 500-125mg (AUGMENTIN) 500-125 mg Tab       Objective:     Vital Signs (Most Recent):  Temp: 99.3 °F (37.4 °C) (11/20/18 1523)  Pulse: 104 (11/20/18 1536)  Resp: 18 (11/20/18 1523)  BP: (!) 151/84 (11/20/18 1523)  SpO2: (!) 93 % (11/20/18 1523)  O2 Device (Oxygen Therapy): room air (11/20/18 1231) Vital Signs (24h Range):  Temp:  [98.2 °F (36.8 °C)-99.3 °F (37.4 °C)] 99.3 °F (37.4 °C)  Pulse:  [] 104  Resp:  [17-20] 18  SpO2:  " [93 %-99 %] 93 %  BP: (127-164)/(71-88) 151/84     Weight: 97.9 kg (215 lb 13.3 oz) (11/15/18 2328)  Body mass index is 27.71 kg/m².  Body surface area is 2.26 meters squared.    I/O last 3 completed shifts:  In: 2020 [P.O.:720; IV Piggyback:1300]  Out: 1100 [Urine:1100]    Physical Exam   Constitutional: He is oriented to person, place, and time. He appears well-developed and well-nourished. No distress.   HENT:   Head: Normocephalic and atraumatic.   Lips are swollen (L>>R) Left buccal soft tissue is swollen, firm. No masses palpated. Oral Tongue mobile, normal in size. Hard Palate WNL     Eyes: Conjunctivae are normal. Pupils are equal, round, and reactive to light.   Neck: Trachea normal and normal range of motion. Neck supple. No JVD present.   Neck: L swelling with crusting and bulla present.    Cardiovascular: Normal rate, regular rhythm, S1 normal, S2 normal, intact distal pulses and normal pulses. Exam reveals no gallop and no friction rub.   No murmur heard.  Pulmonary/Chest: Effort normal and breath sounds normal.   Abdominal: Soft. Bowel sounds are normal. He exhibits no distension. There is no tenderness.   Musculoskeletal: Normal range of motion. He exhibits no edema.   Neurological: He is alert and oriented to person, place, and time.   Skin: Skin is warm and dry. Capillary refill takes less than 2 seconds.   Psychiatric: He has a normal mood and affect. His behavior is normal.   Vitals reviewed.      Significant Labs:  BMP:   Recent Labs   Lab 11/20/18  0400   GLU 75      CO2 24   BUN 13   CREATININE 3.1*   CALCIUM 8.7   MG 1.9     Cardiac Markers: No results for input(s): CKMB, TROPONINT, MYOGLOBIN in the last 168 hours.  CBC:   Recent Labs   Lab 11/20/18  0400   WBC 4.46   RBC 3.46*   HGB 10.2*   HCT 32.4*   *   MCV 94   MCH 29.5   MCHC 31.5*     CMP:   Recent Labs   Lab 11/20/18  0400   GLU 75   CALCIUM 8.7   ALBUMIN 2.7*   PROT 5.5*      K 3.4*   CO2 24      BUN 13    CREATININE 3.1*   ALKPHOS 206*   ALT 57*   *   BILITOT 0.9     Coagulation:   Recent Labs   Lab 11/20/18  0400   INR 1.1     All labs within the past 24 hours have been reviewed.     Significant Imaging:  Labs: Reviewed  US: Reviewed

## 2018-11-20 NOTE — ASSESSMENT & PLAN NOTE
- T cell lymphoma with necrotic mass and significant morbidity- Palliative consulted, goals ongoing. Full code   - Dr. Rao to address goals and care coordination with [facility].

## 2018-11-20 NOTE — PROGRESS NOTES
[facility]  Nephrology  Progress Note    Patient Name: Arjun Mcpherson Jr.  MRN: [id number]  Admission Date: 11/15/2018  Hospital Length of Stay: 5 days  Attending Provider: No att. providers found   Primary Care Physician: Jason Santo MD  Principal Problem:Left facial swelling    Subjective:     HPI: Mr Arjun Mcpherson Jr. is a 63 y.o. AA male with a complex PMHx relevant for peripheral T-cell lymphoma (last seen by Dr. Rao, patient of Dr. Franco, previously on EPOCH, currently receiving keytruda and Romidepsin treatment at [facility]), DMt2 on metformin admitted to BMTx service secondary to swelling and blistering of his face. Nephrology consulted for PHOENIX. He has a a sCr of 0.9 mg/dL. Today his sCr was 2.8 mg/dL, sCr has been rising 24 hrs post admission from 0.9 --> 1.2 --> 2.0--> 2.8 mg/dL. He also noted  UOP. He has been treated empirically with Bactrim for his cellulitis in his face. He has poor PO intake and really not eating since his BUN is 9 and he is 98 kgs. He was given IV fluid overnight NS 1 lt and repeat this morning. Bactrim was stooped at request of Nephrology. His UOP has started to improved. UA has 1+ prtein and 1+ occ Blood and UPCr was 0.5 g/g.            Interval History:   UOP improved to 1100 ml plus 4 unmeasured voids. sCr increased to 3.1 from 2.8 mg/dL muy suggest delta Cr improving. He has increase his intake is somewhat improved but he has responded by increasing in UOP .     Review of patient's allergies indicates:  No Known Allergies  Current Facility-Administered Medications   Medication Frequency    allopurinol tablet 300 mg Daily    dextrose 50% injection 12.5 g PRN    dextrose 50% injection 25 g PRN    enoxaparin injection 40 mg Daily    gabapentin capsule 100 mg BID    glucagon (human recombinant) injection 1 mg PRN    glucose chewable tablet 16 g PRN    glucose chewable tablet 24 g PRN    HYDROmorphone injection 1 mg Q4H PRN     "influenza (FLUZONE QUADRIVALENT) vaccine 0.5 mL Prior to discharge    ondansetron injection 4 mg Q6H PRN    piperacillin-tazobactam 4.5 g in sodium chloride 0.9% 100 mL IVPB (ready to mix system) Q8H    sodium chloride 0.9% flush 5 mL PRN    white petrolatum 41 % ointment BID     Current Outpatient Medications   Medication    allopurinol (ZYLOPRIM) 300 MG tablet    blood sugar diagnostic Strp    blood-glucose meter kit    gabapentin (NEURONTIN) 100 MG capsule    lancets Misc    metFORMIN (GLUCOPHAGE) 500 MG tablet    ondansetron (ZOFRAN) 8 MG tablet    oxyCODONE-acetaminophen (PERCOCET) 5-325 mg per tablet    pen needle, diabetic (BD ULTRA-FINE CORI PEN NEEDLE) 32 gauge x 5/32" Ndle    senna (SENOKOT) 8.6 mg tablet    tapentadol (NUCYNTA) 50 mg Tab    amoxicillin-clavulanate 500-125mg (AUGMENTIN) 500-125 mg Tab       Objective:     Vital Signs (Most Recent):  Temp: 99.3 °F (37.4 °C) (11/20/18 1523)  Pulse: 104 (11/20/18 1536)  Resp: 18 (11/20/18 1523)  BP: (!) 151/84 (11/20/18 1523)  SpO2: (!) 93 % (11/20/18 1523)  O2 Device (Oxygen Therapy): room air (11/20/18 1231) Vital Signs (24h Range):  Temp:  [98.2 °F (36.8 °C)-99.3 °F (37.4 °C)] 99.3 °F (37.4 °C)  Pulse:  [] 104  Resp:  [17-20] 18  SpO2:  [93 %-99 %] 93 %  BP: (127-164)/(71-88) 151/84     Weight: 97.9 kg (215 lb 13.3 oz) (11/15/18 2328)  Body mass index is 27.71 kg/m².  Body surface area is 2.26 meters squared.    I/O last 3 completed shifts:  In: 2020 [P.O.:720; IV Piggyback:1300]  Out: 1100 [Urine:1100]    Physical Exam   Constitutional: He is oriented to person, place, and time. He appears well-developed and well-nourished. No distress.   HENT:   Head: Normocephalic and atraumatic.   Lips are swollen (L>>R) Left buccal soft tissue is swollen, firm. No masses palpated. Oral Tongue mobile, normal in size. Hard Palate WNL     Eyes: Conjunctivae are normal. Pupils are equal, round, and reactive to light.   Neck: Trachea normal and normal " range of motion. Neck supple. No JVD present.   Neck: L swelling with crusting and bulla present.    Cardiovascular: Normal rate, regular rhythm, S1 normal, S2 normal, intact distal pulses and normal pulses. Exam reveals no gallop and no friction rub.   No murmur heard.  Pulmonary/Chest: Effort normal and breath sounds normal.   Abdominal: Soft. Bowel sounds are normal. He exhibits no distension. There is no tenderness.   Musculoskeletal: Normal range of motion. He exhibits no edema.   Neurological: He is alert and oriented to person, place, and time.   Skin: Skin is warm and dry. Capillary refill takes less than 2 seconds.   Psychiatric: He has a normal mood and affect. His behavior is normal.   Vitals reviewed.      Significant Labs:  BMP:   Recent Labs   Lab 11/20/18  0400   GLU 75      CO2 24   BUN 13   CREATININE 3.1*   CALCIUM 8.7   MG 1.9     Cardiac Markers: No results for input(s): CKMB, TROPONINT, MYOGLOBIN in the last 168 hours.  CBC:   Recent Labs   Lab 11/20/18 0400   WBC 4.46   RBC 3.46*   HGB 10.2*   HCT 32.4*   *   MCV 94   MCH 29.5   MCHC 31.5*     CMP:   Recent Labs   Lab 11/20/18 0400   GLU 75   CALCIUM 8.7   ALBUMIN 2.7*   PROT 5.5*      K 3.4*   CO2 24      BUN 13   CREATININE 3.1*   ALKPHOS 206*   ALT 57*   *   BILITOT 0.9     Coagulation:   Recent Labs   Lab 11/20/18 0400   INR 1.1     All labs within the past 24 hours have been reviewed.     Significant Imaging:  Labs: Reviewed  US: Reviewed    Assessment/Plan:     PHOENIX (acute kidney injury)    PHOENIX suspect a component of pre-renal from poor po intake in combination Yesenia from Bactrim.     · Increase in UOP may suggest renal rceovery but clearance is lagging behind.   · Agree with IV fluid supplementation since Insensible losses increase in him and poor po intake makes him volume depleted.   · UPCr 0.5 g/g   · Renal/retroperitoneal USG: no evidence of Hydronephrosis good size Kidneys 13.0 Left and 12.9 right  there is evidence of hyperechogenicity suggesting some kidney disease but his renal function baseline is very good  · Urine sediment: shows bland urine no cast seen few RBC but this is a kaminski sample.   · Peripheral smear with no scistocytes but LDH is > 1500 Haptoglobin pending  · Please get RFP daily and encourage PO intake  · Maintain MAP > 65, Hemodynamically stable  · Hg >7 if Hg < 7 please transfuse.   · continue to monitor strict I/O's, daily weights, renally dose medications, and avoid nephrotoxic agents                 Thank you for your consult. I will follow-up with patient. Please contact us if you have any additional questions.    Terrence Connolly MD  Nephrology  [facility]

## 2018-11-21 NOTE — PLAN OF CARE
On 11/21/18 at 0640: CM noted that patient discharged on 11/20/18. Patient discharged with his wife with plans to drive to [facility] for further treatment; see MD notes for details. Discharge instructions completed by the bedside nurse.     11/21/18 0639   Final Note   Assessment Type Final Discharge Note   Anticipated Discharge Disposition Home   What phone number can be called within the next 1-3 days to see how you are doing after discharge? (Mylene Mcpherson (Spouse) [phone], [phone] )   Hospital Follow Up  Appt(s) scheduled? Yes   Discharge plans and expectations educations in teach back method with documentation complete? Yes

## 2018-11-23 ENCOUNTER — TELEPHONE (OUTPATIENT)
Dept: INFUSION THERAPY | Facility: HOSPITAL | Age: 63
End: 2018-11-23

## 2018-11-23 NOTE — TELEPHONE ENCOUNTER
Call to pt mobile regarding infusion appt for IVF No answer clinic nurse MELITON Topete RN notified.

## 2018-12-17 ENCOUNTER — PATIENT MESSAGE (OUTPATIENT)
Dept: HEMATOLOGY/ONCOLOGY | Facility: CLINIC | Age: 63
End: 2018-12-17

## 2018-12-26 ENCOUNTER — TELEPHONE (OUTPATIENT)
Dept: HEMATOLOGY/ONCOLOGY | Facility: CLINIC | Age: 63
End: 2018-12-26

## 2018-12-26 NOTE — TELEPHONE ENCOUNTER
----- Message from Karri Tavarez sent at 12/26/2018  1:11 PM CST -----  Contact: Lucina from ([facility])  Would like a call back concerning the patient's pick line IV trying to find out if its still inserted.     Contact:: [phone]       Spoke with Lucina from [facility]. I told her that patient is having a bone marrow transplant in [place]. She will D/C patient services for now.

## 2020-03-03 NOTE — PT/OT/SLP EVAL
"Speech Language Pathology Evaluation  Bedside Swallow    Patient Name:  Arjun Mcpherson Jr.   MRN:  [id number]  Admitting Diagnosis: Left facial swelling    Recommendations:                 General Recommendations:  Dysphagia therapy  Diet recommendations:  Puree, Nectar Thick   Aspiration Precautions: 1 bite/sip at a time, Alternating bites/sips, Assistance with thickening liquids, Feed only when awake/alert, Meds crushed in puree, Small bites/sips and Standard aspiration precautions   General Precautions: Standard,    Communication strategies:  none    History:     Past Medical History:   Diagnosis Date    Cancer     Hearing loss     Peripheral neuropathy 6/19/2018    Pre-diabetes        Past Surgical History:   Procedure Laterality Date    DISSECTION OF NECK Left 5/30/2018    Procedure: DISSECTION, NECK;  Surgeon: Regan King MD;  Location: [facility] OR [address] [place] [address];  Service: ENT;  Laterality: Left;    DISSECTION, NECK Left 5/30/2018    Performed by Regan King MD at [facility] OR [address] [place] [address]    EXCISION OF PAROTID GLAND Left 5/30/2018    Procedure: EXCISION, PAROTID GLAND;  Surgeon: Regan King MD;  Location: [facility] OR [address] [place] [address];  Service: ENT;  Laterality: Left;    EXCISION, PAROTID GLAND Left 5/30/2018    Performed by Regan King MD at [facility] OR [address] [place] [address]    KNEE SURGERY Right        Social History: Patient lives with Spouse     Modified Barium Swallow: N/A   Per ENT assessment 11/15 "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, 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"    Prior diet:regular solids and thin liquids       Subjective     " I feel like I can swallow fine"     Pain/Comfort:  · Pain Rating 1: 0/10  · Pain Rating Post-Intervention 1: 0/10    Objective:     Oral Musculature Evaluation  · Oral Musculature: gross asymmetry present(signifciant left sided chelsea orofacial edema and blistering evident)  · Dentition: present and adequate  · Secretion Management: " adequate  · Oral Labial Strength and Mobility: impaired retraction, impaired pursing  · Lingual Strength and Mobility: WFL  · Volitional Cough: strong and productive   · Volitional Swallow: prompt; difficult to truly palpate and assess hyolaryneal range of motion given underlying orofacial and neck edema   · Voice Prior to PO Intake: dysphonia; breathiness evident with documented vocal fold paresis; otherwise dry and clear    Bedside Swallow Eval:   Consistencies Assessed:  · Thin liquids 6oz via cyclic straw sips   · Nectar thick liquids 9oz via single and cyclic straw sips   · Puree 4oz via full tsp   · Soft solids consumed 1/2 sandwhich      Oral Phase:   · WFL Pt reports continuously chewing on inside of left cheek and it being tender given orofacial edema. SLP discussed with Pt and spouse and length pureeing all solids at this time to alleviate need to chew and allow inner cheek to heal. Pt reports he is pleased with and prefers this plan     Pharyngeal Phase:   · no overt clinical signs/symptoms of aspiration  · no overt clinical signs/symptoms of pharyngeal dysphagia   Given severity of orofacial edema impacting oral control and documented vocal fold paresis discussed with pt, spouse and team cautiously advancing diet at this time to nectar thickened liquids and purees. All parties demonstrated understanding and agreement with plan. SLP educated both pt and spouse how to appropriately thicken liquids     Treatment: Team placed order for both clinical swallow assessment and modified barium swallow assessment. Team in agreement with moving forwards with clinical assessment at the bedside and canceling modified barium swallow assessment at this time. MBSS may be warranted in the future but Pt appearing to tolerate current recommended diet without evidence of clinical signs of aspiration        Education provided to Pt and spouse re: SLP role in acute care setting, overall impressions and therapeutic goals.  Whiteboard updated.      Assessment:     Arjun Mcpherson Jr. is a 63 y.o. male with an SLP diagnosis of Dysphagia and Dysphonia.     Goals:   Multidisciplinary Problems     SLP Goals        Problem: SLP Goal    Goal Priority Disciplines Outcome   SLP Goal     SLP    Description:  Speech Language Pathology Goals  Goals expected to be met by 11/30    1. Pt will tolerate diet of nectar thickened liquids and purees solids   2. Pt will participate in additional PO trials during future speech therapy sessions to help determine least restrictive diet                       Plan:     · Patient to be seen:  4 x/week   · Plan of Care expires:  12/15/18  · Plan of Care reviewed with:      · SLP Follow-Up:  Yes       Discharge recommendations:  outpatient speech therapy   Barriers to Discharge:  None    Time Tracking:     SLP Treatment Date:   11/16/18  Speech Start Time:  1414  Speech Stop Time:  1442     Speech Total Time (min):  28 min    Billable Minutes: Treatment Swallowing Dysfunction 18 and Seld Care/Home Management Training 10    Abril Kelly CCC-SLP  11/16/2018            Length To Time In Minutes Device Was In Place: 10

## 2023-03-17 NOTE — PLAN OF CARE
17-Mar-2023 15:28 Problem: Patient Care Overview  Goal: Plan of Care Review  Outcome: Ongoing (interventions implemented as appropriate)  Patient remains free from falls and injury this shift. Bed in low, locked position with call light in reach. Family at bedside. Pt denies pain, n/v/d, VS stable. Pt voiding per urinal, 1 BM today. 1L NS bolus given. Retroperitoneal US completed today. Increased swelling to right arm noted; MD aware. Telemetry monitoring maintained. Patient encouraged to call for assistance when needed.  Patient verbalized understanding. All belongings within reach.  Will continue to monitor.

## 2025-01-19 NOTE — NURSING
Catheter inserted. Order to d/c home today. Saline lock removed. VSS. Discharge instructions given to pt. Pt verbalized understanding. Pt discharged from OBS unit ambulating. All personal belongings taken home by pt.

## (undated) DEVICE — STAPLER SKIN PROXIMATE WIDE

## (undated) DEVICE — DRAIN CHANNEL ROUND 10FR

## (undated) DEVICE — DRAPE STERI INSTRUMENT 1018

## (undated) DEVICE — FIBRILLAR ABS HEMOSTAT 4X4

## (undated) DEVICE — TRAY MINOR GEN SURG

## (undated) DEVICE — DRAPE INCISE IOBAN 2 23X17IN

## (undated) DEVICE — SKINMARKER & RULER REGULAR X-F

## (undated) DEVICE — TRAY FOLEY 16FR INFECTION CONT

## (undated) DEVICE — SUT SILK 2-0 PS 18IN BLACK

## (undated) DEVICE — CORD BIPOLAR 12 FOOT

## (undated) DEVICE — ELECTRODE NDL

## (undated) DEVICE — NDL HYPO REG 25G X 1 1/2

## (undated) DEVICE — GOWN SURGICAL X-LARGE

## (undated) DEVICE — GAUZE SPONGE PEANUT STRL

## (undated) DEVICE — CONTAINER SPECIMEN STRL 4OZ

## (undated) DEVICE — ELECTRODE BLADE INSULATED 1 IN

## (undated) DEVICE — SEE MEDLINE ITEM 157128

## (undated) DEVICE — SUT 3-0 12-18IN SILK

## (undated) DEVICE — EVACUATOR WOUND BULB 100CC

## (undated) DEVICE — PROBE SIMULATOR KRAFF

## (undated) DEVICE — SUT LIGACLIP SMALL XTRA

## (undated) DEVICE — SUT VICRYL PLUS 4-0 P3 18IN

## (undated) DEVICE — PUNCH BIOPSY 4MM STERILE DISPO

## (undated) DEVICE — ELECTRODE REM PLYHSV RETURN 9

## (undated) DEVICE — COVER LIGHT HANDLE 80/CA

## (undated) DEVICE — CLIP MED TICALL

## (undated) DEVICE — PROBE CATH TEMP 16 FRFOLEY 400

## (undated) DEVICE — SEE MEDLINE ITEM 154981

## (undated) DEVICE — SHEET EENT SPLIT

## (undated) DEVICE — WARMER DRAPE STERILE LF

## (undated) DEVICE — BLADE SURG #15 CARBON STEEL

## (undated) DEVICE — SPONGE LAP 18X18 PREWASHED

## (undated) DEVICE — HOOK LONE STAR BLUNT 12MM

## (undated) DEVICE — SUT VICRYL PLUS 3-0 SH 18IN

## (undated) DEVICE — SUT 2-0 12-18IN SILK

## (undated) DEVICE — SUT COATED VICRYL 4/0 27IN

## (undated) DEVICE — ADHESIVE DERMABOND ADVANCED

## (undated) DEVICE — SEE MEDLINE ITEM 152622

## (undated) DEVICE — SEE MEDLINE ITEM 157194

## (undated) DEVICE — BLADE SURGICAL GLASSVAN #12